# Patient Record
Sex: MALE | Race: WHITE | Employment: OTHER | URBAN - METROPOLITAN AREA
[De-identification: names, ages, dates, MRNs, and addresses within clinical notes are randomized per-mention and may not be internally consistent; named-entity substitution may affect disease eponyms.]

---

## 2017-09-14 ENCOUNTER — OFFICE VISIT (OUTPATIENT)
Dept: ORTHOPEDIC SURGERY | Age: 67
End: 2017-09-14

## 2017-09-14 ENCOUNTER — HOSPITAL ENCOUNTER (OUTPATIENT)
Dept: MRI IMAGING | Age: 67
Discharge: HOME | End: 2017-09-14
Attending: PHYSICIAN ASSISTANT | Admitting: PHYSICIAN ASSISTANT

## 2017-09-14 VITALS
DIASTOLIC BLOOD PRESSURE: 86 MMHG | HEART RATE: 66 BPM | RESPIRATION RATE: 16 BRPM | WEIGHT: 270 LBS | TEMPERATURE: 97.8 F | HEIGHT: 74 IN | SYSTOLIC BLOOD PRESSURE: 138 MMHG | BODY MASS INDEX: 34.65 KG/M2

## 2017-09-14 DIAGNOSIS — M54.17 LUMBOSACRAL RADICULOPATHY AT L5: ICD-10-CM

## 2017-09-14 DIAGNOSIS — M25.552 PAIN OF LEFT HIP JOINT: Primary | ICD-10-CM

## 2017-09-14 DIAGNOSIS — M16.12 ARTHRITIS OF LEFT HIP: ICD-10-CM

## 2017-09-14 DIAGNOSIS — M54.17 RADICULOPATHY OF LUMBOSACRAL REGION: ICD-10-CM

## 2017-09-14 PROCEDURE — 99203 OFFICE O/P NEW LOW 30 MIN: CPT | Performed by: PHYSICIAN ASSISTANT

## 2017-09-14 RX ORDER — DEXAMETHASONE 6 MG/1
6 TABLET ORAL 2 TIMES DAILY WITH MEALS
Qty: 20 TABLET | Refills: 0 | Status: SHIPPED | OUTPATIENT
Start: 2017-09-14 | End: 2017-09-24

## 2017-09-15 ENCOUNTER — OFFICE VISIT (OUTPATIENT)
Dept: ORTHOPEDIC SURGERY | Age: 67
End: 2017-09-15

## 2017-09-15 VITALS
HEIGHT: 74 IN | WEIGHT: 279 LBS | BODY MASS INDEX: 35.81 KG/M2 | HEART RATE: 66 BPM | TEMPERATURE: 97.4 F | DIASTOLIC BLOOD PRESSURE: 83 MMHG | SYSTOLIC BLOOD PRESSURE: 136 MMHG

## 2017-09-15 DIAGNOSIS — M21.372 FOOT DROP, LEFT: ICD-10-CM

## 2017-09-15 DIAGNOSIS — Z98.1 STATUS POST LUMBAR SPINAL FUSION: ICD-10-CM

## 2017-09-15 DIAGNOSIS — M96.1 POSTLAMINECTOMY SYNDROME: ICD-10-CM

## 2017-09-15 DIAGNOSIS — M48.061 SPINAL STENOSIS OF LUMBAR REGION: Primary | ICD-10-CM

## 2017-09-15 PROCEDURE — 99214 OFFICE O/P EST MOD 30 MIN: CPT | Performed by: ORTHOPAEDIC SURGERY

## 2017-09-22 ENCOUNTER — HOSPITAL ENCOUNTER (OUTPATIENT)
Dept: GENERAL RADIOLOGY | Age: 67
Discharge: HOME | End: 2017-09-22
Admitting: ORTHOPAEDIC SURGERY

## 2017-09-22 VITALS
TEMPERATURE: 97.7 F | RESPIRATION RATE: 16 BRPM | HEART RATE: 56 BPM | OXYGEN SATURATION: 94 % | SYSTOLIC BLOOD PRESSURE: 138 MMHG | DIASTOLIC BLOOD PRESSURE: 83 MMHG

## 2017-09-22 DIAGNOSIS — M96.1 POSTLAMINECTOMY SYNDROME: ICD-10-CM

## 2017-09-22 DIAGNOSIS — M48.061 SPINAL STENOSIS OF LUMBAR REGION: ICD-10-CM

## 2017-09-22 DIAGNOSIS — Z98.1 STATUS POST LUMBAR SPINAL FUSION: ICD-10-CM

## 2017-09-22 LAB
INR BLD: 0.91 (ref 0.85–1.15)
PLATELET # BLD: 240 K/UL (ref 135–450)
PROTHROMBIN TIME: 10.3 SEC (ref 9.6–13)

## 2017-09-22 ASSESSMENT — PAIN - FUNCTIONAL ASSESSMENT: PAIN_FUNCTIONAL_ASSESSMENT: 0-10

## 2017-09-25 ENCOUNTER — OFFICE VISIT (OUTPATIENT)
Dept: ORTHOPEDIC SURGERY | Age: 67
End: 2017-09-25

## 2017-09-25 VITALS
SYSTOLIC BLOOD PRESSURE: 144 MMHG | TEMPERATURE: 98 F | HEART RATE: 77 BPM | WEIGHT: 279 LBS | DIASTOLIC BLOOD PRESSURE: 93 MMHG | HEIGHT: 74 IN | BODY MASS INDEX: 35.81 KG/M2

## 2017-09-25 DIAGNOSIS — M21.372 FOOT DROP, LEFT: ICD-10-CM

## 2017-09-25 DIAGNOSIS — M48.061 SPINAL STENOSIS OF LUMBAR REGION: Primary | ICD-10-CM

## 2017-09-25 DIAGNOSIS — M96.1 POSTLAMINECTOMY SYNDROME: ICD-10-CM

## 2017-09-25 PROCEDURE — 99213 OFFICE O/P EST LOW 20 MIN: CPT | Performed by: ORTHOPAEDIC SURGERY

## 2017-09-25 RX ORDER — HYDROCODONE BITARTRATE AND ACETAMINOPHEN 5; 325 MG/1; MG/1
TABLET ORAL
Qty: 40 TABLET | Refills: 0 | Status: ON HOLD | OUTPATIENT
Start: 2017-09-25 | End: 2017-10-14 | Stop reason: HOSPADM

## 2017-09-26 ENCOUNTER — TELEPHONE (OUTPATIENT)
Dept: ORTHOPEDIC SURGERY | Age: 67
End: 2017-09-26

## 2017-10-05 ENCOUNTER — HOSPITAL ENCOUNTER (OUTPATIENT)
Dept: GENERAL RADIOLOGY | Age: 67
Discharge: HOME | End: 2017-10-31

## 2017-10-05 ENCOUNTER — HOSPITAL ENCOUNTER (OUTPATIENT)
Dept: PREADMISSION TESTING | Age: 67
Discharge: HOME | End: 2017-10-05
Attending: ORTHOPAEDIC SURGERY | Admitting: ORTHOPAEDIC SURGERY

## 2017-10-05 DIAGNOSIS — Z01.818 ENCOUNTER FOR PREADMISSION TESTING: ICD-10-CM

## 2017-10-05 LAB
A/G RATIO: 1.3 (ref 1.1–2.2)
ABO/RH: NORMAL
ALBUMIN SERPL-MCNC: 4 G/DL (ref 3.4–5)
ALP BLD-CCNC: 52 U/L (ref 40–129)
ALT SERPL-CCNC: 19 U/L (ref 10–40)
ANION GAP SERPL CALCULATED.3IONS-SCNC: 16 MMOL/L (ref 3–16)
ANTIBODY SCREEN: NORMAL
AST SERPL-CCNC: 13 U/L (ref 15–37)
BASOPHILS ABSOLUTE: 0 K/UL (ref 0–0.2)
BASOPHILS RELATIVE PERCENT: 0.4 %
BILIRUB SERPL-MCNC: 0.6 MG/DL (ref 0–1)
BILIRUBIN URINE: NEGATIVE
BLOOD, URINE: NEGATIVE
BUN BLDV-MCNC: 16 MG/DL (ref 7–20)
CALCIUM SERPL-MCNC: 9.9 MG/DL (ref 8.3–10.6)
CHLORIDE BLD-SCNC: 98 MMOL/L (ref 99–110)
CLARITY: CLEAR
CO2: 23 MMOL/L (ref 21–32)
COLOR: YELLOW
CREAT SERPL-MCNC: 0.8 MG/DL (ref 0.8–1.3)
EKG ATRIAL RATE: 82 BPM
EKG DIAGNOSIS: NORMAL
EKG P AXIS: -26 DEGREES
EKG P-R INTERVAL: 176 MS
EKG Q-T INTERVAL: 354 MS
EKG QRS DURATION: 96 MS
EKG QTC CALCULATION (BAZETT): 413 MS
EKG R AXIS: -47 DEGREES
EKG T AXIS: 11 DEGREES
EKG VENTRICULAR RATE: 82 BPM
EOSINOPHILS ABSOLUTE: 0.6 K/UL (ref 0–0.6)
EOSINOPHILS RELATIVE PERCENT: 6.2 %
GFR AFRICAN AMERICAN: >60
GFR NON-AFRICAN AMERICAN: >60
GLOBULIN: 3.2 G/DL
GLUCOSE BLD-MCNC: 160 MG/DL (ref 70–99)
GLUCOSE URINE: NEGATIVE MG/DL
HCT VFR BLD CALC: 45.8 % (ref 40.5–52.5)
HEMOGLOBIN: 15.8 G/DL (ref 13.5–17.5)
KETONES, URINE: NEGATIVE MG/DL
LEUKOCYTE ESTERASE, URINE: NEGATIVE
LYMPHOCYTES ABSOLUTE: 2.3 K/UL (ref 1–5.1)
LYMPHOCYTES RELATIVE PERCENT: 23.1 %
MCH RBC QN AUTO: 32.3 PG (ref 26–34)
MCHC RBC AUTO-ENTMCNC: 34.5 G/DL (ref 31–36)
MCV RBC AUTO: 93.7 FL (ref 80–100)
MICROSCOPIC EXAMINATION: NORMAL
MONOCYTES ABSOLUTE: 0.8 K/UL (ref 0–1.3)
MONOCYTES RELATIVE PERCENT: 8.4 %
NEUTROPHILS ABSOLUTE: 6.1 K/UL (ref 1.7–7.7)
NEUTROPHILS RELATIVE PERCENT: 61.9 %
NITRITE, URINE: NEGATIVE
PDW BLD-RTO: 13.8 % (ref 12.4–15.4)
PH UA: 5
PLATELET # BLD: 207 K/UL (ref 135–450)
PMV BLD AUTO: 7.9 FL (ref 5–10.5)
POTASSIUM SERPL-SCNC: 4.2 MMOL/L (ref 3.5–5.1)
PROTEIN UA: NEGATIVE MG/DL
RBC # BLD: 4.89 M/UL (ref 4.2–5.9)
SODIUM BLD-SCNC: 137 MMOL/L (ref 136–145)
SPECIFIC GRAVITY UA: 1.02
TOTAL PROTEIN: 7.2 G/DL (ref 6.4–8.2)
URINE REFLEX TO CULTURE: NORMAL
URINE TYPE: NORMAL
UROBILINOGEN, URINE: 0.2 E.U./DL
WBC # BLD: 9.9 K/UL (ref 4–11)

## 2017-10-10 ENCOUNTER — PAT TELEPHONE (OUTPATIENT)
Dept: PREADMISSION TESTING | Age: 67
End: 2017-10-10

## 2017-10-10 VITALS — WEIGHT: 279 LBS | BODY MASS INDEX: 35.81 KG/M2 | HEIGHT: 74 IN

## 2017-10-10 ASSESSMENT — PAIN DESCRIPTION - PAIN TYPE: TYPE: CHRONIC PAIN

## 2017-10-10 ASSESSMENT — PAIN DESCRIPTION - DESCRIPTORS: DESCRIPTORS: ACHING

## 2017-10-10 ASSESSMENT — PAIN - FUNCTIONAL ASSESSMENT: PAIN_FUNCTIONAL_ASSESSMENT: 0-10

## 2017-10-10 ASSESSMENT — PAIN DESCRIPTION - LOCATION: LOCATION: BACK

## 2017-10-10 ASSESSMENT — PAIN DESCRIPTION - FREQUENCY: FREQUENCY: INTERMITTENT

## 2017-10-10 ASSESSMENT — PAIN DESCRIPTION - ORIENTATION: ORIENTATION: LOWER

## 2017-10-10 ASSESSMENT — PAIN SCALES - GENERAL: PAINLEVEL_OUTOF10: 2

## 2017-10-11 DIAGNOSIS — M21.372 FOOT DROP, LEFT: ICD-10-CM

## 2017-10-11 DIAGNOSIS — Z98.1 STATUS POST LUMBAR SPINAL FUSION: Primary | ICD-10-CM

## 2017-10-11 RX ORDER — OXYCODONE AND ACETAMINOPHEN 10; 325 MG/1; MG/1
TABLET ORAL
Qty: 40 TABLET | Refills: 0 | Status: SHIPPED | OUTPATIENT
Start: 2017-10-11 | End: 2017-11-10

## 2017-10-11 RX ORDER — CIPROFLOXACIN 500 MG/1
500 TABLET, FILM COATED ORAL 2 TIMES DAILY
Qty: 20 TABLET | Refills: 0 | Status: SHIPPED | OUTPATIENT
Start: 2017-10-11 | End: 2017-10-21

## 2017-10-11 RX ORDER — CARISOPRODOL 350 MG/1
350 TABLET ORAL EVERY 6 HOURS PRN
Qty: 40 TABLET | Refills: 0 | Status: SHIPPED | OUTPATIENT
Start: 2017-10-11 | End: 2017-10-25

## 2017-10-13 ENCOUNTER — TELEPHONE (OUTPATIENT)
Dept: ORTHOPEDIC SURGERY | Age: 67
End: 2017-10-13

## 2017-10-13 NOTE — TELEPHONE ENCOUNTER
[facility] rx [phone] she is calling to get clarification on medication pls call to advise

## 2017-10-17 ENCOUNTER — TELEPHONE (OUTPATIENT)
Dept: ORTHOPEDIC SURGERY | Age: 67
End: 2017-10-17

## 2017-10-19 ENCOUNTER — TELEPHONE (OUTPATIENT)
Dept: ORTHOPEDIC SURGERY | Age: 67
End: 2017-10-19

## 2017-10-25 ENCOUNTER — OFFICE VISIT (OUTPATIENT)
Dept: ORTHOPEDIC SURGERY | Age: 67
End: 2017-10-25

## 2017-10-25 VITALS
DIASTOLIC BLOOD PRESSURE: 83 MMHG | HEART RATE: 98 BPM | WEIGHT: 279 LBS | HEIGHT: 74 IN | SYSTOLIC BLOOD PRESSURE: 124 MMHG | TEMPERATURE: 96.8 F | BODY MASS INDEX: 35.81 KG/M2

## 2017-10-25 DIAGNOSIS — Z98.1 STATUS POST LUMBAR SPINAL FUSION: Primary | ICD-10-CM

## 2017-10-25 PROCEDURE — 99024 POSTOP FOLLOW-UP VISIT: CPT | Performed by: ORTHOPAEDIC SURGERY

## 2017-10-25 NOTE — PROGRESS NOTES
kg)   BMI 35.82 kg/m²     Neurovascular status is intact to the upper and lower extremities. Incisions (if post-op): Well-healed    IMAGING: AP lateral lumbar spine films taken in the office today demonstrate excellent position of the construct    Radiographs: Results interpreted and reviewed with patient.       Nelda Veras MD FACS  Spinal Surgery  [facility]  10/25/2017

## 2017-12-22 ENCOUNTER — TELEPHONE (OUTPATIENT)
Dept: ORTHOPEDIC SURGERY | Age: 67
End: 2017-12-22
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
## 2018-01-24 ENCOUNTER — OFFICE VISIT (OUTPATIENT)
Dept: ORTHOPEDIC SURGERY | Age: 68
End: 2018-01-24

## 2018-01-24 VITALS
TEMPERATURE: 97.3 F | WEIGHT: 279.8 LBS | HEART RATE: 73 BPM | HEIGHT: 74 IN | DIASTOLIC BLOOD PRESSURE: 86 MMHG | BODY MASS INDEX: 35.91 KG/M2 | SYSTOLIC BLOOD PRESSURE: 126 MMHG

## 2018-01-24 DIAGNOSIS — Z98.1 STATUS POST LUMBAR SPINAL FUSION: Primary | ICD-10-CM

## 2018-01-24 PROCEDURE — 1036F TOBACCO NON-USER: CPT | Performed by: ORTHOPAEDIC SURGERY

## 2018-01-24 PROCEDURE — G8427 DOCREV CUR MEDS BY ELIG CLIN: HCPCS | Performed by: ORTHOPAEDIC SURGERY

## 2018-01-24 PROCEDURE — 4040F PNEUMOC VAC/ADMIN/RCVD: CPT | Performed by: ORTHOPAEDIC SURGERY

## 2018-01-24 PROCEDURE — G8417 CALC BMI ABV UP PARAM F/U: HCPCS | Performed by: ORTHOPAEDIC SURGERY

## 2018-01-24 PROCEDURE — 1123F ACP DISCUSS/DSCN MKR DOCD: CPT | Performed by: ORTHOPAEDIC SURGERY

## 2018-01-24 PROCEDURE — 99213 OFFICE O/P EST LOW 20 MIN: CPT | Performed by: ORTHOPAEDIC SURGERY

## 2018-01-24 PROCEDURE — 3017F COLORECTAL CA SCREEN DOC REV: CPT | Performed by: ORTHOPAEDIC SURGERY

## 2018-01-24 PROCEDURE — G8484 FLU IMMUNIZE NO ADMIN: HCPCS | Performed by: ORTHOPAEDIC SURGERY

## 2018-01-24 RX ORDER — CYCLOBENZAPRINE HCL 10 MG
10 TABLET ORAL 3 TIMES DAILY PRN
Qty: 90 TABLET | Refills: 0 | Status: SHIPPED | OUTPATIENT
Start: 2018-01-24 | End: 2018-02-23

## 2018-01-24 NOTE — PROGRESS NOTES
NAME: Carin Prieto  YOB: 1950  CSN: [id number]  DATE OF SERVICE: 1/24/2018    ASSESSMENT: Status post lumbar fusion. I feel he is making a good recovery. I feel he is suffering from lumbar deconditioning. PLAN: With restrictions and send to therapy for 10 visits for Gina exercise. I will give him Flexeril at nighttime. I will recheck him in 3 months with AP lateral lumbar spine films. DME: No orders of the defined types were placed in this encounter. The patient presents in follow-up from last encounter regarding his recent lumbar fusion 3 months ago. His only complaint is that night time he gets stiff and sore. He denies any radicular complaints. He still feels as if he has some weakness in his left ankle. He is taking no medication. The patient intake form was reviewed first for chief complaint of recent lumbar surgery, past medical history, and review of systems on 1/24/2018. ROS: Pertinent items are noted in HPI  Review of systems reviewed from Patient History Form dated on 9/14/17 and available in the patient's chart under the Media tab. Past Medical History:   Reviewed by Dr. Aramis Patino    Allergies   Allergen Reactions    Sulfa Antibiotics Rash       Past Medical History:   Diagnosis Date    HBP (high blood pressure)     Hyperlipidemia 11/10/2011    Insulin resistance     Lumbosacral radiculopathy at L5 9/14/2017    MDRO (multiple drug resistant organisms) resistance     tx successfully 2 yr ago    QI on CPAP        Current Outpatient Prescriptions   Medication Sig Dispense Refill    metFORMIN (GLUCOPHAGE) 500 MG tablet Take 1 tablet by mouth 2 times daily (with meals) 60 tablet 1    CRESTOR 40 MG tablet TAKE ONE TABLET BY MOUTH EVERY EVENING 90 tablet 2    fosinopril (MONOPRIL) 10 MG tablet TAKE ONE TABLET BY MOUTH DAILY 90 tablet 1    Thiamine HCl (VITAMIN B-1 PO) Take  by mouth daily. No current facility-administered medications for this visit.

## 2018-02-05 ENCOUNTER — HOSPITAL ENCOUNTER (OUTPATIENT)
Dept: OTHER | Age: 68
Discharge: HOME | End: 2018-02-28
Attending: ORTHOPAEDIC SURGERY | Admitting: ORTHOPAEDIC SURGERY

## 2018-02-05 ASSESSMENT — PAIN SCALES - GENERAL: PAINLEVEL_OUTOF10: 6

## 2018-02-05 ASSESSMENT — PAIN DESCRIPTION - LOCATION: LOCATION: BACK

## 2018-02-05 ASSESSMENT — PAIN DESCRIPTION - DESCRIPTORS: DESCRIPTORS: ACHING;DISCOMFORT

## 2018-02-05 ASSESSMENT — PAIN DESCRIPTION - FREQUENCY: FREQUENCY: INTERMITTENT

## 2018-02-05 ASSESSMENT — PAIN DESCRIPTION - ONSET: ONSET: AWAKENED FROM SLEEP

## 2018-02-05 ASSESSMENT — PAIN DESCRIPTION - PROGRESSION: CLINICAL_PROGRESSION: GRADUALLY IMPROVING

## 2018-02-05 ASSESSMENT — PAIN DESCRIPTION - ORIENTATION: ORIENTATION: MID

## 2018-02-05 ASSESSMENT — PAIN DESCRIPTION - PAIN TYPE: TYPE: SURGICAL PAIN

## 2018-02-05 NOTE — PROGRESS NOTES
Dorsiflexion 0-20: 0°  Spine  Lumbar: flexioncoming from thoracic spine, ext only to neutral, B SB dec by 60%, B rot dec  by 25%  Joint Mobility  Spine: fused in lumbar spine    Strength LLE  Strength LLE: Exception  L Ankle Dorsiflexion: 3+/5  Strength Other  Other: core 3/5     Additional Measures  Flexibility: severely tight hip flexors left greater than right  Assessment   Conditions Requiring Skilled Therapeutic Intervention  Body structures, Functions, Activity limitations: Decreased functional mobility ; Decreased ADL status; Decreased ROM; Decreased strength  Assessment: PLOF: was getting L drop foot prior to surgery  Patient has symptoms consistent with decreased flexibility and loss of strength from surgery and possible nerve compression  Treatment Diagnosis: pain, decreased strength, ROM and body mechanics and posture  Prognosis: Good  Decision Making: Low Complexity  History: Patient had Eric rods in his back for 40+ years. Several years ago one of them broke but he had no symptoms. Last year he started to develp pain and Left drop foot. He had rods removed and fusion in lumbar spine on 10/12/17. He has difficulty getting comfortale when he sleeps and he cannot stand up straight  Exam: see objective data  Clinical Presentation: stable  Patient Education: hep and proper posture  REQUIRES PT FOLLOW UP: Yes  Activity Tolerance  Activity Tolerance: Patient Tolerated treatment well         Plan   Plan  Times per week: 2  Plan weeks: 4  Current Treatment Recommendations: Strengthening, ROM, Balance Training, Manual Therapy - Soft Tissue Mobilization, Home Exercise Program, Positioning    G-Code  PT G-Codes  Functional Assessment Tool Used: Oswestry  Score: 22.5%  Functional Limitation: Changing and maintaining body position  Changing and Maintaining Body Position Current Status ():  At least 1 percent but less than 20 percent impaired, limited or restricted    OutComes Score  Oswestry CMS Modifier: PARKER  Oswestry Disability Scores %: 22.5  Goals  Short term goals  Time Frame for Short term goals: 2 weeks  Short term goal 1: Decrease pain to 3/10 at night.    Short term goal 2: Patient independent with home exercises  Short term goal 3: patient does not need cues to stand up straight  Long term goals  Time Frame for Long term goals : 4 weeks  Long term goal 1: Pain decreased to 1-2/10 at night  Long term goal 2: Patient able to walk 100 yards without foot slapping  Long term goal 3: patient has increased core strength to 4/5 for proper body mechanics when lifting 3year old granddaughter  Patient Goals   Patient goals : Dacia Gibson able to stand up straight and not slap my foot when walking\"       Therapy Time   Individual Concurrent Group Co-treatment   Time In  215         Time Out  605 [facility]

## 2018-02-05 NOTE — FLOWSHEET NOTE
Physical Therapy Daily Treatment Note  Date:  2018    Patient Name:  Tiera Lugo    :  1950  MRN: [id number]    Restrictions/Precautions: Restrictions/Precautions  Restrictions/Precautions: Fall Risk (none)    Pertinent Medical History:      Medical/Treatment Diagnosis Information:  · Diagnosis: s/p agustin removal and fusion  · Treatment Diagnosis: pain, decreased strength, ROM and body mechanics and posture    Insurance/Certification information:  PT Insurance Information: [facility] 100%  Physician Information:  Referring Practitioner: Dr. Ricketts Seeds of care signed (Y/N):  Sen for cosign    Visit# / total visits:    Pain level:  6/10     G-Code (if applicable):      Date / Visit # G-Code Applied:  /  PT G-Codes  Functional Assessment Tool Used: Oswestry  Score: 22.5%  Functional Limitation: Changing and maintaining body position  Changing and Maintaining Body Position Current Status (): At least 1 percent but less than 20 percent impaired, limited or restricted    Progress Note: []  Yes  [x]  No  Next due by: Visit #10      History of Injury: Patient had Eric rods in his back for 40+ years. Several years ago one of them broke but he had no symptoms. Last year he started to develp pain and Left drop foot. He had rods removed and fusion in lumbar spine on 10/12/17.   He has difficulty getting comfortale when he sleeps and he cannot stand up straight  Subjective:  Most pain at night  hard to stay asleep    Objective:  Observation: forward flexed posture, slapping of left foot  Test measurements:  See eval      Exercises:  Exercise/Equipment Resistance/Repetitions Other comments        Nustep #10 5 min L3    Slant board stretcch 1 min hep   Posterior pelvic tilts  x 10 hep   Supine hip flexor stretch 1 min R/L hep   Piriformis stretch 20 sec x 3 R/L hep   AROM dorsiflexion  2 x 10 R/L  hep   Transverse abdominus contraction X 10  hep                        STM Dennys  10 minutes  right

## 2018-02-14 ENCOUNTER — HOSPITAL ENCOUNTER (OUTPATIENT)
Dept: PHYSICAL THERAPY | Age: 68
Discharge: HOME | End: 2018-02-15
Admitting: ORTHOPAEDIC SURGERY

## 2018-02-14 NOTE — FLOWSHEET NOTE
hep   Transverse abdominus contraction X 10  hep   LYDIA X 1 min HEP                   STM   10 minutes  left sidelying with 2 pillows between legs   MHP X 15 min      Other Therapeutic Activities:    Patient was educated on diagnosis, plan of care and prognosis of their complaint. Also, frequency and duration of treatments was discussed. Patient was informed of the attendance policy and issued a copy for their records. Home Exercise Program:   Patient given home exercise program and demonstrates correct technique. HEP booklet also issued. Timed Code Treatment Minutes:  30    Total Treatment Minutes:  57    Treatment/Activity Tolerance:  [x] Patient tolerated treatment well [] Patient limited by fatigue  [] Patient limited by pain  [] Patient limited by other medical complications  [] Other:     Prognosis: [x] Good [] Fair  [] Poor    Goals:    Short term goals  Time Frame for Short term goals: 2 weeks  Short term goal 1: Decrease pain to 3/10 at night.    Short term goal 2: Patient independent with home exercises  Short term goal 3: patient does not need cues to stand up straight      Long term goals  Time Frame for Long term goals : 4 weeks  Long term goal 1: Pain decreased to 1-2/10 at night  Long term goal 2: Patient able to walk 100 yards without foot slapping  Long term goal 3: patient has increased core strength to 4/5 for proper body mechanics when lifting 3year old granddaughter       Patient Requires Follow-up: [x] Yes  [] No    Plan:   [] Continue per plan of care [] Alter current plan (see comments)  [x] Plan of care initiated [] Hold pending MD visit [] Discharge    Plan for Next Session:  Add manual hip flexor stretch and body mechanics instruction    Electronically signed by:  Uyen Molina PT, PT

## 2018-02-16 ENCOUNTER — HOSPITAL ENCOUNTER (OUTPATIENT)
Dept: PHYSICAL THERAPY | Age: 68
Discharge: HOME | End: 2018-02-17
Admitting: ORTHOPAEDIC SURGERY

## 2018-03-01 ENCOUNTER — HOSPITAL ENCOUNTER (OUTPATIENT)
Dept: OTHER | Age: 68
Discharge: HOME | End: 2018-03-27
Attending: ORTHOPAEDIC SURGERY | Admitting: ORTHOPAEDIC SURGERY

## 2018-04-25 ENCOUNTER — OFFICE VISIT (OUTPATIENT)
Dept: ORTHOPEDIC SURGERY | Age: 68
End: 2018-04-25

## 2018-04-25 VITALS
BODY MASS INDEX: 35.55 KG/M2 | TEMPERATURE: 98.2 F | HEIGHT: 74 IN | DIASTOLIC BLOOD PRESSURE: 86 MMHG | HEART RATE: 66 BPM | WEIGHT: 277 LBS | SYSTOLIC BLOOD PRESSURE: 139 MMHG

## 2018-04-25 DIAGNOSIS — Z98.1 STATUS POST LUMBAR SPINAL FUSION: Primary | ICD-10-CM

## 2018-04-25 PROCEDURE — 1123F ACP DISCUSS/DSCN MKR DOCD: CPT | Performed by: ORTHOPAEDIC SURGERY

## 2018-04-25 PROCEDURE — 3017F COLORECTAL CA SCREEN DOC REV: CPT | Performed by: ORTHOPAEDIC SURGERY

## 2018-04-25 PROCEDURE — G8427 DOCREV CUR MEDS BY ELIG CLIN: HCPCS | Performed by: ORTHOPAEDIC SURGERY

## 2018-04-25 PROCEDURE — 1036F TOBACCO NON-USER: CPT | Performed by: ORTHOPAEDIC SURGERY

## 2018-04-25 PROCEDURE — 4040F PNEUMOC VAC/ADMIN/RCVD: CPT | Performed by: ORTHOPAEDIC SURGERY

## 2018-04-25 PROCEDURE — G8417 CALC BMI ABV UP PARAM F/U: HCPCS | Performed by: ORTHOPAEDIC SURGERY

## 2018-04-25 PROCEDURE — 99213 OFFICE O/P EST LOW 20 MIN: CPT | Performed by: ORTHOPAEDIC SURGERY

## 2020-01-30 ENCOUNTER — OFFICE VISIT (OUTPATIENT)
Dept: ORTHOPEDIC SURGERY | Age: 70
End: 2020-01-30
Payer: MEDICARE

## 2020-01-30 VITALS
HEIGHT: 72 IN | HEART RATE: 62 BPM | DIASTOLIC BLOOD PRESSURE: 82 MMHG | SYSTOLIC BLOOD PRESSURE: 136 MMHG | BODY MASS INDEX: 36.98 KG/M2 | TEMPERATURE: 97.9 F | WEIGHT: 273 LBS

## 2020-01-30 PROCEDURE — G8484 FLU IMMUNIZE NO ADMIN: HCPCS | Performed by: PHYSICIAN ASSISTANT

## 2020-01-30 PROCEDURE — 20610 DRAIN/INJ JOINT/BURSA W/O US: CPT | Performed by: PHYSICIAN ASSISTANT

## 2020-01-30 PROCEDURE — 3017F COLORECTAL CA SCREEN DOC REV: CPT | Performed by: PHYSICIAN ASSISTANT

## 2020-01-30 PROCEDURE — 99213 OFFICE O/P EST LOW 20 MIN: CPT | Performed by: PHYSICIAN ASSISTANT

## 2020-01-30 PROCEDURE — 4040F PNEUMOC VAC/ADMIN/RCVD: CPT | Performed by: PHYSICIAN ASSISTANT

## 2020-01-30 PROCEDURE — G8427 DOCREV CUR MEDS BY ELIG CLIN: HCPCS | Performed by: PHYSICIAN ASSISTANT

## 2020-01-30 PROCEDURE — 1036F TOBACCO NON-USER: CPT | Performed by: PHYSICIAN ASSISTANT

## 2020-01-30 PROCEDURE — G8419 CALC BMI OUT NRM PARAM NOF/U: HCPCS | Performed by: PHYSICIAN ASSISTANT

## 2020-01-30 PROCEDURE — 1123F ACP DISCUSS/DSCN MKR DOCD: CPT | Performed by: PHYSICIAN ASSISTANT

## 2020-01-30 NOTE — PROGRESS NOTES
This dictation was done with [employer] dictation and may contain mechanical errors related to translation. The review of systems was currently provided by the patient and reviewed with the medical assistant at today's visit. Please see media. Subjective:  Lilian Phipps is a 78 y.o. who is here complaining of pain in his left shoulder and his left hip. Most recent the he was seen at [place] [id number] for his lumbar spinal fusion by Dr. Nabil López.  This was successful. To decrease some of his back pain but now he feels like he walks with a lean on the left side and the inability to move his left leg as well. He is also complaining of left shoulder pain and some restriction of motion. He states it is hard to do overhead activities and has had previous injuries to his shoulder which she feels has contributed to his overall function. He was sent for x-rays of his shoulder and his knee at the office today. Patient Active Problem List   Diagnosis    Elbow pain    Hyperlipidemia    HTN (hypertension)    Vitamin D deficiency    MTHFR mutation (HCC)    Insulin resistance    Lumbosacral radiculopathy at L5    Pain of left hip joint    Arthritis of left hip    Spinal stenosis of lumbar region    Postlaminectomy syndrome    Status post lumbar spinal fusion    10/12/17 Removal fixation T11-L3 ; laminectomy L3-4 L4-L5; posterior spinal fusion L2-L3, L3-L4            Current Outpatient Medications on File Prior to Visit   Medication Sig Dispense Refill    CRESTOR 40 MG tablet TAKE ONE TABLET BY MOUTH EVERY EVENING 90 tablet 2    fosinopril (MONOPRIL) 10 MG tablet TAKE ONE TABLET BY MOUTH DAILY 90 tablet 1    metFORMIN (GLUCOPHAGE) 500 MG tablet Take 1 tablet by mouth 2 times daily (with meals) (Patient not taking: Reported on 1/30/2020) 60 tablet 1    Thiamine HCl (VITAMIN B-1 PO) Take  by mouth daily. No current facility-administered medications on file prior to visit.           Objective:   Blood

## 2020-02-04 ENCOUNTER — OFFICE VISIT (OUTPATIENT)
Dept: ORTHOPEDIC SURGERY | Age: 70
End: 2020-02-04
Payer: MEDICARE

## 2020-02-04 VITALS
DIASTOLIC BLOOD PRESSURE: 72 MMHG | BODY MASS INDEX: 36.57 KG/M2 | HEIGHT: 72 IN | SYSTOLIC BLOOD PRESSURE: 122 MMHG | HEART RATE: 60 BPM | WEIGHT: 270 LBS

## 2020-02-04 PROCEDURE — 20611 DRAIN/INJ JOINT/BURSA W/US: CPT | Performed by: ORTHOPAEDIC SURGERY

## 2020-02-04 RX ORDER — LIDOCAINE HYDROCHLORIDE 10 MG/ML
5 INJECTION, SOLUTION INFILTRATION; PERINEURAL ONCE
Status: COMPLETED | OUTPATIENT
Start: 2020-02-04 | End: 2020-02-04

## 2020-02-04 RX ORDER — METHYLPREDNISOLONE ACETATE 40 MG/ML
80 INJECTION, SUSPENSION INTRA-ARTICULAR; INTRALESIONAL; INTRAMUSCULAR; SOFT TISSUE ONCE
Status: COMPLETED | OUTPATIENT
Start: 2020-02-04 | End: 2020-02-04

## 2020-02-04 RX ADMIN — LIDOCAINE HYDROCHLORIDE 5 ML: 10 INJECTION, SOLUTION INFILTRATION; PERINEURAL at 10:04

## 2020-02-04 RX ADMIN — METHYLPREDNISOLONE ACETATE 80 MG: 40 INJECTION, SUSPENSION INTRA-ARTICULAR; INTRALESIONAL; INTRAMUSCULAR; SOFT TISSUE at 10:04

## 2020-02-04 NOTE — PROGRESS NOTES
and the site was anesthetized with 3 mL of 1% Lidocaine. The site was then prepped with ChloraPrep. A sterile cover was placed over the transducer head and the femoral head neck junction once again visualized. A 20-gauge spinal needle was then introduced under ultrasound control to the head neck junction. Once the needle was intracapsular the hip joint was injected with 2 mL  of 1% Lidocaine mixed with 2 ml of 40 mg Depo Medrol. Stephenie Bernabe tolerated the procedure well and  did report ability to stand straighter because of less restriction of the left hip  within 5 minutes of the injection. 3.  I have recommended that he contact Mr. Guallpa to add physical therapy for stretching of his hips onto his treatment regimen. 4.  He is otherwise return to Dr. Mr. Dotson Pines care.       Ciera Soriano MD  2/4/2020

## 2020-03-02 ENCOUNTER — OFFICE VISIT (OUTPATIENT)
Dept: ORTHOPEDIC SURGERY | Age: 70
End: 2020-03-02
Payer: MEDICARE

## 2020-03-02 VITALS
HEIGHT: 72 IN | SYSTOLIC BLOOD PRESSURE: 130 MMHG | DIASTOLIC BLOOD PRESSURE: 86 MMHG | HEART RATE: 69 BPM | WEIGHT: 270 LBS | BODY MASS INDEX: 36.57 KG/M2 | TEMPERATURE: 97.9 F

## 2020-03-02 PROCEDURE — G8484 FLU IMMUNIZE NO ADMIN: HCPCS | Performed by: ORTHOPAEDIC SURGERY

## 2020-03-02 PROCEDURE — G8417 CALC BMI ABV UP PARAM F/U: HCPCS | Performed by: ORTHOPAEDIC SURGERY

## 2020-03-02 PROCEDURE — 3017F COLORECTAL CA SCREEN DOC REV: CPT | Performed by: ORTHOPAEDIC SURGERY

## 2020-03-02 PROCEDURE — 4040F PNEUMOC VAC/ADMIN/RCVD: CPT | Performed by: ORTHOPAEDIC SURGERY

## 2020-03-02 PROCEDURE — 1123F ACP DISCUSS/DSCN MKR DOCD: CPT | Performed by: ORTHOPAEDIC SURGERY

## 2020-03-02 PROCEDURE — 99214 OFFICE O/P EST MOD 30 MIN: CPT | Performed by: ORTHOPAEDIC SURGERY

## 2020-03-02 PROCEDURE — G8427 DOCREV CUR MEDS BY ELIG CLIN: HCPCS | Performed by: ORTHOPAEDIC SURGERY

## 2020-03-02 PROCEDURE — 1036F TOBACCO NON-USER: CPT | Performed by: ORTHOPAEDIC SURGERY

## 2020-03-02 PROCEDURE — 20610 DRAIN/INJ JOINT/BURSA W/O US: CPT | Performed by: ORTHOPAEDIC SURGERY

## 2020-03-02 NOTE — PROGRESS NOTES
This dictation was done with [employer] dictation and may contain mechanical errors related to translation. Blood pressure 130/86, pulse 69, temperature 97.9 °F (36.6 °C), height 6' (1.829 m), weight 270 lb (122.5 kg).       Kenalog:  NDC: [id number]  Lot Number: [id number]  Expiration Date: 4/21

## 2020-03-08 NOTE — PROGRESS NOTES
class II morbid obesity BMI of 36.62. Examination of his back shows mild decreased range of motion but no specific pain tenderness or instability. Motor exam to both right and left lower extremity shows quadriceps hamstrings hip abductors and abductors foot plantar dorsiflexors are intact 4-5 over 5. Sensation and perfusion are intact in both right and left lower extremity. Deep tendon reflexes are 0 at the knee and 0 at the ankle of the right lower and left lower extremity. There is no numbness or tingling noted in the right lower or left lower extremity. No other obvious cutaneous lesions of edema or cellulitic processes are noted in either right or left lower extremity. Examination of the right knee shows significant tenderness to palpation medially patellofemoral crepitus with range of motion and obvious effusion.  -7 degrees of full extension to 120 degrees of flexion noted with no obvious signs of instability or deep sepsis of the right knee. Examination of the left knee shows -5 degrees of full extension to 120 degrees of flexion. No signs of instability or deep sepsis are noted in the left knee. X-rays obtained AP lateral and patellofemoral view of the right and left knee shows bilateral varus degenerative osteoarthritis. Bone-on-bone deformities are seen in both right and left knee with sclerosis and near complete loss of the medial tibiofemoral joint space. Advanced patellofemoral degenerative disease is seen on the right knee. Overall both knees have a degenerative appearance with loss of lateral tibiofemoral cartilage and osteophyte formation also with tibial subluxation noted. No other obvious fractures tumors or dislocations are noted on these x-rays. X-rays in the past of his hips shows bilateral degenerative osteoarthritis of the hip joint radiographically more severe on the left than on the right.     Impression 53-year-old male with multiple lower extremity degenerative processes noted.  This gentleman will eventually need to get to either hip and/or knee arthroplasty to control his symptoms. He appears to be relatively healthy and I believe that now is the time to consider this significantly. The patient is not interested in any surgical approach at this time and wants to try intra-articular cortisone injections. We also discussed with him at length total knee replacement. Both right and left knee are injected with 3 cc of Marcaine and 40 mg of Kenalog. We had a 35 minute face-to-face discussion of which greater than 50% of the time was spent in counseling with this patient concerning total knee arthroplasty it's preoperative evaluation and its postoperative pain management and follow-up. We went over the surgical procedure risks and complications including bleeding, infection,  neurovascular injury, decreased range of motion, persistent pain, instability, DVT, pulmonary embolism, leg length inequality, change in mental status, and need for further surgical procedures. The patient understands this and we have answered all of their questions and concerns. We will see how he responds to the cortisone injections and follow-up in 4 to 6 weeks or PRN.

## 2020-06-11 ENCOUNTER — PREP FOR PROCEDURE (OUTPATIENT)
Dept: ORTHOPEDIC SURGERY | Age: 70
End: 2020-06-11

## 2020-06-11 ENCOUNTER — OFFICE VISIT (OUTPATIENT)
Dept: ORTHOPEDIC SURGERY | Age: 70
End: 2020-06-11
Payer: MEDICARE

## 2020-06-11 VITALS — TEMPERATURE: 97.3 F | HEIGHT: 72 IN | WEIGHT: 270 LBS | BODY MASS INDEX: 36.57 KG/M2

## 2020-06-11 PROCEDURE — 3017F COLORECTAL CA SCREEN DOC REV: CPT | Performed by: PHYSICIAN ASSISTANT

## 2020-06-11 PROCEDURE — 4040F PNEUMOC VAC/ADMIN/RCVD: CPT | Performed by: PHYSICIAN ASSISTANT

## 2020-06-11 PROCEDURE — G8417 CALC BMI ABV UP PARAM F/U: HCPCS | Performed by: PHYSICIAN ASSISTANT

## 2020-06-11 PROCEDURE — 1123F ACP DISCUSS/DSCN MKR DOCD: CPT | Performed by: PHYSICIAN ASSISTANT

## 2020-06-11 PROCEDURE — 1036F TOBACCO NON-USER: CPT | Performed by: PHYSICIAN ASSISTANT

## 2020-06-11 PROCEDURE — 20610 DRAIN/INJ JOINT/BURSA W/O US: CPT | Performed by: PHYSICIAN ASSISTANT

## 2020-06-11 PROCEDURE — 99213 OFFICE O/P EST LOW 20 MIN: CPT | Performed by: PHYSICIAN ASSISTANT

## 2020-06-11 PROCEDURE — G8427 DOCREV CUR MEDS BY ELIG CLIN: HCPCS | Performed by: PHYSICIAN ASSISTANT

## 2020-06-11 NOTE — PROGRESS NOTES
Patient Instructions by Pedro Luis Morales MD at 05/09/17 11:18 AM     Author:  Pedro Luis Morales MD Service:  (none) Author Type:  Physician     Filed:  05/09/17 11:20 AM Encounter Date:  5/9/2017 Status:  Addendum     :  Pedro Luis Morales MD (Physician)            PLAN:    Discontinue erythromycin ointment and Clindamycin tablet  Use Patanol both eyes 2x/day as needed. Continue convergence eye exercise for 5 minutes daily. Additional Educational Resources: For additional resources regarding your symptoms, diagnosis, or further health information, please visit the Health Resources section on [email or web address] or the Online Health Resources section in [employer].         Revision History        User Key Date/Time User Provider Type Action    > [N/A] 05/09/17 11:20 AM Pedro Luis Morales MD Physician Addend     [N/A] 05/09/17 11:19 AM Pedro Luis Morales MD Physician Sign This dictation was done with [employer] dictation and may contain mechanical errors related to translation. The review of systems was currently provided by the patient and reviewed with the medical assistant at today's visit. Please see media. Subjective:  Humberto Mcdaniels is a 78 y.o. who is here for discussion and treatment of his left shoulder and his right knee and his left hip. He had a cortisone shot for his knee on 3/2/2020 which worked well for several weeks. He also had a left shoulder injection on 1/30/2020 which is worked up until the last few weeks. Now both his knee and his shoulder do have swelling some restriction of motion. Overall he is aggravated most by his left hip and is ready to schedule left hip replacement for this fall. The pain and disability from his left leg includes problems sleeping going up and down steps or prolonged sitting. Patient Active Problem List   Diagnosis    Elbow pain    Hyperlipidemia    HTN (hypertension)    Vitamin D deficiency    MTHFR mutation (HCC)    Insulin resistance    Lumbosacral radiculopathy at L5    Pain of left hip joint    Arthritis of left hip    Spinal stenosis of lumbar region    Postlaminectomy syndrome    Status post lumbar spinal fusion    10/12/17 Removal fixation T11-L3 ; laminectomy L3-4 L4-L5; posterior spinal fusion L2-L3, L3-L4            Current Outpatient Medications on File Prior to Visit   Medication Sig Dispense Refill    metFORMIN (GLUCOPHAGE) 500 MG tablet Take 1 tablet by mouth 2 times daily (with meals) 60 tablet 1    CRESTOR 40 MG tablet TAKE ONE TABLET BY MOUTH EVERY EVENING 90 tablet 2    fosinopril (MONOPRIL) 10 MG tablet TAKE ONE TABLET BY MOUTH DAILY 90 tablet 1    Thiamine HCl (VITAMIN B-1 PO) Take  by mouth daily. No current facility-administered medications on file prior to visit.           Objective:   Temperature 97.3 °F (36.3 °C), temperature source Temporal, height 6' (1.829 m), weight 270 lb

## 2020-06-24 ENCOUNTER — HOSPITAL ENCOUNTER (OUTPATIENT)
Dept: PHYSICAL THERAPY | Age: 70
Setting detail: THERAPIES SERIES
Discharge: HOME | End: 2020-06-24
Payer: MEDICARE

## 2020-06-24 PROCEDURE — 97161 PT EVAL LOW COMPLEX 20 MIN: CPT

## 2020-06-24 PROCEDURE — 97110 THERAPEUTIC EXERCISES: CPT

## 2020-06-24 PROCEDURE — 97530 THERAPEUTIC ACTIVITIES: CPT

## 2020-06-24 ASSESSMENT — PAIN DESCRIPTION - DESCRIPTORS: DESCRIPTORS: ACHING

## 2020-06-24 ASSESSMENT — PAIN DESCRIPTION - FREQUENCY: FREQUENCY: INTERMITTENT

## 2020-06-24 ASSESSMENT — PAIN DESCRIPTION - PROGRESSION: CLINICAL_PROGRESSION: GRADUALLY WORSENING

## 2020-06-24 ASSESSMENT — PAIN DESCRIPTION - PAIN TYPE: TYPE: CHRONIC PAIN

## 2020-06-24 ASSESSMENT — PAIN - FUNCTIONAL ASSESSMENT: PAIN_FUNCTIONAL_ASSESSMENT: PREVENTS OR INTERFERES SOME ACTIVE ACTIVITIES AND ADLS

## 2020-06-24 ASSESSMENT — PAIN DESCRIPTION - ORIENTATION: ORIENTATION: RIGHT;LEFT

## 2020-06-24 ASSESSMENT — PAIN SCALES - GENERAL: PAINLEVEL_OUTOF10: 6

## 2020-06-24 ASSESSMENT — PAIN DESCRIPTION - ONSET: ONSET: GRADUAL

## 2020-06-24 ASSESSMENT — PAIN DESCRIPTION - LOCATION: LOCATION: HIP;KNEE;SHOULDER

## 2020-06-24 NOTE — PROGRESS NOTES
Physical Therapy  Initial and DC notes. Chong Baumgarten TOTAL JOINT - PREHAB ASSESSMENT FORM    Date:  2020    Patient Name:  Angela Aldridge    :  1950  [facility]:[id number]    Restrictions/Precautions: Restrictions/Precautions  Restrictions/Precautions: Fall Risk(Min risk of falls)    Pertinent Medical History: Additional Pertinent Hx: PLOF: Independent    Medical/Treatment Diagnosis Information:  · Diagnosis: Prehab of left THR, left hip arthritis  · Treatment Diagnosis: Pain on left hip, both knees and left shoulder, antalgic gait pattern, abnormal posture    Insurance/Certification information:  PT Insurance Information: [employer]  Physician Information:  Referring Practitioner: Brandie Zamora [facility]:    [x] Total Hip Replacement [] Right  [x] Left  DOS: 2020  [] Not yet scheduled  [] Total Knee Replacement [] Right  [] Left    [x] Prehab Eval  [] Prehab D/C  [] Post - OP Visit             Weeks from sx [] Post-op D/C    SUBJECTIVE:    Chart Reviewed: Yes  Patient assessed for rehabilitation services?: Yes  Referral Date : 20  Onset Date: 18  Subjective  Subjective: Pt has history of lumbar surgery in 2017, bilateral knee pain , and left hip pain, for which he will have surgery in . He had cortisone injections into left hip, left shoulder and both knees. All were helpful except for  injection into left hip. His goal is to learn exercises to get stronger before surgery. He is retired but does boating and cares for granddaughter. .  Pain Screening  Patient Currently in Pain: Yes    DME ASSESSMENT:   Current available equipment:    [] Std. Wilma Conley       [x] Rolling walker      [] 4 wheeled walker     [] Isadore Rede     [x] Straight cane     [] Crutches   [] Reacher            [] Sock Aid              [] Shower chair            [] Leg          [] Long handle shoe horn   [] Other:     Equipment needed at discharge from hospital:   [] Std.  Wilma Conley       [] Rolling walker [] 4 wheeled walker     [] Song Daniels     [] Straight cane     [] Crutches   [x] Reacher            [x] Sock Aid              [] Shower chair            [x] Leg          [x] Long handle shoe horn   [] Other:     SOCIAL ASSESSMENT:  1. Will you live with someone who can care for you after surgery? [x] Yes  [] No  2. Where is the bathroom located in your post-surgery place of residence? [] 1st Floor [x] 2nd Floor  3. Where is the bedroom located in your post-surgery place of residence? [] 1st Floor [x] 2nd Floor  4. Do you use community supports (home help, meals-on-wheels, district nurse)? [x] None or 1 per week  [] 2 or more per week  5. How many stairs will you have to climb to get in to your place of residence? [x] Less than 5  [] More than 5  6. Have you had a fall in the past year? [x] Yes  [] No     Notes:  2 falls                                                                                Available PT Visits:      BMI:    Height    Weight        FUNCTIONAL ASSESSMENT:   1. Do you use ambulatory aids? [] None [x] Single Point Cane  [] Crutches/Walker/Wheelchair  2. How far on average can you walk? [] 2 Blocks or more  [x] 1-2 Blocks  [] House bound most of the time  3. What is your physical activity level? [] Highly Active [x] Active  [] Somewhat active  [] Sedentary     Knee AROM (degrees) R L   Flexion (in supine) 120 115   Extension (use \"-\" to denote deficit) (long sitting, resting) 0 0     MMT (lbs) R L   Knee Extension (peak in seated) 40 40   Hip Abduction (peak in side lying) 45 45     Functional Testing (shoes on) Results   Timed Up and Go (TUG)                  (rounded seconds) 11   30 Second Sit-Stand Test                  (completed repetitions) 12   6 Minute Walk Test  (meters) 358.8     Standing Balance  (shoes on, rounded to the nearest seconds, 10 second max)     1. Stand with your feet side by side:   Time: N/T        (sec)  2.  Place the instep of one foot so it Is touching the big toe of the surgical  Time:      Foot (surgical foot in back)     (sec)     3. Place surgical foot behind so the toes  Time: N/T      Are touching the heal of the other foot.   (sec)    4.  Stand on surgical foot   Time: N/T          (sec)       EXPECTED DISCHARGE DISPOSITION:                      [] Home no PT  [x] Home w/ OP PT                                              If Discharge Disposition is to a facility, please indicate reason  [] Home w/ [facility]                               [] Lack of home support                   [] Medical Comorbidities  [] SNF/Inpatient Rehab                                          [] Transportation                               [] Other          ASSESSMENT:     Conditions Requiring Skilled Therapeutic Intervention  Body structures, Functions, Activity limitations: Decreased functional mobility , Decreased ADL status, Increased pain  Assessment: PLOF: Independent  Treatment Diagnosis: Pain on left hip, both knees and left shoulder, antalgic gait pattern, abnormal posture  Prognosis: Good  Decision Making: Low Complexity  REQUIRES PT FOLLOW UP: No  Decision Making: Decision Making: Low Complexity    Goals:    Short term goals  Time Frame for Short term goals: 1  Short term goal 1: Pt will be independent in exercises for HEP  Short term goal 2: Pt will show safety and independence with use of walker and cane on level surfaces and stairs            Plan:  Plan  Times per week: 1  Plan weeks: 1  Current Treatment Recommendations: Strengthening, ROM, Gait Training, Home Exercise Program    Therapy Time   Individual   Time In 1110    Time Out 1215   Minutes 65   Timed Code Treatment Minutes: 30 Minutes    Signature:  Antonina Walker, PT

## 2020-06-24 NOTE — FLOWSHEET NOTE
Treatment Minutes: 60     [x] EVAL (LOW) 10728   [] EVAL (MOD) 50390   [] EVAL (HIGH) 14108   [] RE-EVAL   [x] TE (93004) x   1    [] NMR (49853)   x    [] Manual (31605) x    [] Ultrasound (46182) x  [x] TA (03243) x1 [] Mech Traction (89147)  [] Ionto (81987)           [] ES (un) (69882):   [] Other:      Treatment/Activity Tolerance:  [x] Patient tolerated treatment well [] Patient limited by fatigue  [] Patient limited by pain  [] Patient limited by other medical complications  [] Other:     Prognosis: [x] Good [] Fair  [] Poor    Goals:    Short term goals  Time Frame for Short term goals: 1  Short term goal 1: Pt will be independent in exercises for HEP  Short term goal 2: Pt will show safety and independence with use of walker and cane on level surfaces and stairs              Patient Requires Follow-up: [x] Yes  [] No    Plan:   [] Continue per plan of care [] Alter current plan (see comments)  [x] Plan of care initiated [] Hold pending MD visit [] Discharge    Plan for Next Session: Initial and DC,, as pt is active and has other commitments and will be committed to exercises independently.   Electronically signed by:  Lc Pizarro, PT,

## 2020-09-03 ENCOUNTER — TELEPHONE (OUTPATIENT)
Dept: ORTHOPEDIC SURGERY | Age: 70
End: 2020-09-03

## 2020-09-03 NOTE — TELEPHONE ENCOUNTER
HE WANTED TO SEE IF HE COULD GET A LILLI INJECTION WITHOUT IT INTERFERING IN HIS HIP SX.  HE CAN BE REACHED [phone]

## 2020-09-08 ENCOUNTER — OFFICE VISIT (OUTPATIENT)
Dept: ORTHOPEDIC SURGERY | Age: 70
End: 2020-09-08
Payer: MEDICARE

## 2020-09-08 VITALS — TEMPERATURE: 97.4 F | WEIGHT: 270 LBS | HEIGHT: 72 IN | BODY MASS INDEX: 36.57 KG/M2

## 2020-09-08 PROCEDURE — 99213 OFFICE O/P EST LOW 20 MIN: CPT | Performed by: PHYSICIAN ASSISTANT

## 2020-09-08 PROCEDURE — G8417 CALC BMI ABV UP PARAM F/U: HCPCS | Performed by: PHYSICIAN ASSISTANT

## 2020-09-08 PROCEDURE — 20610 DRAIN/INJ JOINT/BURSA W/O US: CPT | Performed by: PHYSICIAN ASSISTANT

## 2020-09-08 PROCEDURE — G8427 DOCREV CUR MEDS BY ELIG CLIN: HCPCS | Performed by: PHYSICIAN ASSISTANT

## 2020-09-08 PROCEDURE — 3017F COLORECTAL CA SCREEN DOC REV: CPT | Performed by: PHYSICIAN ASSISTANT

## 2020-09-08 PROCEDURE — 4040F PNEUMOC VAC/ADMIN/RCVD: CPT | Performed by: PHYSICIAN ASSISTANT

## 2020-09-08 PROCEDURE — 1123F ACP DISCUSS/DSCN MKR DOCD: CPT | Performed by: PHYSICIAN ASSISTANT

## 2020-09-08 PROCEDURE — 1036F TOBACCO NON-USER: CPT | Performed by: PHYSICIAN ASSISTANT

## 2020-09-10 PROBLEM — M17.11 PRIMARY OSTEOARTHRITIS OF RIGHT KNEE: Status: ACTIVE | Noted: 2020-09-10

## 2020-09-10 PROBLEM — M19.012 ARTHRITIS OF LEFT SHOULDER REGION: Status: ACTIVE | Noted: 2020-09-10

## 2020-09-10 NOTE — PROGRESS NOTES
Subjective:      Patient ID: Liliana Vera is a 78 y.o. male. Chief Complaint   Patient presents with    Follow-up     R knee and L shoulder/ last cortisone inj. 6.11.20        HPI:   He is here for follow up on left shoulder and right knee pain. He states symptoms had improved with the previous cortisone injections. His pain has returned over the past few weeks. Pain is moderate. Pain is worse with activities such as reaching over head or movements of the shoulder and weightbearing activities with the knee. Pain improves with cortisone injections, home exercise program and activity modification. Review of Systems:   A 14 point review of systems and history form completed by the patient has been reviewed. This form is scanned in the media tab of the patient's chart under 1/30/2020 date. Past Medical History:   Diagnosis Date    HBP (high blood pressure)     Hyperlipidemia 11/10/2011    Insulin resistance     Lumbosacral radiculopathy at L5 9/14/2017    MDRO (multiple drug resistant organisms) resistance     tx successfully 2 yr ago    QI on CPAP     Primary osteoarthritis of right knee 9/10/2020       Family History   Problem Relation Age of Onset    Hypertension Brother        Past Surgical History:   Procedure Laterality Date    ANKLE SURGERY  30 years ago    left    BACK SURGERY  30 years ago    BACK SURGERY  10/12/2017    removal of agustin hardware T11- L3,4, spinal fusion L2-L5    KNEE SURGERY  5 years ago    right       Social History     Occupational History    Occupation: [employer]   Tobacco Use    Smoking status: Never Smoker    Smokeless tobacco: Never Used   Substance and Sexual Activity    Alcohol use: Yes     Comment: occ.     Drug use: No    Sexual activity: Not on file       Current Outpatient Medications   Medication Sig Dispense Refill    metFORMIN (GLUCOPHAGE) 500 MG tablet Take 1 tablet by mouth 2 times daily (with meals) 60 tablet 1    CRESTOR 40 MG tablet TAKE ONE TABLET BY MOUTH EVERY EVENING 90 tablet 2    fosinopril (MONOPRIL) 10 MG tablet TAKE ONE TABLET BY MOUTH DAILY 90 tablet 1    Thiamine HCl (VITAMIN B-1 PO) Take  by mouth daily. No current facility-administered medications for this visit. Objective:   He  is alert, oriented x 3, pleasant, well nourished, developed and in no acute distress. Temp 97.4 °F (36.3 °C) (Temporal)   Ht 6' (1.829 m)   Wt 270 lb (122.5 kg)   BMI 36.62 kg/m²        Examination of the left shoulder shows: There is no deformity or erythema. Deltoid region- there is mild to moderate tenderness to palpation. AC Joint is non tender to palpation. Clavicle is non tender to palpation. There is  weakness with supraspinatus testing. There is  pain with supraspinatus testing. Apprehension Test negative. Supraspinatus Test positive. Shoulder ROM- There is decreased AROM. Examination of the right knee: There is not erythema. There moderate pain associated with ROM testing. Medial joint line is tender to palpation. Lateral joint line is tender to palpation. Patellar Compression testing produces discomfort. Extensor Mechanism is  intact. X Rays: not performed in the office today:     Assessment:       ICD-10-CM    1. Arthritis of left shoulder region  M19.012 [place] ARTHROCENTESIS ASPIR&/INJ MAJOR JT/BURSA W/O US     [place] TRIAMCINOLONE ACETONIDE INJ   2. Primary osteoarthritis of right knee  M17.11 [place] ARTHROCENTESIS ASPIR&/INJ MAJOR JT/BURSA W/O US     [place] TRIAMCINOLONE ACETONIDE INJ        Plan:       The natural history of the patient's diagnosis as well as the surgical and non surgical treatment options were discussed in full and questions were answered. Risks and benefits of the treatment options also reviewed in detail. At this time, not interested in surgical option or is not a surgical candidate. Risk and benefits of repeat corticosteroid intra-articular injection was discussed today.   All questions were answered to his satisfaction. He verbally consented to proceed with intra-articular injection today. Cortisone Injection                                                       PROCEDURE NOTE:     Pre op Diagnosis:  bilateral knee pain     Post op Diagnosis: Same  With the patient's permission, his right knee was prepped  in standard sterile fashion with  Alcohol and 2 cc of 0.25% Marcaine and 1 cc of Kenalog 40 mg was injected into the bilateral lateral compartment  without difficulty. The patient tolerated this well without difficulty. A band-aid was applied. The patient was advised to ice the knee for 15-20 minutes to relieve any injection site related pain. Cortisone  Injection  PROCEDURE NOTE:  Pre op Diagnosis: Shoulder pain  Post op Diagnosis: Same  With the patient's permission, his left shoulder was prepped  in standard sterile fashion with  Alcohol and 2 cc of 0.25% Marcaine and 1 cc of Kenalog 40 mg was injected into the left lateral subacromial space  without difficulty. The patient tolerated this well without difficulty. A band-aid was applied. The patient was advised to ice the shoulder for 15-20 minutes to relieve any injection site related pain. Use ice on the affected joint and decrease activity level for 48 hours post injection. If diabetic, monitor blood sugars as there may be a temporary elevation in blood glucose levels related to steroid injection. A home exercise program was instructed today including ROM exercises and strengthening exercises. The patient verbalized understanding of these exercises as well as the importance of the exercise program to promote return of normal function. If pain intensifies or other problems arise you are to notify the office. Follow Up: as needed. Call or return to clinic prn if these symptoms worsen or fail to improve as anticipated.

## 2020-09-17 ENCOUNTER — TELEPHONE (OUTPATIENT)
Dept: ORTHOPEDICS UNIT | Age: 70
End: 2020-09-17

## 2020-10-13 ENCOUNTER — TELEPHONE (OUTPATIENT)
Dept: ORTHOPEDIC SURGERY | Age: 70
End: 2020-10-13

## 2020-10-13 NOTE — TELEPHONE ENCOUNTER
CPT: 52346  AUTHORIZATION: NPR  BODY PART: left hip  DATE RANGE:   COMMENTS:     Per Availity, NPR for outpatient with observation

## 2020-10-19 ENCOUNTER — HOSPITAL ENCOUNTER (OUTPATIENT)
Dept: PREADMISSION TESTING | Age: 70
Discharge: HOME | End: 2020-10-23
Payer: MEDICARE

## 2020-10-19 NOTE — PROGRESS NOTES
Patient attended [facility] class on 10/19/20. Patient verified surgery for Total hip replacement and received patient information and educational JET folder including education handouts on hand hygiene and preventing constipation. Interviews completed by PT, OT, and PAT. Labs and Tests not completed as ordered/necessary, patient stated he would have his preop lab work done at [facility] which was confirmed for 10/29/20, informed pelon han MA at [facility] office of this, patient refused to have preop lab work completed at [facility]. Patient given handout instructions on Pre-operative Showering techniques and the use of anti-septic 3 days before surgery. Anti-septic bottle given to patient to take home. Patient states no further questions or concerns. DOS: 11/3/20  Dr Mcfarland Prost: home with wife dre and [facility] ;if applicable. Per Patient Will see/Saw PCP on 10/5/20.

## 2020-10-21 ENCOUNTER — PREP FOR PROCEDURE (OUTPATIENT)
Dept: ORTHOPEDICS UNIT | Age: 70
End: 2020-10-21

## 2020-10-22 RX ORDER — OXYCODONE HYDROCHLORIDE 10 MG/1
10 TABLET ORAL ONCE
Status: CANCELLED | OUTPATIENT
Start: 2020-10-22 | End: 2020-10-22

## 2020-10-28 ENCOUNTER — OFFICE VISIT (OUTPATIENT)
Dept: PRIMARY CARE CLINIC | Age: 70
End: 2020-10-28
Payer: MEDICARE

## 2020-10-28 PROCEDURE — 99211 OFF/OP EST MAY X REQ PHY/QHP: CPT | Performed by: NURSE PRACTITIONER

## 2020-10-29 ENCOUNTER — TELEPHONE (OUTPATIENT)
Dept: ORTHOPEDIC SURGERY | Age: 70
End: 2020-10-29

## 2020-10-29 ENCOUNTER — HOSPITAL ENCOUNTER (OUTPATIENT)
Age: 70
Discharge: HOME | End: 2020-10-29
Payer: MEDICARE

## 2020-10-29 ENCOUNTER — OFFICE VISIT (OUTPATIENT)
Dept: ORTHOPEDIC SURGERY | Age: 70
End: 2020-10-29

## 2020-10-29 LAB
ABO/RH: NORMAL
ALBUMIN SERPL-MCNC: 4.5 G/DL (ref 3.4–5)
ANION GAP SERPL CALCULATED.3IONS-SCNC: 12 MMOL/L (ref 3–16)
ANTIBODY SCREEN: NORMAL
APTT: 28.1 SEC (ref 24.2–36.2)
BASOPHILS ABSOLUTE: 0 K/UL (ref 0–0.2)
BASOPHILS RELATIVE PERCENT: 0.6 %
BILIRUBIN URINE: NEGATIVE
BLOOD, URINE: NEGATIVE
BUN BLDV-MCNC: 16 MG/DL (ref 7–20)
CALCIUM SERPL-MCNC: 10.3 MG/DL (ref 8.3–10.6)
CHLORIDE BLD-SCNC: 104 MMOL/L (ref 99–110)
CLARITY: CLEAR
CO2: 25 MMOL/L (ref 21–32)
COLOR: YELLOW
CREAT SERPL-MCNC: 1 MG/DL (ref 0.8–1.3)
EKG ATRIAL RATE: 68 BPM
EKG DIAGNOSIS: NORMAL
EKG P AXIS: 82 DEGREES
EKG P-R INTERVAL: 186 MS
EKG Q-T INTERVAL: 364 MS
EKG QRS DURATION: 106 MS
EKG QTC CALCULATION (BAZETT): 387 MS
EKG R AXIS: 109 DEGREES
EKG T AXIS: 45 DEGREES
EKG VENTRICULAR RATE: 68 BPM
EOSINOPHILS ABSOLUTE: 0.4 K/UL (ref 0–0.6)
EOSINOPHILS RELATIVE PERCENT: 4.4 %
ESTIMATED AVERAGE GLUCOSE: 151.3 MG/DL
GFR AFRICAN AMERICAN: >60
GFR NON-AFRICAN AMERICAN: >60
GLUCOSE BLD-MCNC: 138 MG/DL (ref 70–99)
GLUCOSE URINE: NEGATIVE MG/DL
HBA1C MFR BLD: 6.9 %
HCT VFR BLD CALC: 45.9 % (ref 40.5–52.5)
HEMOGLOBIN: 15.7 G/DL (ref 13.5–17.5)
INR BLD: 0.96 (ref 0.86–1.14)
KETONES, URINE: NEGATIVE MG/DL
LEUKOCYTE ESTERASE, URINE: NEGATIVE
LYMPHOCYTES ABSOLUTE: 1.9 K/UL (ref 1–5.1)
LYMPHOCYTES RELATIVE PERCENT: 21.5 %
MCH RBC QN AUTO: 31.9 PG (ref 26–34)
MCHC RBC AUTO-ENTMCNC: 34.2 G/DL (ref 31–36)
MCV RBC AUTO: 93.2 FL (ref 80–100)
MICROSCOPIC EXAMINATION: NORMAL
MONOCYTES ABSOLUTE: 0.8 K/UL (ref 0–1.3)
MONOCYTES RELATIVE PERCENT: 9.6 %
NEUTROPHILS ABSOLUTE: 5.5 K/UL (ref 1.7–7.7)
NEUTROPHILS RELATIVE PERCENT: 63.9 %
NITRITE, URINE: NEGATIVE
PDW BLD-RTO: 13.5 % (ref 12.4–15.4)
PH UA: 5 (ref 5–8)
PLATELET # BLD: 222 K/UL (ref 135–450)
PMV BLD AUTO: 7.9 FL (ref 5–10.5)
POTASSIUM SERPL-SCNC: 4.5 MMOL/L (ref 3.5–5.1)
PREALBUMIN: 28.8 MG/DL (ref 20–40)
PROTEIN UA: NEGATIVE MG/DL
PROTHROMBIN TIME: 11.1 SEC (ref 10–13.2)
RBC # BLD: 4.93 M/UL (ref 4.2–5.9)
SARS-COV-2, PCR: NOT DETECTED
SEDIMENTATION RATE, ERYTHROCYTE: 7 MM/HR (ref 0–20)
SODIUM BLD-SCNC: 141 MMOL/L (ref 136–145)
SPECIFIC GRAVITY UA: 1.02 (ref 1–1.03)
URINE REFLEX TO CULTURE: NORMAL
URINE TYPE: NORMAL
UROBILINOGEN, URINE: 0.2 E.U./DL
VITAMIN D 25-HYDROXY: 58.2 NG/ML
WBC # BLD: 8.6 K/UL (ref 4–11)

## 2020-10-29 PROCEDURE — 99024 POSTOP FOLLOW-UP VISIT: CPT | Performed by: ORTHOPAEDIC SURGERY

## 2020-10-29 PROCEDURE — 82040 ASSAY OF SERUM ALBUMIN: CPT

## 2020-10-29 PROCEDURE — 85610 PROTHROMBIN TIME: CPT

## 2020-10-29 PROCEDURE — 83036 HEMOGLOBIN GLYCOSYLATED A1C: CPT

## 2020-10-29 PROCEDURE — 85730 THROMBOPLASTIN TIME PARTIAL: CPT

## 2020-10-29 PROCEDURE — 80048 BASIC METABOLIC PNL TOTAL CA: CPT

## 2020-10-29 PROCEDURE — 93010 ELECTROCARDIOGRAM REPORT: CPT | Performed by: INTERNAL MEDICINE

## 2020-10-29 PROCEDURE — 86901 BLOOD TYPING SEROLOGIC RH(D): CPT

## 2020-10-29 PROCEDURE — 36415 COLL VENOUS BLD VENIPUNCTURE: CPT

## 2020-10-29 PROCEDURE — 82306 VITAMIN D 25 HYDROXY: CPT

## 2020-10-29 PROCEDURE — 93005 ELECTROCARDIOGRAM TRACING: CPT

## 2020-10-29 PROCEDURE — 84134 ASSAY OF PREALBUMIN: CPT

## 2020-10-29 PROCEDURE — 85025 COMPLETE CBC W/AUTO DIFF WBC: CPT

## 2020-10-29 PROCEDURE — 86900 BLOOD TYPING SEROLOGIC ABO: CPT

## 2020-10-29 PROCEDURE — 87641 MR-STAPH DNA AMP PROBE: CPT

## 2020-10-29 PROCEDURE — 85652 RBC SED RATE AUTOMATED: CPT

## 2020-10-29 PROCEDURE — 86850 RBC ANTIBODY SCREEN: CPT

## 2020-10-29 PROCEDURE — 81003 URINALYSIS AUTO W/O SCOPE: CPT

## 2020-10-29 NOTE — RESULT ENCOUNTER NOTE

## 2020-10-30 LAB — MRSA SCREEN RT-PCR: NORMAL

## 2020-10-30 NOTE — PROGRESS NOTES
X-rays obtained today AP pelvis AP lateral of the left hip shows advanced degenerative osteoarthritis of the left hip joint. Sclerosis and narrowing of the acetabular femoral space with osteophyte formation noted in the acetabulum. Deformation of the femoral head with degenerative changes are also noted. Contralateral right hip arthritis is seen but not at the same degree of the left hip. No other obvious fractures tumors or dislocations are seen on these x-rays.

## 2020-11-02 ENCOUNTER — ANESTHESIA EVENT (OUTPATIENT)
Dept: OPERATING ROOM | Age: 70
End: 2020-11-02
Payer: MEDICARE

## 2020-11-02 RX ORDER — OXYCODONE HYDROCHLORIDE 10 MG/1
10 TABLET ORAL ONCE
Status: CANCELLED | OUTPATIENT
Start: 2020-11-03 | End: 2020-11-02

## 2020-11-03 ENCOUNTER — APPOINTMENT (OUTPATIENT)
Dept: GENERAL RADIOLOGY | Age: 70
End: 2020-11-03
Attending: ORTHOPAEDIC SURGERY
Payer: MEDICARE

## 2020-11-03 ENCOUNTER — ANESTHESIA (OUTPATIENT)
Dept: OPERATING ROOM | Age: 70
End: 2020-11-03
Payer: MEDICARE

## 2020-11-03 ENCOUNTER — HOSPITAL ENCOUNTER (OUTPATIENT)
Age: 70
Discharge: HOME | End: 2020-11-04
Attending: ORTHOPAEDIC SURGERY | Admitting: ORTHOPAEDIC SURGERY
Payer: MEDICARE

## 2020-11-03 VITALS
RESPIRATION RATE: 22 BRPM | SYSTOLIC BLOOD PRESSURE: 89 MMHG | DIASTOLIC BLOOD PRESSURE: 56 MMHG | OXYGEN SATURATION: 97 % | TEMPERATURE: 95.7 F

## 2020-11-03 LAB
ABO/RH: NORMAL
ANTIBODY SCREEN: NORMAL
GLUCOSE BLD-MCNC: 138 MG/DL (ref 70–99)
GLUCOSE BLD-MCNC: 140 MG/DL (ref 70–99)
GLUCOSE BLD-MCNC: 271 MG/DL (ref 70–99)
PERFORMED ON: ABNORMAL

## 2020-11-03 PROCEDURE — C1776 JOINT DEVICE (IMPLANTABLE): HCPCS | Performed by: ORTHOPAEDIC SURGERY

## 2020-11-03 PROCEDURE — 2580000003 HC RX 258: Performed by: NURSE ANESTHETIST, CERTIFIED REGISTERED

## 2020-11-03 PROCEDURE — 3600000005 HC SURGERY LEVEL 5 BASE: Performed by: ORTHOPAEDIC SURGERY

## 2020-11-03 PROCEDURE — 3600000015 HC SURGERY LEVEL 5 ADDTL 15MIN: Performed by: ORTHOPAEDIC SURGERY

## 2020-11-03 PROCEDURE — 97162 PT EVAL MOD COMPLEX 30 MIN: CPT

## 2020-11-03 PROCEDURE — 2580000003 HC RX 258: Performed by: ANESTHESIOLOGY

## 2020-11-03 PROCEDURE — 86850 RBC ANTIBODY SCREEN: CPT

## 2020-11-03 PROCEDURE — 3700000000 HC ANESTHESIA ATTENDED CARE: Performed by: ORTHOPAEDIC SURGERY

## 2020-11-03 PROCEDURE — 88311 DECALCIFY TISSUE: CPT

## 2020-11-03 PROCEDURE — 2500000003 HC RX 250 WO HCPCS: Performed by: NURSE ANESTHETIST, CERTIFIED REGISTERED

## 2020-11-03 PROCEDURE — 6370000000 HC RX 637 (ALT 250 FOR IP): Performed by: ORTHOPAEDIC SURGERY

## 2020-11-03 PROCEDURE — 88304 TISSUE EXAM BY PATHOLOGIST: CPT

## 2020-11-03 PROCEDURE — 86900 BLOOD TYPING SEROLOGIC ABO: CPT

## 2020-11-03 PROCEDURE — 6360000002 HC RX W HCPCS: Performed by: NURSE ANESTHETIST, CERTIFIED REGISTERED

## 2020-11-03 PROCEDURE — 7100000001 HC PACU RECOVERY - ADDTL 15 MIN: Performed by: ORTHOPAEDIC SURGERY

## 2020-11-03 PROCEDURE — 97116 GAIT TRAINING THERAPY: CPT

## 2020-11-03 PROCEDURE — 3209999900 FLUORO FOR SURGICAL PROCEDURES

## 2020-11-03 PROCEDURE — 6360000002 HC RX W HCPCS: Performed by: ORTHOPAEDIC SURGERY

## 2020-11-03 PROCEDURE — 94150 VITAL CAPACITY TEST: CPT

## 2020-11-03 PROCEDURE — 97166 OT EVAL MOD COMPLEX 45 MIN: CPT

## 2020-11-03 PROCEDURE — 86901 BLOOD TYPING SEROLOGIC RH(D): CPT

## 2020-11-03 PROCEDURE — 73502 X-RAY EXAM HIP UNI 2-3 VIEWS: CPT

## 2020-11-03 PROCEDURE — 6360000002 HC RX W HCPCS: Performed by: ANESTHESIOLOGY

## 2020-11-03 PROCEDURE — 2720000010 HC SURG SUPPLY STERILE: Performed by: ORTHOPAEDIC SURGERY

## 2020-11-03 PROCEDURE — 7100000000 HC PACU RECOVERY - FIRST 15 MIN: Performed by: ORTHOPAEDIC SURGERY

## 2020-11-03 PROCEDURE — 2580000003 HC RX 258: Performed by: ORTHOPAEDIC SURGERY

## 2020-11-03 PROCEDURE — 3700000001 HC ADD 15 MINUTES (ANESTHESIA): Performed by: ORTHOPAEDIC SURGERY

## 2020-11-03 PROCEDURE — 97535 SELF CARE MNGMENT TRAINING: CPT

## 2020-11-03 PROCEDURE — 2500000003 HC RX 250 WO HCPCS: Performed by: ORTHOPAEDIC SURGERY

## 2020-11-03 PROCEDURE — 73501 X-RAY EXAM HIP UNI 1 VIEW: CPT

## 2020-11-03 PROCEDURE — 2709999900 HC NON-CHARGEABLE SUPPLY: Performed by: ORTHOPAEDIC SURGERY

## 2020-11-03 DEVICE — CUP ACET DIA56MM HIP GRIPTION PRI CEMENTLESS FIX SECT SER: Type: IMPLANTABLE DEVICE | Site: HIP | Status: FUNCTIONAL

## 2020-11-03 DEVICE — HEAD FEM DIA40MM +1.5MM OFFSET 12/14 TAPR ARTC EZ HIP MTL: Type: IMPLANTABLE DEVICE | Site: HIP | Status: FUNCTIONAL

## 2020-11-03 DEVICE — STEM FEM SZ 7 HIP HI OFFSET CLLRD CEMENTLESS 12/14 TAPR: Type: IMPLANTABLE DEVICE | Site: HIP | Status: FUNCTIONAL

## 2020-11-03 DEVICE — LINER ACET OD56MM ID40MM +4MM OFFSET HIP POLYETH MTL ON: Type: IMPLANTABLE DEVICE | Site: HIP | Status: FUNCTIONAL

## 2020-11-03 RX ORDER — SODIUM CHLORIDE 450 MG/100ML
INJECTION, SOLUTION INTRAVENOUS CONTINUOUS
Status: DISCONTINUED | OUTPATIENT
Start: 2020-11-03 | End: 2020-11-04

## 2020-11-03 RX ORDER — OXYCODONE HYDROCHLORIDE 5 MG/1
5 TABLET ORAL EVERY 4 HOURS PRN
Status: DISCONTINUED | OUTPATIENT
Start: 2020-11-03 | End: 2020-11-04 | Stop reason: HOSPADM

## 2020-11-03 RX ORDER — FENTANYL CITRATE 50 UG/ML
25 INJECTION, SOLUTION INTRAMUSCULAR; INTRAVENOUS EVERY 5 MIN PRN
Status: DISCONTINUED | OUTPATIENT
Start: 2020-11-03 | End: 2020-11-03 | Stop reason: HOSPADM

## 2020-11-03 RX ORDER — SODIUM CHLORIDE 0.9 % (FLUSH) 0.9 %
10 SYRINGE (ML) INJECTION EVERY 12 HOURS SCHEDULED
Status: DISCONTINUED | OUTPATIENT
Start: 2020-11-03 | End: 2020-11-03 | Stop reason: HOSPADM

## 2020-11-03 RX ORDER — NICOTINE POLACRILEX 4 MG
15 LOZENGE BUCCAL PRN
Status: DISCONTINUED | OUTPATIENT
Start: 2020-11-03 | End: 2020-11-04 | Stop reason: HOSPADM

## 2020-11-03 RX ORDER — ROSUVASTATIN CALCIUM 40 MG/1
40 TABLET, COATED ORAL NIGHTLY
Status: DISCONTINUED | OUTPATIENT
Start: 2020-11-03 | End: 2020-11-04 | Stop reason: HOSPADM

## 2020-11-03 RX ORDER — OXYCODONE HYDROCHLORIDE 5 MG/1
5-10 TABLET ORAL EVERY 6 HOURS PRN
Qty: 40 TABLET | Refills: 0 | Status: SHIPPED | OUTPATIENT
Start: 2020-11-03 | End: 2020-11-08

## 2020-11-03 RX ORDER — SENNA AND DOCUSATE SODIUM 50; 8.6 MG/1; MG/1
1 TABLET, FILM COATED ORAL 2 TIMES DAILY
Status: DISCONTINUED | OUTPATIENT
Start: 2020-11-03 | End: 2020-11-04 | Stop reason: HOSPADM

## 2020-11-03 RX ORDER — OXYCODONE HYDROCHLORIDE AND ACETAMINOPHEN 5; 325 MG/1; MG/1
2 TABLET ORAL PRN
Status: DISCONTINUED | OUTPATIENT
Start: 2020-11-03 | End: 2020-11-03 | Stop reason: HOSPADM

## 2020-11-03 RX ORDER — OXYCODONE HYDROCHLORIDE 10 MG/1
10 TABLET ORAL ONCE
Status: COMPLETED | OUTPATIENT
Start: 2020-11-03 | End: 2020-11-03

## 2020-11-03 RX ORDER — MORPHINE SULFATE 2 MG/ML
2 INJECTION, SOLUTION INTRAMUSCULAR; INTRAVENOUS
Status: DISCONTINUED | OUTPATIENT
Start: 2020-11-03 | End: 2020-11-04 | Stop reason: HOSPADM

## 2020-11-03 RX ORDER — ONDANSETRON 2 MG/ML
4 INJECTION INTRAMUSCULAR; INTRAVENOUS
Status: DISCONTINUED | OUTPATIENT
Start: 2020-11-03 | End: 2020-11-03 | Stop reason: HOSPADM

## 2020-11-03 RX ORDER — FOSINOPRIL SODIUM 10 MG/1
1 TABLET ORAL DAILY
Status: DISCONTINUED | OUTPATIENT
Start: 2020-11-03 | End: 2020-11-03

## 2020-11-03 RX ORDER — ONDANSETRON 2 MG/ML
4 INJECTION INTRAMUSCULAR; INTRAVENOUS EVERY 6 HOURS PRN
Status: DISCONTINUED | OUTPATIENT
Start: 2020-11-03 | End: 2020-11-04 | Stop reason: HOSPADM

## 2020-11-03 RX ORDER — INSULIN GLARGINE 100 [IU]/ML
0.25 INJECTION, SOLUTION SUBCUTANEOUS NIGHTLY
Status: DISCONTINUED | OUTPATIENT
Start: 2020-11-03 | End: 2020-11-03

## 2020-11-03 RX ORDER — LIDOCAINE HYDROCHLORIDE 20 MG/ML
INJECTION, SOLUTION EPIDURAL; INFILTRATION; INTRACAUDAL; PERINEURAL PRN
Status: DISCONTINUED | OUTPATIENT
Start: 2020-11-03 | End: 2020-11-03 | Stop reason: SDUPTHER

## 2020-11-03 RX ORDER — ACETAMINOPHEN 160 MG
TABLET,DISINTEGRATING ORAL DAILY
COMMUNITY

## 2020-11-03 RX ORDER — ROCURONIUM BROMIDE 10 MG/ML
INJECTION, SOLUTION INTRAVENOUS PRN
Status: DISCONTINUED | OUTPATIENT
Start: 2020-11-03 | End: 2020-11-03 | Stop reason: SDUPTHER

## 2020-11-03 RX ORDER — OXYCODONE HYDROCHLORIDE AND ACETAMINOPHEN 5; 325 MG/1; MG/1
1 TABLET ORAL PRN
Status: DISCONTINUED | OUTPATIENT
Start: 2020-11-03 | End: 2020-11-03 | Stop reason: HOSPADM

## 2020-11-03 RX ORDER — SODIUM CHLORIDE 0.9 % (FLUSH) 0.9 %
10 SYRINGE (ML) INJECTION PRN
Status: DISCONTINUED | OUTPATIENT
Start: 2020-11-03 | End: 2020-11-04 | Stop reason: HOSPADM

## 2020-11-03 RX ORDER — MIDAZOLAM HYDROCHLORIDE 1 MG/ML
INJECTION INTRAMUSCULAR; INTRAVENOUS PRN
Status: DISCONTINUED | OUTPATIENT
Start: 2020-11-03 | End: 2020-11-03 | Stop reason: SDUPTHER

## 2020-11-03 RX ORDER — DEXTROSE MONOHYDRATE 25 G/50ML
12.5 INJECTION, SOLUTION INTRAVENOUS PRN
Status: DISCONTINUED | OUTPATIENT
Start: 2020-11-03 | End: 2020-11-04 | Stop reason: HOSPADM

## 2020-11-03 RX ORDER — DEXAMETHASONE SODIUM PHOSPHATE 4 MG/ML
INJECTION, SOLUTION INTRA-ARTICULAR; INTRALESIONAL; INTRAMUSCULAR; INTRAVENOUS; SOFT TISSUE PRN
Status: DISCONTINUED | OUTPATIENT
Start: 2020-11-03 | End: 2020-11-03 | Stop reason: SDUPTHER

## 2020-11-03 RX ORDER — GLYCOPYRROLATE 0.2 MG/ML
INJECTION INTRAMUSCULAR; INTRAVENOUS PRN
Status: DISCONTINUED | OUTPATIENT
Start: 2020-11-03 | End: 2020-11-03 | Stop reason: SDUPTHER

## 2020-11-03 RX ORDER — DEXTROSE MONOHYDRATE 50 MG/ML
100 INJECTION, SOLUTION INTRAVENOUS PRN
Status: DISCONTINUED | OUTPATIENT
Start: 2020-11-03 | End: 2020-11-04 | Stop reason: HOSPADM

## 2020-11-03 RX ORDER — TRANEXAMIC ACID 100 MG/ML
INJECTION, SOLUTION INTRAVENOUS
Status: COMPLETED | OUTPATIENT
Start: 2020-11-03 | End: 2020-11-03

## 2020-11-03 RX ORDER — FENTANYL CITRATE 50 UG/ML
INJECTION, SOLUTION INTRAMUSCULAR; INTRAVENOUS PRN
Status: DISCONTINUED | OUTPATIENT
Start: 2020-11-03 | End: 2020-11-03 | Stop reason: SDUPTHER

## 2020-11-03 RX ORDER — PROPOFOL 10 MG/ML
INJECTION, EMULSION INTRAVENOUS PRN
Status: DISCONTINUED | OUTPATIENT
Start: 2020-11-03 | End: 2020-11-03 | Stop reason: SDUPTHER

## 2020-11-03 RX ORDER — ACETAMINOPHEN 325 MG/1
650 TABLET ORAL EVERY 4 HOURS PRN
Status: DISCONTINUED | OUTPATIENT
Start: 2020-11-03 | End: 2020-11-04 | Stop reason: HOSPADM

## 2020-11-03 RX ORDER — LISINOPRIL 10 MG/1
10 TABLET ORAL EVERY EVENING
Status: DISCONTINUED | OUTPATIENT
Start: 2020-11-03 | End: 2020-11-04 | Stop reason: HOSPADM

## 2020-11-03 RX ORDER — OXYCODONE HYDROCHLORIDE 10 MG/1
10 TABLET ORAL EVERY 4 HOURS PRN
Status: DISCONTINUED | OUTPATIENT
Start: 2020-11-03 | End: 2020-11-04 | Stop reason: HOSPADM

## 2020-11-03 RX ORDER — ONDANSETRON 2 MG/ML
INJECTION INTRAMUSCULAR; INTRAVENOUS PRN
Status: DISCONTINUED | OUTPATIENT
Start: 2020-11-03 | End: 2020-11-03 | Stop reason: SDUPTHER

## 2020-11-03 RX ORDER — SODIUM CHLORIDE 9 MG/ML
INJECTION, SOLUTION INTRAVENOUS CONTINUOUS
Status: DISCONTINUED | OUTPATIENT
Start: 2020-11-03 | End: 2020-11-03

## 2020-11-03 RX ORDER — SODIUM CHLORIDE 0.9 % (FLUSH) 0.9 %
10 SYRINGE (ML) INJECTION PRN
Status: DISCONTINUED | OUTPATIENT
Start: 2020-11-03 | End: 2020-11-03 | Stop reason: HOSPADM

## 2020-11-03 RX ORDER — ASPIRIN 81 MG/1
81 TABLET ORAL 2 TIMES DAILY
Qty: 60 TABLET | Refills: 0 | Status: ON HOLD | OUTPATIENT
Start: 2020-11-03 | End: 2021-11-17 | Stop reason: HOSPADM

## 2020-11-03 RX ORDER — SODIUM CHLORIDE 0.9 % (FLUSH) 0.9 %
10 SYRINGE (ML) INJECTION EVERY 12 HOURS SCHEDULED
Status: DISCONTINUED | OUTPATIENT
Start: 2020-11-03 | End: 2020-11-04 | Stop reason: HOSPADM

## 2020-11-03 RX ORDER — SUCCINYLCHOLINE/SOD CL,ISO/PF 200MG/10ML
SYRINGE (ML) INTRAVENOUS PRN
Status: DISCONTINUED | OUTPATIENT
Start: 2020-11-03 | End: 2020-11-03 | Stop reason: SDUPTHER

## 2020-11-03 RX ADMIN — PHENYLEPHRINE HYDROCHLORIDE 50 MCG: 10 INJECTION INTRAVENOUS at 10:31

## 2020-11-03 RX ADMIN — FENTANYL CITRATE 25 MCG: 50 INJECTION, SOLUTION INTRAMUSCULAR; INTRAVENOUS at 12:45

## 2020-11-03 RX ADMIN — CEFAZOLIN SODIUM 2 G: 10 INJECTION, POWDER, FOR SOLUTION INTRAVENOUS at 17:30

## 2020-11-03 RX ADMIN — FENTANYL CITRATE 50 MCG: 50 INJECTION INTRAMUSCULAR; INTRAVENOUS at 10:22

## 2020-11-03 RX ADMIN — MIDAZOLAM 2 MG: 1 INJECTION INTRAMUSCULAR; INTRAVENOUS at 09:58

## 2020-11-03 RX ADMIN — DOCUSATE SODIUM 50 MG AND SENNOSIDES 8.6 MG 1 TABLET: 8.6; 5 TABLET, FILM COATED ORAL at 21:35

## 2020-11-03 RX ADMIN — CEFAZOLIN 2 G: 10 INJECTION, POWDER, FOR SOLUTION INTRAVENOUS at 09:58

## 2020-11-03 RX ADMIN — ONDANSETRON 4 MG: 2 INJECTION INTRAMUSCULAR; INTRAVENOUS at 10:10

## 2020-11-03 RX ADMIN — PHENYLEPHRINE HYDROCHLORIDE 100 MCG: 10 INJECTION INTRAVENOUS at 11:24

## 2020-11-03 RX ADMIN — LIDOCAINE HYDROCHLORIDE 80 MG: 20 INJECTION, SOLUTION EPIDURAL; INFILTRATION; INTRACAUDAL; PERINEURAL at 10:01

## 2020-11-03 RX ADMIN — INSULIN LISPRO 2 UNITS: 100 INJECTION, SOLUTION INTRAVENOUS; SUBCUTANEOUS at 21:43

## 2020-11-03 RX ADMIN — FENTANYL CITRATE 50 MCG: 50 INJECTION INTRAMUSCULAR; INTRAVENOUS at 10:01

## 2020-11-03 RX ADMIN — PHENYLEPHRINE HYDROCHLORIDE 50 MCG: 10 INJECTION INTRAVENOUS at 10:23

## 2020-11-03 RX ADMIN — ROCURONIUM BROMIDE 5 MG: 10 INJECTION INTRAVENOUS at 10:01

## 2020-11-03 RX ADMIN — ROCURONIUM BROMIDE 45 MG: 10 INJECTION INTRAVENOUS at 10:10

## 2020-11-03 RX ADMIN — FENTANYL CITRATE 25 MCG: 50 INJECTION, SOLUTION INTRAMUSCULAR; INTRAVENOUS at 12:51

## 2020-11-03 RX ADMIN — PROPOFOL 150 MG: 10 INJECTION, EMULSION INTRAVENOUS at 10:01

## 2020-11-03 RX ADMIN — GLYCOPYRROLATE 0.1 MG: 0.2 INJECTION, SOLUTION INTRAMUSCULAR; INTRAVENOUS at 09:58

## 2020-11-03 RX ADMIN — ROSUVASTATIN CALCIUM 40 MG: 40 TABLET, FILM COATED ORAL at 21:36

## 2020-11-03 RX ADMIN — SUGAMMADEX 200 MG: 100 INJECTION, SOLUTION INTRAVENOUS at 11:58

## 2020-11-03 RX ADMIN — OXYCODONE HYDROCHLORIDE 10 MG: 10 TABLET ORAL at 21:35

## 2020-11-03 RX ADMIN — SODIUM CHLORIDE: 4.5 INJECTION, SOLUTION INTRAVENOUS at 15:36

## 2020-11-03 RX ADMIN — OXYCODONE HYDROCHLORIDE 10 MG: 10 TABLET ORAL at 15:36

## 2020-11-03 RX ADMIN — Medication 100 MG: at 10:01

## 2020-11-03 RX ADMIN — HYDROMORPHONE HYDROCHLORIDE 0.5 MG: 1 INJECTION, SOLUTION INTRAMUSCULAR; INTRAVENOUS; SUBCUTANEOUS at 12:31

## 2020-11-03 RX ADMIN — OXYCODONE HYDROCHLORIDE 10 MG: 10 TABLET ORAL at 09:13

## 2020-11-03 RX ADMIN — SODIUM CHLORIDE: 9 INJECTION, SOLUTION INTRAVENOUS at 09:00

## 2020-11-03 RX ADMIN — PHENYLEPHRINE HYDROCHLORIDE 50 MCG: 10 INJECTION INTRAVENOUS at 10:41

## 2020-11-03 RX ADMIN — SODIUM CHLORIDE: 9 INJECTION, SOLUTION INTRAVENOUS at 11:58

## 2020-11-03 RX ADMIN — ROCURONIUM BROMIDE 20 MG: 10 INJECTION INTRAVENOUS at 10:18

## 2020-11-03 RX ADMIN — METFORMIN HYDROCHLORIDE 500 MG: 500 TABLET ORAL at 17:31

## 2020-11-03 RX ADMIN — DEXAMETHASONE SODIUM PHOSPHATE 4 MG: 4 INJECTION, SOLUTION INTRAMUSCULAR; INTRAVENOUS at 10:10

## 2020-11-03 RX ADMIN — LISINOPRIL 10 MG: 10 TABLET ORAL at 17:31

## 2020-11-03 ASSESSMENT — PULMONARY FUNCTION TESTS
PIF_VALUE: 15
PIF_VALUE: 17
PIF_VALUE: 17
PIF_VALUE: 14
PIF_VALUE: 13
PIF_VALUE: 17
PIF_VALUE: 24
PIF_VALUE: 14
PIF_VALUE: 14
PIF_VALUE: 17
PIF_VALUE: 17
PIF_VALUE: 15
PIF_VALUE: 15
PIF_VALUE: 1
PIF_VALUE: 13
PIF_VALUE: 15
PIF_VALUE: 14
PIF_VALUE: 15
PIF_VALUE: 17
PIF_VALUE: 14
PIF_VALUE: 15
PIF_VALUE: 14
PIF_VALUE: 17
PIF_VALUE: 13
PIF_VALUE: 14
PIF_VALUE: 15
PIF_VALUE: 16
PIF_VALUE: 14
PIF_VALUE: 3
PIF_VALUE: 14
PIF_VALUE: 17
PIF_VALUE: 15
PIF_VALUE: 16
PIF_VALUE: 15
PIF_VALUE: 15
PIF_VALUE: 17
PIF_VALUE: 15
PIF_VALUE: 14
PIF_VALUE: 14
PIF_VALUE: 15
PIF_VALUE: 15
PIF_VALUE: 14
PIF_VALUE: 15
PIF_VALUE: 4
PIF_VALUE: 14
PIF_VALUE: 15
PIF_VALUE: 17
PIF_VALUE: 15
PIF_VALUE: 13
PIF_VALUE: 14
PIF_VALUE: 13
PIF_VALUE: 14
PIF_VALUE: 22
PIF_VALUE: 3
PIF_VALUE: 15
PIF_VALUE: 14
PIF_VALUE: 1
PIF_VALUE: 15
PIF_VALUE: 15
PIF_VALUE: 14
PIF_VALUE: 12
PIF_VALUE: 14
PIF_VALUE: 15
PIF_VALUE: 15
PIF_VALUE: 17
PIF_VALUE: 15
PIF_VALUE: 0
PIF_VALUE: 14
PIF_VALUE: 15
PIF_VALUE: 15
PIF_VALUE: 17
PIF_VALUE: 15
PIF_VALUE: 17
PIF_VALUE: 13
PIF_VALUE: 15
PIF_VALUE: 19
PIF_VALUE: 13
PIF_VALUE: 15
PIF_VALUE: 15
PIF_VALUE: 14
PIF_VALUE: 15
PIF_VALUE: 14
PIF_VALUE: 17
PIF_VALUE: 15
PIF_VALUE: 0
PIF_VALUE: 15
PIF_VALUE: 14
PIF_VALUE: 15
PIF_VALUE: 15
PIF_VALUE: 12
PIF_VALUE: 7
PIF_VALUE: 15
PIF_VALUE: 15
PIF_VALUE: 19
PIF_VALUE: 23
PIF_VALUE: 15
PIF_VALUE: 22
PIF_VALUE: 15
PIF_VALUE: 15
PIF_VALUE: 14
PIF_VALUE: 15
PIF_VALUE: 13
PIF_VALUE: 15
PIF_VALUE: 0
PIF_VALUE: 14
PIF_VALUE: 15
PIF_VALUE: 14
PIF_VALUE: 4
PIF_VALUE: 17
PIF_VALUE: 17
PIF_VALUE: 13
PIF_VALUE: 14
PIF_VALUE: 15
PIF_VALUE: 14
PIF_VALUE: 14
PIF_VALUE: 15
PIF_VALUE: 19
PIF_VALUE: 14
PIF_VALUE: 12
PIF_VALUE: 17
PIF_VALUE: 2

## 2020-11-03 ASSESSMENT — PAIN SCALES - GENERAL
PAINLEVEL_OUTOF10: 2
PAINLEVEL_OUTOF10: 6
PAINLEVEL_OUTOF10: 0
PAINLEVEL_OUTOF10: 4
PAINLEVEL_OUTOF10: 2
PAINLEVEL_OUTOF10: 7
PAINLEVEL_OUTOF10: 3
PAINLEVEL_OUTOF10: 0
PAINLEVEL_OUTOF10: 7
PAINLEVEL_OUTOF10: 8

## 2020-11-03 ASSESSMENT — PAIN DESCRIPTION - ONSET
ONSET: ON-GOING

## 2020-11-03 ASSESSMENT — PAIN DESCRIPTION - PAIN TYPE
TYPE: SURGICAL PAIN

## 2020-11-03 ASSESSMENT — PAIN DESCRIPTION - LOCATION
LOCATION: HIP

## 2020-11-03 ASSESSMENT — PAIN DESCRIPTION - FREQUENCY
FREQUENCY: CONTINUOUS

## 2020-11-03 ASSESSMENT — PAIN - FUNCTIONAL ASSESSMENT
PAIN_FUNCTIONAL_ASSESSMENT: PREVENTS OR INTERFERES SOME ACTIVE ACTIVITIES AND ADLS
PAIN_FUNCTIONAL_ASSESSMENT: 0-10
PAIN_FUNCTIONAL_ASSESSMENT: PREVENTS OR INTERFERES SOME ACTIVE ACTIVITIES AND ADLS

## 2020-11-03 ASSESSMENT — PAIN DESCRIPTION - DESCRIPTORS
DESCRIPTORS: SORE
DESCRIPTORS: SORE
DESCRIPTORS: ACHING
DESCRIPTORS: SORE
DESCRIPTORS: ACHING
DESCRIPTORS: SORE
DESCRIPTORS: SORE

## 2020-11-03 ASSESSMENT — PAIN DESCRIPTION - ORIENTATION
ORIENTATION: LEFT

## 2020-11-03 ASSESSMENT — PAIN DESCRIPTION - PROGRESSION
CLINICAL_PROGRESSION: GRADUALLY WORSENING
CLINICAL_PROGRESSION: GRADUALLY IMPROVING
CLINICAL_PROGRESSION: GRADUALLY IMPROVING
CLINICAL_PROGRESSION: GRADUALLY WORSENING
CLINICAL_PROGRESSION: GRADUALLY WORSENING
CLINICAL_PROGRESSION: GRADUALLY IMPROVING
CLINICAL_PROGRESSION: GRADUALLY WORSENING
CLINICAL_PROGRESSION: GRADUALLY WORSENING
CLINICAL_PROGRESSION: NOT CHANGED
CLINICAL_PROGRESSION: GRADUALLY IMPROVING

## 2020-11-03 ASSESSMENT — ENCOUNTER SYMPTOMS: SHORTNESS OF BREATH: 0

## 2020-11-03 NOTE — PLAN OF CARE
Problem: Cardiac:  Goal: Ability to maintain vital signs within normal range will improve  Description: Ability to maintain vital signs within normal range will improve  Outcome: Ongoing  Note: VSS during this shift; will continue to monitor and assess     Problem: Discharge Planning:  Goal: Knowledge of discharge instructions  Description: Knowledge of discharge instructions  Outcome: Ongoing  Note: Patient anticipates d/c home tomorrow w/ wife and home care      Problem: Infection - Surgical Site:  Goal: Signs of wound healing will improve  Description: Signs of wound healing will improve  Outcome: Ongoing     Problem: Mobility - Impaired:  Goal: Achieve maximum mobility level  Description: Achieve maximum mobility level  Outcome: Ongoing  Note: Patient up x1 w/ a walker; will continue to promote ambulation      Problem: Pain - Acute:  Goal: Pain level will decrease  Description: Pain level will decrease  Outcome: Ongoing  Note: Pain managed with pharmacologic and non-pharmacologic interventions during this shift. Will continue to monitor and assess for needs and change in patient condition. Problem: Falls - Risk of:  Goal: Will remain free from falls  Description: Will remain free from falls  Outcome: Ongoing  Note: Patient remains free from falls during this shift. Fall precautions remain in place. Patient educated on the need to implement call light use prior to ambulation. Will continue to monitor and assess. Goal: Absence of physical injury  Description: Absence of physical injury  Outcome: Ongoing  Note: Patient remains free from physical harm during this shift. Will continue to monitor and assess patient. Problem: Pain:  Goal: Pain level will decrease  Description: Pain level will decrease  Outcome: Ongoing  Note: Pain managed with pharmacologic and non-pharmacologic interventions during this shift. Will continue to monitor and assess for needs and change in patient condition.      Goal: Control of acute pain  Description: Control of acute pain  Outcome: Ongoing  Note: Pain managed with pharmacologic and non-pharmacologic interventions during this shift. Will continue to monitor and assess for needs and change in patient condition. Goal: Control of chronic pain  Description: Control of chronic pain  Outcome: Ongoing  Note: Pain managed with pharmacologic and non-pharmacologic interventions during this shift. Will continue to monitor and assess for needs and change in patient condition.

## 2020-11-03 NOTE — BRIEF OP NOTE
Brief Postoperative Note      Patient: Cristóbal Merritt  YOB: 1950  MRN: [id number]    Date of Procedure: 11/3/2020    Pre-Op Diagnosis: LEFT HIP ARTHRITIS    Post-Op Diagnosis: Same       Procedure(s):  LEFT ANTERIOR TOTAL HIP REPLACEMENT WITH C-ARM    Surgeon(s):  MD Rebecca Andrew MD    Assistant:  Physician Assistant: MINOR Segura    Anesthesia: General    Estimated Blood Loss (mL): 409     Complications: Other: Small surgical grade steel c ring 1 cm x .1 cm broke off acetabular trial and was not retrieved. There was a 25 minute attempt to find the ring however unsuccessful. There should be no increased risk of implant or overall outcome due to the retained hardware. The risk of continued blood loss and open wound out weighted the risk of retention of a small surgical steel ring. Patient family aware of this. Specimens:   ID Type Source Tests Collected by Time Destination   A : left hip bone and tissue Bone Bone SURGICAL PATHOLOGY Yakelin Mitchell MD 11/3/2020 1058        Implants:  Implant Name Type Inv.  Item Serial No.  Lot No. LRB No. Used Action   CUP ACET [id number] HIP GRIPTION MELANIE CEMENTLESS FIX SECT SER  CUP ACET [id number] HIP GRIPTION MELANIE CEMENTLESS FIX SECT SER  [facility] [employer] [facility] [id number] Left 1 Implanted   HEAD FEM [id number] +1.5MM OFFSET 12/14 TAPR ARTC EZ HIP MTL  HEAD FEM [id number] +1.5MM OFFSET 12/14 TAPR ARTC EZ HIP MTL  [facility] [employer] [facility] [id number] Left 1 Implanted   STEM FEM SZ 7 HIP HI OFFSET CLLRD CEMENTLESS 12/14 TAPR  STEM FEM SZ 7 HIP HI OFFSET CLLRD CEMENTLESS 12/14 TAPR  [facility] [employer] [facility] [id number] Left 1 Implanted   LINER ACET OD56MM ID40MM +4MM OFFSET HIP POLYETH MTL ON  LINER ACET OD56MM ID40MM +4MM OFFSET HIP POLYETH MTL ON  [facility] [employer] [id number] Left 1 Implanted         Drains: * No LDAs found *    Findings: OA L hip    The patient was counseled at length about the risks of rosaura Covid-19 during their perioperative period and any recovery window from their procedure. The patient was made aware that rosaura Covid-19  may worsen their prognosis for recovering from their procedure  and lend to a higher morbidity and/or mortality risk. All material risks, benefits, and reasonable alternatives including postponing the procedure were discussed. The patient does wish to proceed with the procedure at this time.           Electronically signed by Chirag Dacosta MD on 11/3/2020 at 11:53 AM

## 2020-11-03 NOTE — PROGRESS NOTES
Hand off report from [facility]. Patient awake and alert. Left hip dressing dry and intact with ice pack on and stable neurovascular checks bilat. Patient C/O hip pain at 8 of 10 and medicated per order. See MAR. VSS. IV patent. Repositioned up in bed for comfort. Moving all extremities.

## 2020-11-03 NOTE — H&P
CERTIFICATE OF RETURN TO WORK    December 18, 2019      Re:   Shari Broussard  [address] [facility] [id number]                        This is to certify that Shari Broussard was seen on 12/18/2019 and can return to regular work on 12/19/19. RESTRICTIONS: none.         SIGNATURE:___________________________________________,   12/18/2019      Quin Gonzalez, [address] [place] [address], [address] [place] [address],[address] [facility] [address]  Dept Phone: [phone] Preoperative H&P Update     The patient's History and Physical in the medical record dated 11/3/20 was reviewed by me today. I reviewed the HPI, medications, allergies, reason for surgery, diagnosis and treatment plan and there has been no change. The patient was evaluated by me today. Prior to Visit Medications    Medication Sig Taking? Authorizing Provider   Cholecalciferol (VITAMIN D3) 50 MCG (2000 UT) CAPS Take by mouth daily Yes Historical Provider, MD   metFORMIN (GLUCOPHAGE) 500 MG tablet Take 1 tablet by mouth 2 times daily (with meals) Yes Halian Gongora PA-C   CRESTOR 40 MG tablet TAKE ONE TABLET BY MOUTH EVERY EVENING Yes Chriss James PA-C   fosinopril (MONOPRIL) 10 MG tablet TAKE ONE TABLET BY MOUTH DAILY  Patient taking differently: Take 10 mg by mouth every evening  Yes Chriss James PA-C   Thiamine HCl (VITAMIN B-1 PO) Take  by mouth daily. Yes Historical Provider, MD       Physical exam findings for this update include:  Vitals:    11/03/20 0849   BP: 124/85   Pulse: 69   Resp: 14   Temp: 98 °F (36.7 °C)   SpO2: 96%     Airway is intact Chest: breathing comfortably  Heart: regular rate and rhythm. Examination of the body region where surgery is to be performed shows skin is intact at the operative site. The risk, benefits, and alternatives of the proposed procedure have been explained to the patient (or appropriate guardian) and understanding verbalized. All questions answered. Patient wishes to proceed. The patient was counseled at length about the risks of rosaura Covid-19 during their perioperative period and any recovery window from their procedure. The patient was made aware that rosaura Covid-19  may worsen their prognosis for recovering from their procedure  and lend to a higher morbidity and/or mortality risk. All material risks, benefits, and reasonable alternatives including postponing the procedure were discussed.  The patient does wish to proceed with the procedure at this time.           Nati Contreras MD    Electronically signed by Ramírez Turner MD on 11/3/2020 at 9:41 AM

## 2020-11-03 NOTE — PROGRESS NOTES
Occupational Therapy   Occupational Therapy Initial Assessment  Date: 11/3/2020   Patient Name: Yves Primrose  MRN: [id number]     : 1950    Date of Service: 11/3/2020    Assessment: Pt is 69 y.o. M who presents with arhtritis of L hip. Pt is s/p L KALA and is WBAT with anterior hip precautions. PTA pt lives with wife in two story home with 2 JT. Pt reports independence in self-care tasks, wife completes homemaking responsibilities, and pt completes functional mobility with no device. Currently, pt presents with ROM/strength in [facility] for self-care and transfers. Pt completed bed mobility with SBA and sit <> stand transfers with CGA. Pt completed functional mobility with RW with CGA, no overt LOB. Pt completed toileting with CGA, anticipate pt to require Kim for ADL needs at d/c. Anticipate pt safe to d/c home with wife with  supervision/assist and home health OT. Discharge Recommendations:  24 hour supervision or assist, Home with Home health [facility] [address]: LEVEL 1 STANDARD    - Initial home health evaluation to occur within 24-48 hours, in patient home   - Therapy to evaluate with goal of regaining prior level of functioning   - Therapy to evaluate if patient has [address] needs for personal care    Assessment   Performance deficits / Impairments: Decreased functional mobility ; Decreased balance;Decreased ADL status; Decreased endurance;Decreased strength  Assessment: Pt is 69 y.o. M who presents with arhtritis of L hip. Pt is s/p L KALA and is WBAT with anterior hip precautions. PTA pt lives with wife in two story home with 2 JT. Pt reports independence in self-care tasks, wife completes homemaking responsibilities, and pt completes functional mobility with no device. Currently, pt presents with ROM/strength in [facility] for self-care and transfers. Pt completed bed mobility with SBA and sit <> stand transfers with CGA.  Pt completed functional mobility with RW with CGA, no overt transfers    Functional Mobility  Functional - Mobility Device: Rolling Walker  Activity: To/from bathroom  Assist Level: Contact guard assistance  Functional Mobility Comments: Pt completed functional mobility with RW with CGA steady with no overt LOB    Toilet Transfers  Toilet - Technique: Ambulating(RW)  Equipment Used: Drop-arm commode(elevated from commode)  Toilet Transfer: Contact guard assistance    ADL  Toileting: (Assist to manage clothing, successful in voiding)  Additional Comments: PTA pt reports independence in self-care tasks. Anticipate pt to require min A for LB ADLs, SBA for UB ADLs, CGA for toileting. Tone RUE  RUE Tone: Normotonic  Tone LUE  LUE Tone: Normotonic  Coordination  Movements Are Fluid And Coordinated: Yes     Bed mobility  Supine to Sit: Stand by assistance(HOB elevated)  Sit to Supine: Unable to assess(Up in chair at end of session)     Transfers  Sit to stand: Contact guard assistance  Stand to sit: Contact guard assistance  Transfer Comments: CGA for sit <> stand from EOB to RW and sit to recliner chair, cues for hip precautions and safe hand placement     Cognition  Overall Cognitive Status: WFL        Sensation  Overall Sensation Status: WFL        LUE AROM (degrees)  LUE AROM : WFL  Left Hand AROM (degrees)  Left Hand AROM: WFL  RUE AROM (degrees)  RUE AROM : WFL  Right Hand AROM (degrees)  Right Hand AROM: WFL     LUE Strength  Gross LUE Strength: WFL  RUE Strength  Gross RUE Strength: WFL          Plan   Plan  Times per week: 1 or 2 more sessions  Current Treatment Recommendations: Strengthening, Balance Training, Functional Mobility Training, Endurance Training, Patient/Caregiver Education & Training, Safety Education & Training      AM-PAC Score    [employer] scored a 19/24 on the AM-PAC ADL Inpatient form. Current research shows that an AM-PAC score of 18 or greater is typically associated with a discharge to the patient's home setting.  Based on the patient's AM-PAC

## 2020-11-03 NOTE — ANESTHESIA PRE PROCEDURE
Department of Anesthesiology  Preprocedure Note       Name:  Kiki Patrick   Age:  69 y.o.  :  1950                                          MRN:  [id number]         Date:  11/3/2020      Surgeon: Demetrice Kim):  Jules Griffin MD    Procedure: Procedure(s):  LEFT ANTERIOR TOTAL HIP REPLACEMENT WITH C-ARM    Medications prior to admission:   Prior to Admission medications    Medication Sig Start Date End Date Taking? Authorizing Provider   metFORMIN (GLUCOPHAGE) 500 MG tablet Take 1 tablet by mouth 2 times daily (with meals) 10/14/17   Alyx Garland PA-C   CRESTOR 40 MG tablet TAKE ONE TABLET BY MOUTH EVERY EVENING 16   Kahlil Daniel PA-C   fosinopril (MONOPRIL) 10 MG tablet TAKE ONE TABLET BY MOUTH DAILY  Patient taking differently: Take 10 mg by mouth every evening  16   Kahlil Daniel PA-C   Thiamine HCl (VITAMIN B-1 PO) Take  by mouth daily. 2/9/10   Historical Provider, MD       Current medications:    Current Facility-Administered Medications   Medication Dose Route Frequency Provider Last Rate Last Dose    0.9 % sodium chloride infusion   Intravenous Continuous Abbey Hayes MD        sodium chloride flush 0.9 % injection 10 mL  10 mL Intravenous 2 times per day Abbey Hayes MD        sodium chloride flush 0.9 % injection 10 mL  10 mL Intravenous PRN Abbey Hayes MD        ceFAZolin (ANCEF) 2 g in dextrose 5 % 100 mL IVPB  2 g Intravenous Once Jules Griffin MD        oxyCODONE HCl (OXY-IR) immediate release tablet 10 mg  10 mg Oral Once Jules Griffin MD           Allergies:     Allergies   Allergen Reactions    Sulfa Antibiotics Rash and Hives       Problem List:    Patient Active Problem List   Diagnosis Code    Elbow pain M25.529    Hyperlipidemia E78.5    HTN (hypertension) I10    Vitamin D deficiency E55.9    MTHFR mutation ([facility] [address] E72.12    Insulin resistance E88.81    Lumbosacral radiculopathy at L5 M54.17    Pain of left hip joint M25.552    Arthritis of left hip M16.12    Spinal stenosis of lumbar region M48.061    Postlaminectomy syndrome M96.1    Status post lumbar spinal fusion Z98.1    10/12/17 Removal fixation T11-L3 ; laminectomy L3-4 L4-L5; posterior spinal fusion L2-L3, L3-L4  M21.372    Arthritis of left shoulder region M19.012    Primary osteoarthritis of right knee M17.11       Past Medical History:        Diagnosis Date    HBP (high blood pressure)     Hyperlipidemia 11/10/2011    Insulin resistance     Lumbosacral radiculopathy at L5 9/14/2017    MDRO (multiple drug resistant organisms) resistance     tx successfully 2 yr ago    QI on CPAP     Primary osteoarthritis of right knee 9/10/2020       Past Surgical History:        Procedure Laterality Date    ANKLE SURGERY  30 years ago    left    BACK SURGERY  30 years ago    BACK SURGERY  10/12/2017    removal of agustin hardware T11- L3,4, spinal fusion L2-L5    KNEE SURGERY  5 years ago    right       Social History:    Social History     Tobacco Use    Smoking status: Never Smoker    Smokeless tobacco: Never Used   Substance Use Topics    Alcohol use: Yes     Comment: occ. Counseling given: Not Answered      Vital Signs (Current): There were no vitals filed for this visit.                                            BP Readings from Last 3 Encounters:   03/02/20 130/86   02/04/20 122/72   01/30/20 136/82       NPO Status:   >8hrs                                                                                BMI:   Wt Readings from Last 3 Encounters:   09/08/20 270 lb (122.5 kg)   06/11/20 270 lb (122.5 kg)   03/02/20 270 lb (122.5 kg)     There is no height or weight on file to calculate BMI.    CBC:   Lab Results   Component Value Date    WBC 8.6 10/29/2020    RBC 4.93 10/29/2020    HGB 15.7 10/29/2020    HCT 45.9 10/29/2020    MCV 93.2 10/29/2020    RDW 13.5 10/29/2020     10/29/2020       CMP:   Lab Results   Component Value Date     10/29/2020    K 4.5 10/29/2020     10/29/2020    CO2 25 10/29/2020    BUN 16 10/29/2020    CREATININE 1.0 10/29/2020    GFRAA >60 10/29/2020    GFRAA 105 03/15/2012    AGRATIO 1.3 10/05/2017    LABGLOM >60 10/29/2020    GLUCOSE 138 10/29/2020    GLUCOSE 123 03/15/2012    PROT 7.2 10/05/2017    PROT 7.2 12/19/2012    CALCIUM 10.3 10/29/2020    BILITOT 0.6 10/05/2017    ALKPHOS 52 10/05/2017    AST 13 10/05/2017    ALT 19 10/05/2017       POC Tests: No results for input(s): POCGLU, POCNA, POCK, POCCL, POCBUN, POCHEMO, POCHCT in the last 72 hours.     Coags:   Lab Results   Component Value Date    PROTIME 11.1 10/29/2020    INR 0.96 10/29/2020    APTT 28.1 10/29/2020       HCG (If Applicable): No results found for: PREGTESTUR, PREGSERUM, HCG, HCGQUANT     ABGs: No results found for: PHART, PO2ART, QNG7FVM, MDE4VKG, BEART, Q0BJTCCI     Type & Screen (If Applicable):  No results found for: LABABO, LABRH    Drug/Infectious Status (If Applicable):  No results found for: HIV, HEPCAB    COVID-19 Screening (If Applicable):   Lab Results   Component Value Date    COVID19 Not Detected 10/28/2020         Anesthesia Evaluation  Patient summary reviewed no history of anesthetic complications:   Airway: Mallampati: II  TM distance: >3 FB   Neck ROM: full  Mouth opening: > = 3 FB Dental:      Comment: crowns    Pulmonary: breath sounds clear to auscultation  (+) sleep apnea: on CPAP,      (-) COPD, asthma, shortness of breath and recent URI                           Cardiovascular:    (+) hypertension:, hyperlipidemia    (-) valvular problems/murmurs, past MI, CAD, CABG/stent, dysrhythmias,  angina,  CHF, orthopnea and murmur    ECG reviewed  Rhythm: regular  Rate: normal                    Neuro/Psych:   (+) neuromuscular disease:,    (-) seizures, TIA, CVA, headaches and psychiatric history           GI/Hepatic/Renal:        (-) GERD (takes tums occ), PUD, hepatitis, liver disease, no renal disease and

## 2020-11-03 NOTE — PROGRESS NOTES
Patient awake and alert. Resp easy unlabored on 2L NC with SaO2 93%. Left hip dressing dry and intact. Ice pack on with stable neurovascular checks bilat. VSS. IV patent. Moving all extremities to command. Pain level 2 of 10 and tolerable per patient. Patient stable to transfer to med/surg when bed available.

## 2020-11-03 NOTE — PROGRESS NOTES
Patient up to chair at this time; reports pain is tolerable and denies the need for pharmacologic interventions. Ice packs remain in place to surgical site. Patient denies physical/emotional needs at this time. Will continue to monitor and assess.

## 2020-11-03 NOTE — PROGRESS NOTES
[id number] [facility] Orthopedic Surgery   Progress Note      S/P :  SUBJECTIVE  Seen in PACU Alert and oriented. . Pain is   described in lef thip and with the intensity of mild. Pain is described as aching. OBJECTIVE              Physical                      VITALS:  BP (!) 145/91   Pulse 56   Temp 97 °F (36.1 °C) (Temporal)   Resp 12   Ht 6' (1.829 m)   Wt 257 lb 11.5 oz (116.9 kg)   SpO2 97%   BMI 34.95 kg/m²                     MUSCULOSKELETAL:  left foot NVI. Wiggles toes to command. Pedal pulses are palpable. NEUROLOGIC:                                  Sensory:  Touch:  Left Lower Extremity:  abnormal - tingling  In foot. Pt states this is chronic                                                 Surgical wound appears with light pink shadow on left hip dressing. Ice pack on. SUNI hose on.      Data       CBC:   Lab Results   Component Value Date    WBC 8.6 10/29/2020    RBC 4.93 10/29/2020    HGB 15.7 10/29/2020    HCT 45.9 10/29/2020    MCV 93.2 10/29/2020    MCH 31.9 10/29/2020    MCHC 34.2 10/29/2020    RDW 13.5 10/29/2020     10/29/2020    MPV 7.9 10/29/2020        WBC:    Lab Results   Component Value Date    WBC 8.6 10/29/2020        Hemoglobin/Hematocrit:    Lab Results   Component Value Date    HGB 15.7 10/29/2020    HCT 45.9 10/29/2020        PT/INR:    Lab Results   Component Value Date    PROTIME 11.1 10/29/2020    INR 0.96 10/29/2020              Current Inpatient Medications             Current Facility-Administered Medications: 0.9 % sodium chloride infusion, , Intravenous, Continuous  sodium chloride flush 0.9 % injection 10 mL, 10 mL, Intravenous, 2 times per day  sodium chloride flush 0.9 % injection 10 mL, 10 mL, Intravenous, PRN  fentaNYL (SUBLIMAZE) injection 25 mcg, 25 mcg, Intravenous, Q5 Min PRN  HYDROmorphone (DILAUDID) injection 0.5 mg, 0.5 mg, Intravenous, Q5 Min PRN  fentaNYL (SUBLIMAZE) injection 25 mcg, 25 mcg, Intravenous, Q5 Min PRN  HYDROmorphone (DILAUDID) injection 0.5 mg, 0.5 mg, Intravenous, Q5 Min PRN  oxyCODONE-acetaminophen (PERCOCET) 5-325 MG per tablet 1 tablet, 1 tablet, Oral, PRN **OR** oxyCODONE-acetaminophen (PERCOCET) 5-325 MG per tablet 2 tablet, 2 tablet, Oral, PRN  ondansetron (ZOFRAN) injection 4 mg, 4 mg, Intravenous, Once PRN  povidone-iodine (BETADINE) 10 % 30 mL in sodium chloride 0.9 % 1,000 mL irrigation, , , Continuous PRN    ASSESSMENT AND PLAN      Post left KALA, stable exam  DVT prophylaxis ordered, Lovenox as inpt then switch to ASA 81mg twice at day for 30 days for DVT prophylaxis  PT OT for ADL's and ambulation as tolerated, anterior hip precautions  SS for DC planning, home with home care tomorrow  IV or PO pain med as ordered    Nicola Springer  11/3/2020  1:20 PM

## 2020-11-03 NOTE — PROGRESS NOTES
Physical Therapy    Facility/Department: [facility] 3W ORTHOPEDICS  Initial Assessment    NAME: Tonia James  : 1950  MRN: [id number]    Date of Service: 11/3/2020    Assessment / Discharge Recommendations:  -good start with initial mobility OOB and to ambulate in room  -anticipate home tomorrow with family assist and Home PT  -will see in AM to assess readiness for home  -instructed in Hip precautions, poster placed to room, will continue to review these with patient and his wife    Body structures, Functions, Activity limitations: Decreased functional mobility ; Decreased ADL status; Decreased strength  Prognosis: Good  Decision Making: Medium Complexity  REQUIRES PT FOLLOW UP: Yes  Activity Tolerance  Activity Tolerance: Patient Tolerated treatment well       Patient Diagnosis(es): The encounter diagnosis was Arthritis of left hip.   has a past medical history of HBP (high blood pressure), Hyperlipidemia, Insulin resistance, Lumbosacral radiculopathy at L5, MDRO (multiple drug resistant organisms) resistance, QI on CPAP, and Primary osteoarthritis of right knee. has a past surgical history that includes knee surgery (5 years ago); Ankle surgery (30 years ago); back surgery (30 years ago); and back surgery (10/12/2017).     Restrictions  Restrictions/Precautions  Restrictions/Precautions: Fall Risk  Lower Extremity Weight Bearing Restrictions  Left Lower Extremity Weight Bearing: Weight Bearing As Tolerated  Position Activity Restriction  Hip Precautions: No hip flexion > 90 degrees, No hip external rotation, No ADduction, No hip extension  Vision/Hearing  Vision: Within Functional Limits  Hearing: Within functional limits     Subjective  General  Chart Reviewed: Yes  Patient assessed for rehabilitation services?: Yes  Additional Pertinent Hx: here for elective left THR    (PMH of spinal surgery)  Response To Previous Treatment: Not applicable  Family / Caregiver Present: Yes(wife)  Follows Commands: Within Functional Limits  Subjective  Subjective: arrived to room along with OT - patient resting in bed - alert oriented and agreeable to PTOT assessments and OOB to ambulate in room  Pain Screening  Patient Currently in Pain: (minimal pain at rest)  Orientation  Orientation  Overall Orientation Status: Within Functional Limits  Social/Functional History  Social/Functional History  Lives With: Spouse  Type of Home: House  Home Layout: Two level, Laundry in basement, 1/2 bath on main level, Bed/Bath upstairs  Home Access: Stairs to enter with rails  Entrance Stairs - Number of Steps: 2  Bathroom Shower/Tub: Tub/Shower unit, Shower chair with back  [place] Toilet: Handicap height  Bathroom Equipment: Hand-held shower  Bathroom Accessibility: Walker accessible  Home Equipment: Rolling walker, Cane, Crutches  ADL Assistance: Independent  Homemaking Assistance: (Spouse completes)  Ambulation Assistance: Independent  Transfer Assistance: Independent  Active : Yes  Cognition   Cognition  Overall Cognitive Status: WFL  Objective  ROM and strength non-surgical limbs grossly wfl  Motor Control  Gross Motor?: WFL  Sensation  Overall Sensation Status: WFL  Bed mobility  Supine to Sit: Stand by assistance  Sit to Supine: Unable to assess  Transfers  Sit to Stand: Contact guard assistance  Stand to sit: Contact guard assistance  Ambulation  Ambulation?: Yes  Ambulation 1  Surface: level tile  Device: Rolling Walker  Assistance: Contact guard assistance  Quality of Gait: step to pattern with fair heel contact and fair weight bearing   (states he had to \"toss\" his left leg forward and it was short so tended to not get good heel contact)  Distance: in room for ~30 feet  Comments: steady  Stairs/Curb  Stairs?: No     Balance  Comments: midline in sitting and static stance on the walker  -dynamic is good on rolling walker  Exercises  Ankle Pumps: 30 per hour in easy pace  Comments: encouraged practice with the spirometer as instructed by RT     Plan   Plan  Times per week: 1-4 sessions  Current Treatment Recommendations: Transfer Training, ADL/Self-care Training, Patient/Caregiver Education & Training, Modalities, Gait Training, Stair training, Positioning  Safety Devices  Type of devices:  All fall risk precautions in place, Call light within reach, Chair alarm in place, Left in chair, Nurse notified(jose)    Goals  Short term goals  Time Frame for Short term goals: 1-2 days  Short term goal [facility] 2: transfers at supervision  Short term goal 3: ambulation at supervision wbat rolling walker for household distances    -steps as needed for home entry at [address] [place] [address] term goal 4: exercises at supervision  Patient Goals   Patient goals : good function of his new hip and good walking       Therapy Time   Individual Concurrent Group Co-treatment   Time In       1530   Time Out       1600   Minutes       [facility],

## 2020-11-03 NOTE — PROGRESS NOTES
Educated patient on purpose of 4 eyes skin assessment and asked patient if allowed to perform, patient verbalized understanding and agreed to assessment. 4 Eyes Skin Assessment     The patient is being assess for  Post-Op Surgical    I agree that 2 RN's have performed a thorough Head to Toe Skin Assessment on the patient. ALL assessment sites listed below have been assessed.        Areas assessed by both nurses: Georgina Barr  [x]   Head, Face, and Ears   [x]   Shoulders, Back, and Chest  [x]   Arms, Elbows, and Hands   [x]   Coccyx, Sacrum, and IschIum  [x]   Legs, Feet, and Heels        Does the Patient have Skin Breakdown?  blanchabole redness bilat ears         Richard Prevention initiated:  NA   Wound Care Orders initiated:  NA      WOC nurse consulted for Pressure Injury (Stage 3,4, Unstageable, DTI, NWPT, and Complex wounds), New and Established Ostomies:  NA      Nurse 1 eSignature: Electronically signed by Maxim Posada RN on 11/3/20 at 5:22 PM EST    **SHARE this note so that the co-signing nurse is able to place an eSignature**    Nurse 2 eSignature: Electronically signed by Carlos Albrecht RN on 11/3/20 at 6:31 PM EST

## 2020-11-03 NOTE — OP NOTE
[address] [facility]                                OPERATIVE REPORT    PATIENT NAME: Cheryl Fuller                      :        1950  MED REC NO:   [id number]                          ROOM:  ACCOUNT NO:   [de-identified]                           ADMIT DATE: 2020  PROVIDER:     Lupe Schwartz MD    DATE OF PROCEDURE:  2020    PREOPERATIVE DIAGNOSIS:  Degenerative osteoarthritis of the left hip. POSTOPERATIVE DIAGNOSIS:  Degenerative osteoarthritis of the left hip. OPERATION PERFORMED:  Left anterior total hip arthroplasty. SURGEON:  Lupe Schwartz MD    ASSISTANTS:  MINOR Still; also present during surgical procedure,  Dr. Luella Angelucci. ESTIMATED BLOOD LOSS:  300 mL. INTRAOPERATIVE COMPLICATION:  The small surgical steel C-clamp liner  that was part of the trial liner of the acetabulum was broken off and  lodged somewhere within the hip region. We spent about 25 minutes  looking for this both with x-ray and digital evaluation, however, none  of this was not identified. This risk of continuing the surgical  procedure with continuing the blood loss versus living with a surgical  steel foreign body, which will have no impact on outcome or overall  longevity of the hip joint, we felt that it was safer to proceed with  the surgical procedure. OPERATIVE PROCEDURE:  The patient was brought to the operating room. Once anesthetic was obtained and intravenous antibiotics delivered, he  was placed on the [place] table in supine position. His left leg and foot  were prepped and draped in sterile fashion. An incision was made over the tensor fascia susanna and dissection carried  down onto the muscle belly. The fascia over the tensor was split and  dissection was carried medial to the tensor, lateral to the sartorius,  and reflected ahead of the rectus femoris.   The circumflex artery was  identified and coagulated. Anterior capsulectomy was performed exposing  the femoral neck. Proximal femoral osteotomy was performed and the  remaining head and proximal femoral neck was removed. The acetabulum was exposed and the remaining debris was removed. The  acetabulum was reamed to accept a 56-mm uncemented cup. This was  hammered into place in the appropriate amount of anteversion and  abduction and confirmed with C-arm. The cup had excellent fit, and so,  no screws were used to secure the fixation. The trial liner was placed  within the acetabulum. Our attention then turned to the femoral side and the femur was rasped  up to accept a #7 high-offset Actis stem. A trial reduction with the 7  high-offset Actis stem and a 1.5 x 40 mm head segment was placed. The  hip was reduced and taken through a stable range of motion. The C-arm  was brought in to confirm good placement of the hardware. At this point  in time, the hip was dislocated. All trial implants were removed along  with the trial cup implant. It was identified immediately that a small  C-ring type washer was not removed from the patient. This is a surgical  steel ring that is about 1 cm x may be 0.1 or 0.2 cm or 1 or 2 mm in  thickness. It was identified in the lateral aspect of the femur. We spent 25 minutes or so looking for this both digitally with use of  x-ray, etc., however, it had embedded itself such that we could not  identify it. The risk of proceeding with continued exploration and  finding this small surgical steel foreign body outweighed the risk of  actually spending the time. Therefore, we abandon the search for this,  knowing that it will not affect the overall outcome, prognosis or  longevity of the hip joint. This was discussed with the family in the  postoperative waiting room.     Once we had done this, then the real +4 neutral liner with a 40 mm inner  diameter and a 56 outer diameter was placed into the acetabulum. The 7  high-offset Actis stem was hammered into place and the 1.5, 40-mm metal  head segment was placed over the Dahlia Moses taper. The hip was reduced. The  C-arm was brought in and it showed good position of the hardware and  also leg length equity. The wound was irrigated out. Hemostasis was obtained. The wound was  injected with 100 mL of ropivacaine, morphine, cefuroxime, and  methylprednisolone. It was also bathed for five minutes with 3 gm of  tranexamic acid and then finally aqueous iodine; 1 gm of vancomycin was  placed within the joint for further antibiotic prophylaxis. The  patient's wound was closed in layers. A dressing was applied, and he  was brought to the recovery room in good condition. RADHA Preston MD    D: 11/03/2020 12:09:47       T: 11/03/2020 13:45:03     [email or web address]  Job#: [id number]     Doc#: [id number]    CC:

## 2020-11-03 NOTE — PROGRESS NOTES
Patient arrived to unit from PACU and was placed into room 3122. Patient alert and oriented x4; reporting moderate pain to L hip. Patient aware that medications must be verified prior to administration; see eMAR for documentation. Patient oriented to call light use, meal ordering processes, and bed/chair alarm implementation. Patient reports understanding. Call light, telephone, and bed side table are within reach. Fall precautions in place. Will continue to monitor and assess.

## 2020-11-03 NOTE — PROGRESS NOTES
Met with patient and family (wife) at bedside, patient is alert and orientedx4. discussed role of nurse navigator and gave contact information. Reviewed reasons to call with questions or concerns, importance of TEDS, Incentive spirometer, pain medication, and physical and occupational therapy. 2/4 bed rails up, bed in lowest position, fall precautions in place, call light within reach. Pulses present bilaterally +2 pedal, no odor noted at surgical dressing left hip. dry Dressing intact with scant amount serosanguinous drainage noted. Ice in place. Eric and scds on BLEs. Neurovascular checks performed and WNLs with exception of baseline numbness. DC Plan: home with wife and [facility]. wife to transport patient.   DME needs:jesuies    Matt Tavares  Orthopedic Nurse Navigator  Phone number: [phone]    Future Appointments   Date Time Provider Abelardo Santiago   11/16/2020 10:30 AM MD Jarred Pina     Electronically signed by Flaquito Villalta RN on 11/3/2020 at 4:08 PM

## 2020-11-04 VITALS
HEIGHT: 74 IN | DIASTOLIC BLOOD PRESSURE: 66 MMHG | TEMPERATURE: 98.8 F | OXYGEN SATURATION: 92 % | WEIGHT: 258.82 LBS | SYSTOLIC BLOOD PRESSURE: 115 MMHG | BODY MASS INDEX: 33.22 KG/M2 | RESPIRATION RATE: 15 BRPM | HEART RATE: 88 BPM

## 2020-11-04 LAB
GLUCOSE BLD-MCNC: 223 MG/DL (ref 70–99)
HCT VFR BLD CALC: 40.9 % (ref 40.5–52.5)
HEMOGLOBIN: 13.8 G/DL (ref 13.5–17.5)
PERFORMED ON: ABNORMAL

## 2020-11-04 PROCEDURE — 97530 THERAPEUTIC ACTIVITIES: CPT

## 2020-11-04 PROCEDURE — 97535 SELF CARE MNGMENT TRAINING: CPT

## 2020-11-04 PROCEDURE — 97110 THERAPEUTIC EXERCISES: CPT

## 2020-11-04 PROCEDURE — 97116 GAIT TRAINING THERAPY: CPT

## 2020-11-04 PROCEDURE — 36415 COLL VENOUS BLD VENIPUNCTURE: CPT

## 2020-11-04 PROCEDURE — 85018 HEMOGLOBIN: CPT

## 2020-11-04 PROCEDURE — 6360000002 HC RX W HCPCS: Performed by: ORTHOPAEDIC SURGERY

## 2020-11-04 PROCEDURE — 85014 HEMATOCRIT: CPT

## 2020-11-04 PROCEDURE — 6370000000 HC RX 637 (ALT 250 FOR IP): Performed by: ORTHOPAEDIC SURGERY

## 2020-11-04 RX ORDER — CALCIUM CARBONATE 200(500)MG
500 TABLET,CHEWABLE ORAL 3 TIMES DAILY PRN
Status: DISCONTINUED | OUTPATIENT
Start: 2020-11-04 | End: 2020-11-04 | Stop reason: HOSPADM

## 2020-11-04 RX ORDER — FOSINOPRIL SODIUM 10 MG/1
20 TABLET ORAL NIGHTLY
COMMUNITY

## 2020-11-04 RX ORDER — FOSINOPRIL SODIUM 10 MG/1
20 TABLET ORAL EVERY EVENING
Qty: 1 TABLET | Refills: 0
Start: 2020-11-04 | End: 2020-11-04 | Stop reason: HOSPADM

## 2020-11-04 RX ADMIN — OXYCODONE HYDROCHLORIDE 10 MG: 10 TABLET ORAL at 10:06

## 2020-11-04 RX ADMIN — DOCUSATE SODIUM 50 MG AND SENNOSIDES 8.6 MG 1 TABLET: 8.6; 5 TABLET, FILM COATED ORAL at 09:02

## 2020-11-04 RX ADMIN — OXYCODONE HYDROCHLORIDE 10 MG: 10 TABLET ORAL at 05:46

## 2020-11-04 RX ADMIN — OXYCODONE HYDROCHLORIDE 10 MG: 10 TABLET ORAL at 01:36

## 2020-11-04 RX ADMIN — INSULIN LISPRO 2 UNITS: 100 INJECTION, SOLUTION INTRAVENOUS; SUBCUTANEOUS at 09:01

## 2020-11-04 RX ADMIN — ENOXAPARIN SODIUM 30 MG: 30 INJECTION SUBCUTANEOUS at 09:02

## 2020-11-04 RX ADMIN — CEFAZOLIN SODIUM 2 G: 10 INJECTION, POWDER, FOR SOLUTION INTRAVENOUS at 01:36

## 2020-11-04 ASSESSMENT — PAIN DESCRIPTION - ORIENTATION
ORIENTATION: LEFT

## 2020-11-04 ASSESSMENT — PAIN DESCRIPTION - DESCRIPTORS
DESCRIPTORS: ACHING

## 2020-11-04 ASSESSMENT — PAIN DESCRIPTION - PROGRESSION
CLINICAL_PROGRESSION: NOT CHANGED

## 2020-11-04 ASSESSMENT — PAIN DESCRIPTION - PAIN TYPE
TYPE: SURGICAL PAIN

## 2020-11-04 ASSESSMENT — PAIN DESCRIPTION - LOCATION
LOCATION: HIP

## 2020-11-04 ASSESSMENT — PAIN DESCRIPTION - FREQUENCY
FREQUENCY: CONTINUOUS

## 2020-11-04 ASSESSMENT — PAIN DESCRIPTION - ONSET
ONSET: ON-GOING

## 2020-11-04 ASSESSMENT — PAIN SCALES - GENERAL
PAINLEVEL_OUTOF10: 7

## 2020-11-04 ASSESSMENT — PAIN SCALES - WONG BAKER
WONGBAKER_NUMERICALRESPONSE: 0
WONGBAKER_NUMERICALRESPONSE: 0

## 2020-11-04 NOTE — PROGRESS NOTES
Data- discharge order received, patient verbalized agreement to discharge, disposition to previous residence, needs noted for [facility] and informed Sreedhar Pitts NP. Action- discharge instructions prepared and given to patient and wife, patient and wife verbalized understanding. Medication information packet given r/t NEW and/or CHANGED prescriptions emphasizing name/purpose/side effects, pt verbalized understanding. Discharge instruction summary: Diet- genreal, Activity- wbat, Primary Care Physician as follows: Emilee Castaneda -[phone]. f/u appointment with orthopedic office noted below, immunizations reviewed and discussed with patient, prescription medications to be filled by retail pharmacy and then delivered. Inpatient surgical procedure precautions reviewed: . Neurovascular check performed and patient is WNLs, denies numbness/tingling in extremties. Incision site  prineo glue dressing assessed and is  clean,dry, and intact, no signs of redness, drainage, or odor noted. patient's bedside RN tiki notified of patient completing discharge instructions and iv removal.    Response- IV removed. Medications to be delivered to patient via meds to bed program. Disposition is home with [facility] , to be transported with family, no complications.      Future Appointments   Date Time Provider Abelardo Santiago   11/16/2020 10:30 AM MD Raza Beth           Electronically signed by Maddison Zee RN on 11/4/2020 at 9:33 AM

## 2020-11-04 NOTE — DISCHARGE INSTR - COC
[facility]) Continuity of Care Form    Patient Name:  Anjel Valencia  : 1950    MRN:  [id number]    Admit date:  11/3/2020  Discharge date:  2020    Code Status Order: Full Code  Advance Directives: yes    Admitting Physician: Lesa Bruner MD  PCP: Dalia Allan MD    Discharging Nurse: [facility] [employer] [place] Unit/Room#: C6D-9224/3122-01  Discharging Unit Phone Number: [phone]    Emergency Contact:        Past Surgical History:  Past Surgical History:   Procedure Laterality Date    ANKLE SURGERY  30 years ago    left    BACK SURGERY  30 years ago    BACK SURGERY  10/12/2017    removal of agustin hardware T11- L3,4, spinal fusion L2-L5    KNEE SURGERY  5 years ago    right    TOTAL HIP ARTHROPLASTY Left 11/3/2020    LEFT ANTERIOR TOTAL HIP REPLACEMENT WITH C-ARM performed by Lesa Bruner MD at [place] [address]       Immunization History:   Immunization History   Administered Date(s) Administered    Influenza, MDCK Quadv, IM, PF (Flucelvax 4 yrs and older) 10/15/2017    Pneumococcal Conjugate 13-valent (Lxeizva36) 10/15/2017    Pneumococcal Polysaccharide (Apbuoosmo19) 10/05/2015    Tdap (Boostrix, Adacel) 2008       Active Problems:  Active Problems:    Arthritis of left hip  Resolved Problems:    * No resolved hospital problems. *      Isolation/Infection:       Nurse Assessment:  Last Vital Signs:/66   Pulse 88   Temp 98.8 °F (37.1 °C)   Resp 15   Ht 6' 2\" (1.88 m)   Wt 258 lb 13.1 oz (117.4 kg) Comment: Simultaneous filing. User may not have seen previous data.   SpO2 92%   BMI 33.23 kg/m²   Last documented pain score (0-10 scale): Pain Level: 7  Last Weight:   Wt Readings from Last 1 Encounters:   20 258 lb 13.1 oz (117.4 kg)     Mental Status:  oriented and alert     IV Access:  - None    Nursing Mobility/ADLs:  Walking   Assisted  Transfer  Assisted  Bathing  Assisted  Dressing  Assisted  [address] and transfer of Crystal Pastrana is necessary for the continuing treatment of the diagnosis listed and that he requires {Admit to Appropriate Level of Care:65014} for {GREATER/LESS:097901215} 30 days. Update Admission H&P: No change in H&P    PHYSICIAN SIGNATURE:  {Esignature:[id number]}    Followup Dr Raudel Jason in office 2 weeks after surgery   [facility] and Sports Medicine, [address] [place] [address], [address] [place] [address],   [phone]    CASE MANAGEMENT/SOCIAL WORK SECTION    Inpatient Status Date: ***    [facility]:    Discharging to Facility/ Agency   · Name:   · Address:  · Phone:  · Fax:    Dialysis Facility (if applicable)   · Name:  · Address:  · Dialysis Schedule:  · Phone:  · Fax:    / signature: {Esignature:[id number]}  Activity:   Elevate your leg if swelling occurs in your ankle. Use elastic wraps/hose until swelling decreases.  Continue the exercise program as prescribed by physical therapists.  Take frequent walks.  Use walker, crutches, or cane with weight bearing instructions as indicated by the physical therapists.  Take rest periods often. Elevate leg during rest period.  Avoid sitting in low chairs or toilets without raised seats.  Keep knees apart. Sleep with a pillow between your legs.  Do not cross your legs, especially when putting on shoes and socks. Wound Care:   Cover the wound with a sterile gauze dressing and change daily as long as there is drainage.  Do not scrub wound. Pat it dry with a soft towel.  Dont apply any lotions or creams to your wound.  Check the incision every day for redness, swelling, or increase in drainage. Diet:   You can resume your normal diet. There are no limits on your diet due to your surgery.  Pain pills and activity changes may lead to constipation. To prevent this, use prune juice or bran cereals liberally.   You may need to use a laxative such as Dulcolax, Senokot, or Milk of Magnesia.  Drink plenty of fluids. Medications:   Take pain pills if needed to maintain comfort.  Never drive while taking pain medicine.  Avoid over the counter medications until checking with your doctor.  Resume previous medications as instructed by your doctor. You will be on Aspirin or Eliquis  for 30 days only. Stay off other anti-inflammatory medications (except Celebrex) while on blood thinners  Call Your Doctor If:  Dayanarakeanu Almaguer You have increased pain not controlled by medications.  Excessive swelling in your ankle.  You develop numbness, tingling, or decreased movement.  You have a fever greater than 100 degrees for a day or over 101 degrees at any one time.  Your wound becomes more reddened, starts draining, or opens.  If you fall. You have any questions about your recovery. ? Inform your family doctor/dentist or any other doctor who cares for you in the future that you have a joint replacement. You may need antibiotics for dental or surgical procedures if there is any evidence of infection present. ?  If you have required the use of insulin to control your blood sugar after surgery, follow up with your family doctor. ? Call your surgeons office to schedule your appointment to be seen after surgery. ? Make your appointment to continue physical therapy per doctors orders. ? Smoking cessation assistance can be obtained from your family doctor or by [address] @ [phone]    _______________________________   _____   _______________________  ____                Patient Signature              Date      Witness                               Date          Anterior Approach  The main positions and movements to avoid after an anterior approach include bending the hip back, turning your hip and leg out, or spreading your leg outward.  Don't stretch your hip back. Walk with short steps.  Taking a longer step when leading with your nonoperated hip stretches the surgical hip back.  Don't kneel only on one knee. Kneeling only on the surgical hip stretches the hip back. Use both knees when you must kneel down.  Don't turn your foot out. Place a pillow next to your hip and leg to keep your leg from turning or rolling out while lying on your back in bed.  Don't twist your body away from your operated hip. This means don't stand with your toes pointed out. Keep the toes of your affected leg pointed forward when you stand, sit, or walk. If you turn your body away from your surgical hip without pivoting your foot, your hip will be placed in an unsafe position. Remember to lift and turn your foot as you turn.  Don't swing your leg outward away from your body. This means scooting to the side in bed by supporting your surgical leg.  Don't put your leg in a straddling position, as though you are mounting a horse. This means preventing your leg from bending up and out when getting in or out of the bathtub.  Instead, hold your leg, and lift it straight up and over the edge of the tub

## 2020-11-04 NOTE — PROGRESS NOTES
[facility] Orthopedic Surgery   Progress Note      S/P :  SUBJECTIVE  Up in chair. Alert and oriented. Pain is   described in left hip and with the intensity of moderate. Pain is described as aching. OBJECTIVE              Physical                      VITALS:  /66   Pulse 88   Temp 98.8 °F (37.1 °C)   Resp 15   Ht 6' 2\" (1.88 m)   Wt 258 lb 13.1 oz (117.4 kg) Comment: Simultaneous filing. User may not have seen previous data. SpO2 92%   BMI 33.23 kg/m²                     MUSCULOSKELETAL:  left foot NVI. Wiggles toes to command. Pedal pulses are palpable. NEUROLOGIC:                                  Sensory:  Touch:  Left Lower Extremity:  normal                                                 Surgical wound appears clean and dry left hip with Prineo dressing. Ice packs on. SUNI hose on.      Data       CBC:   Lab Results   Component Value Date    WBC 8.6 10/29/2020    RBC 4.93 10/29/2020    HGB 13.8 11/04/2020    HCT 40.9 11/04/2020    MCV 93.2 10/29/2020    MCH 31.9 10/29/2020    MCHC 34.2 10/29/2020    RDW 13.5 10/29/2020     10/29/2020    MPV 7.9 10/29/2020        WBC:    Lab Results   Component Value Date    WBC 8.6 10/29/2020        Hemoglobin/Hematocrit:    Lab Results   Component Value Date    HGB 13.8 11/04/2020    HCT 40.9 11/04/2020        PT/INR:    Lab Results   Component Value Date    PROTIME 11.1 10/29/2020    INR 0.96 10/29/2020              Current Inpatient Medications             Current Facility-Administered Medications: calcium carbonate (TUMS) chewable tablet 500 mg, 500 mg, Oral, TID PRN  rosuvastatin (CRESTOR) tablet 40 mg, 40 mg, Oral, Nightly  sodium chloride flush 0.9 % injection 10 mL, 10 mL, Intravenous, 2 times per day  sodium chloride flush 0.9 % injection 10 mL, 10 mL, Intravenous, PRN  acetaminophen (TYLENOL) tablet 650 mg, 650 mg, Oral, Q4H PRN  oxyCODONE (ROXICODONE) immediate release tablet 5 mg, 5 mg, Oral, Q4H PRN **OR** oxyCODONE HCl (OXY-IR) immediate release tablet 10 mg, 10 mg, Oral, Q4H PRN  morphine (PF) injection 2 mg, 2 mg, Intravenous, Q3H PRN  sennosides-docusate sodium (SENOKOT-S) 8.6-50 MG tablet 1 tablet, 1 tablet, Oral, BID  magnesium hydroxide (MILK OF MAGNESIA) 400 MG/5ML suspension 30 mL, 30 mL, Oral, Daily PRN  ondansetron (ZOFRAN) injection 4 mg, 4 mg, Intravenous, Q6H PRN  enoxaparin (LOVENOX) injection 30 mg, 30 mg, Subcutaneous, BID  insulin lispro (HUMALOG) injection vial 0-6 Units, 0-6 Units, Subcutaneous, TID WC  insulin lispro (HUMALOG) injection vial 0-3 Units, 0-3 Units, Subcutaneous, Nightly  glucose (GLUTOSE) 40 % oral gel 15 g, 15 g, Oral, PRN  dextrose 50 % IV solution, 12.5 g, Intravenous, PRN  glucagon (rDNA) injection 1 mg, 1 mg, Intramuscular, PRN  dextrose 5 % solution, 100 mL/hr, Intravenous, PRN  lisinopril (PRINIVIL;ZESTRIL) tablet 10 mg, 10 mg, Oral, QPM    ASSESSMENT AND PLAN      Post left KALA, stable exam  DVT prophylaxis ordered, Lovenox as inpt then switch to ASA 81mg twice at day for 30 days for DVT prophylaxis  Reviewed with patient importance of SUNI hose on daily/ off at  Night for 2 weeks as well and continue anticoagulant medication at home as ordered to reduce risk of postoperative DVT or PE clots. Pt verbalized understanding.   PT OT for ADL's and ambulation as tolerated, anterior hip precautions  SS for DC planning, home with home care today  IV or PO pain med as ordered    Cuca Manual  11/4/2020  9:08 AM

## 2020-11-04 NOTE — CARE COORDINATION
Referral per clinical path. Met with patient and confirmed plans to return to home with wife and [facility]. Verified demographics and that he has all necessary DME for home use. Referred to [facility] liaison for follow up. Patient denies any other needs or concerns.    Electronically signed by Emily Cottrell on 11/4/2020 at 12:05 PM

## 2020-11-04 NOTE — PROGRESS NOTES
CLINICAL PHARMACY NOTE: [facility] Select Patient?: No  Total # of Prescriptions Filled: 2   The following medications were delivered to the patient:  Discharge Medication List as of 11/4/2020  8:55 AM      START taking these medications    Details   aspirin EC 81 MG EC tablet Take 1 tablet by mouth 2 times daily Take for DVT blood clot prophylaxis. Please avoid missing doses. , Disp-60 tablet,R-0Print      oxyCODONE (ROXICODONE) 5 MG immediate release tablet Take 1-2 tablets by mouth every 6 hours as needed for Pain for up to 5 days. , Disp-40 tablet,R-0Print         ·   ·   Total # of Interventions Completed: 0  Time Spent (min): 30    Additional Documentation:

## 2020-11-04 NOTE — PROGRESS NOTES
Occupational Therapy  Facility/Department: [address] ORTHOPEDICS  Daily Treatment Note  NAME: Sayra Ayala  : 1950  MRN: [id number]    Date of Service: 2020    Discharge Recommendations:  24 hour supervision or assist, Home with Home health OT       Sayra Ayala scored a 21/24 on the AM-PAC ADL Inpatient form. Current research shows that an AM-PAC score of 18 or greater is typically associated with a discharge to the patient's home setting. Based on the patient's AM-PAC score, and their current ADL deficits, it is recommended that the patient have 2-3 sessions per week of Occupational Therapy at d/c to increase the patient's independence. At this time, this patient demonstrates the endurance and safety to discharge home with home (home vs OP services) and a follow up treatment frequency of 2-3x/wk. Please see assessment section for further patient specific details. If patient discharges prior to next session this note will serve as a discharge summary. Please see below for the latest assessment towards goals. Assessment: Discussed with OTR am pac score is 21. Anticipate that patient will safe to return home with family support and home OT. Patient able to complete functional mobility with RW with SBA, slow steady gait with no overt LOB noted. SBA for sit<>stand from recliner chair to RW to recliner chair. Able to dress lower body with SBA with AE except for shoes. Plan is for discharge to home today. Patient Diagnosis(es): Diagnoses of Arthritis of left hip and Hyperlipemia were pertinent to this visit. has a past medical history of HBP (high blood pressure), Hyperlipidemia, Insulin resistance, Lumbosacral radiculopathy at L5, MDRO (multiple drug resistant organisms) resistance, QI on CPAP, and Primary osteoarthritis of right knee. has a past surgical history that includes knee surgery (5 years ago);  Ankle surgery (30 years ago); back surgery (30 years ago); back surgery (10/12/2017); and Total hip arthroplasty (Left, 11/3/2020). Restrictions  Restrictions/Precautions  Restrictions/Precautions: Fall Risk  Lower Extremity Weight Bearing Restrictions  Left Lower Extremity Weight Bearing: Weight Bearing As Tolerated  Position Activity Restriction  Hip Precautions: No hip flexion > 90 degrees, No hip external rotation, No ADduction, No hip extension  Subjective   General  Chart Reviewed: Yes  Patient assessed for rehabilitation services?: Yes  Additional Pertinent Hx: Pt is 69 y.o. M who presents with arhtritis of L hip. Pt is s/p L KALA and is WBAT with anterior hip precautions. PMH: Lumbosacral radiculopathy at L5, MDRO  Response to previous treatment: Patient with no complaints from previous session  Family / Caregiver Present: Yes  Referring Practitioner: Sumanth Oliveros MD  Diagnosis: Arthritis of L hip  Subjective  Subjective: Patient seated in recliner chair upon arrival to room. Patient reports pain 5/10.  RN in room and aware      Orientation  Orientation  Overall Orientation Status: Within Functional Limits  Objective    ADL  UE Dressing: Independent  LE Dressing: Stand by assistance;Minimal assistance(able to thread clothing over feet and hips with AE with SBA, SBA for balance over hips, Min A for shoes)        Balance  Sitting Balance: Supervision  Standing Balance: Stand by assistance  Standing Balance  Activity: Static standing with RW  Functional Mobility  Functional - Mobility Device: Rolling Walker  Activity: To/from bathroom  Assist Level: Stand by assistance  Functional Mobility Comments: Functional mobility with RW with SBA, slow steady gait with no overt LOB noted  Toilet Transfers  Toilet - Technique: Ambulating  Equipment Used: Grab bars(comfort height commode)  Toilet Transfer: Stand by assistance  Toilet Transfers Comments: cues for hip precautions, suggest toielt safety frames for home, patient and wife verbalize understanding  Bed mobility  Supine to Sit: Unable to assess  Sit to Supine: Unable to assess  Comment: up in chair at start and end of session  Transfers  Sit to stand: Stand by assistance  Stand to sit: Stand by assistance  Transfer Comments: SBA for sit<>stand from recliner chair to RW to recliner chair     Cognition  Overall Cognitive Status: [facility]     Assessment   Performance deficits / Impairments: Decreased functional mobility ; Decreased balance;Decreased ADL status; Decreased endurance;Decreased strength  Assessment: Discussed with OTR am pac score is 21. Anticipate that patient will safe to return home with family support and home OT. Patient able to complete functional mobility with RW with SBA, slow steady gait with no overt LOB noted. SBA for sit<>stand from recliner chair to RW to recliner chair. Able to dress lower body with SBA with AE except for shoes. Plan is for discharge to home today. OT Education: OT Role;Plan of Care;Precautions; ADL Adaptive Strategies;Transfer Training  REQUIRES OT FOLLOW UP: Yes  Activity Tolerance  Activity Tolerance: Patient Tolerated treatment well  Safety Devices  Safety Devices in place: Yes  Type of devices: Left in chair;Call light within reach;Nurse notified        Plan   Plan  Times per week: Plan is for discharge to home today  Current Treatment Recommendations: Strengthening, Balance Training, Functional Mobility Training, Endurance Training, Patient/Caregiver Education & Training, Safety Education & Training    AM-PAC Score        AM-[facility] Inpatient Daily Activity Raw Score: 21 (11/04/20 1102)  AM-PAC Inpatient ADL T-Scale Score : 44.27 (11/04/20 1102)  ADL Inpatient CMS 0-100% Score: 32.79 (11/04/20 1102)  ADL Inpatient CMS G-Code Modifier : Willis Stearns (11/04/20 1102)    Goals  Short term goals  Time Frame for Short term goals: prior to d/c: all goals ongoing except where noted  Short term goal 1: Pt will complete functional mobility and transfers with SBA: goal met 11/4/2020  Short term goal 2: Pt will complete toileting with SBA  Short term goal 3: Pt will complete dressing with SBA  Short term goal 4: Pt will complete bathing with SBA  Patient Goals   Patient goals : \"to go home tomorrow\"       Therapy Time   Individual Concurrent Group Co-treatment   Time In 0930         Time Out 1025         Minutes 55                 Electronically signed by Sonya Tirado [id number] on 11/4/2020 at 11:12 AM

## 2020-11-04 NOTE — PLAN OF CARE
Problem: Cardiac:  Goal: Ability to maintain vital signs within normal range will improve  Description: Ability to maintain vital signs within normal range will improve  11/4/2020 1102 by Gi Forman RN  Outcome: Ongoing  Note: Pt's vital signs within normal limits. Electronically signed by Stella Phan RN on 11/4/2020 at 11:02 AM    11/4/2020 0746 by Thea Hi RN  Outcome: Ongoing  Note:   Vitals:    11/04/20 0501   BP: 119/76   Pulse: 80   Resp: 16   Temp: 98.9 °F (37.2 °C)   SpO2: 94%          Problem: Discharge Planning:  Goal: Knowledge of discharge instructions  Description: Knowledge of discharge instructions  11/4/2020 1102 by Stella Phan RN  Outcome: Ongoing  Note: Pt reviewed discharge instructions with orthopedic navigator, pt working with staff for appropriate level of care at discharge. Electronically signed by Stella Phan RN on 11/4/2020 at 11:03 AM    11/4/2020 0746 by Thea Hi RN  Outcome: Ongoing  Note: Pt planning to d/c home with wife. Problem: Infection - Surgical Site:  Goal: Signs of wound healing will improve  Description: Signs of wound healing will improve  11/4/2020 1102 by Gi Forman RN  Outcome: Ongoing  Note: No s/s of infection at surgical site, will continue to monitor and assess. Electronically signed by Stella Phan RN on 11/4/2020 at 11:03 AM    11/4/2020 0746 by Thea Hi RN  Outcome: Ongoing  Note: Surgical incisions with margin approximated. CDI prineo. Problem: Mobility - Impaired:  Goal: Achieve maximum mobility level  Description: Achieve maximum mobility level  11/4/2020 1102 by Stella Phan RN  Outcome: Ongoing  11/4/2020 0746 by Thea Hi RN  Outcome: Met This Shift  Note: Early and frequent ambulqtion encouraged. Educated patient on importance of early ambulation. Patient assisted with ambulation. Will continue to monitor.        Problem: Pain - Acute:  Goal: Pain level will decrease  Description: Pain level will decrease  11/4/2020 1102 by Fabiola Forman RN  Outcome: Ongoing  Note: Pt assessed for pain. Pt in pain and assessed with 0-10 pain rating scale. Pt given prescribed analgesic for pain. (See eMar) Pt satisfied with pain relief thus far. Will reassess and continue to monitor. Electronically signed by Carl Manzo RN on 11/4/2020 at 11:04 AM    11/4/2020 0746 by Hoda Cramer RN  Outcome: Ongoing  Note: Pain /discomfort being managed with PRN analgesics per MD orders, ice, rest, repositioning. Patient able to express presence and absence of pain and rate pain appropriately using numerical scale. Problem: Falls - Risk of:  Goal: Will remain free from falls  Description: Will remain free from falls  11/4/2020 1102 by Carl Manzo RN  Outcome: Ongoing  Note: Fall risk assessment completed. Fall precautions in place. Call light within reach. Pt educated on calling for assistance before getting up. Walkway free of clutter. Will continue to monitor. Electronically signed by Carl Manzo RN on 11/4/2020 at 11:04 AM    11/4/2020 0746 by Hoda Cramer RN  Outcome: Met This Shift  Note: Patient educated on fall prevention. Call light is within reach, bed locked in lowest position, personal items within reach, and bed alarm is on. Will round on patient per unit guidelines. Goal: Absence of physical injury  Description: Absence of physical injury  11/4/2020 1102 by Carl Manzo RN  Outcome: Ongoing  11/4/2020 0746 by Hoda Cramer RN  Outcome: Met This Shift  Note: Pt is free of injury. No injury noted. Fall precautions in place. Call light within reach. Will monitor. \       Problem: Pain:  Goal: Pain level will decrease  Description: Pain level will decrease  11/4/2020 1102 by Gi Forman RN  Outcome: Ongoing  Note: Pt assessed for pain. Pt in pain and assessed with 0-10 pain rating scale. Pt given prescribed analgesic for pain. (See eMar) Pt satisfied with pain relief thus far.  Will reassess and continue to monitor. Electronically signed by Carl Manzo RN on 11/4/2020 at 11:04 AM    11/4/2020 0746 by Hoda Cramer RN  Outcome: Ongoing  Note: Pain /discomfort being managed with PRN analgesics per MD orders, ice, rest, repositioning. Patient able to express presence and absence of pain and rate pain appropriately using numerical scale. Goal: Control of acute pain  Description: Control of acute pain  11/4/2020 1102 by Carl Manzo RN  Outcome: Ongoing  11/4/2020 0746 by Hoda Cramer RN  Outcome: Ongoing  Note: Pain /discomfort being managed with PRN analgesics per MD orders, ice, rest, repositioning. Patient able to express presence and absence of pain and rate pain appropriately using numerical scale. Goal: Control of chronic pain  Description: Control of chronic pain  11/4/2020 1102 by Carl Manzo RN  Outcome: Ongoing  11/4/2020 0746 by Hoda Cramer RN  Outcome: Met This Shift  Note: No C/O of chronic pain per this shift.

## 2020-11-04 NOTE — CARE COORDINATION
[facility]  Received referral from case management    Faxed orders to [address] care to see patient by   11/6/2020  Terrence Dahl LPN    [facility]N Cell [phone], Fax [phone]

## 2020-11-04 NOTE — PROGRESS NOTES
Physical Therapy    Facility/Department: [address] ORTHOPEDICS  Initial Assessment    NAME: Anjel Valencia  : 1950  MRN: [id number]    Date of Service: 2020    Assessment / Discharge Recommendations:  -progressing well and ready for home today  -he is following the hip precautions properly  -instructed in initial HEP and general activity to do until Home PT takes charge of care  -rolling walker issued to patient by Terri  -instructed in car transfers (his wife her to observe)    Activity Tolerance  Activity Tolerance: Patient Tolerated treatment well       Patient Diagnosis(es): Diagnoses of Arthritis of left hip and Hyperlipemia were pertinent to this visit. has a past medical history of HBP (high blood pressure), Hyperlipidemia, Insulin resistance, Lumbosacral radiculopathy at L5, MDRO (multiple drug resistant organisms) resistance, QI on CPAP, and Primary osteoarthritis of right knee. has a past surgical history that includes knee surgery (5 years ago); Ankle surgery (30 years ago); back surgery (30 years ago); back surgery (10/12/2017); and Total hip arthroplasty (Left, 11/3/2020). Restrictions  Restrictions/Precautions  Restrictions/Precautions: Fall Risk  Lower Extremity Weight Bearing Restrictions  Left Lower Extremity Weight Bearing: Weight Bearing As Tolerated  Position Activity Restriction  Hip Precautions: No hip flexion > 90 degrees, No hip external rotation, No ADduction, No hip extension  Vision/Hearing  Vision: Within Functional Limits     Subjective  General  Chart Reviewed: Yes  Additional Pertinent Hx: here for elective left THR    (PMH of spinal surgery)  Response To Previous Treatment: Patient with no complaints from previous session.   Family / Caregiver Present: Yes  Follows Commands: Within Functional Limits  Subjective  Subjective: to room following OT session - ready for PT session and to assess readiness for home today -reports pain is moderate and pain medications helpful  Social/Functional History  Social/Functional History  Lives With: Spouse  Type of Home: House  Home Layout: Two level, Laundry in basement, 1/2 bath on main level, Bed/Bath upstairs  Home Access: Stairs to enter with rails  Entrance Stairs - Number of Steps: 2  Bathroom Shower/Tub: Tub/Shower unit, Shower chair with back  [place] Toilet: Handicap height  Bathroom Equipment: Hand-held shower  Bathroom Accessibility: Walker accessible  Home Equipment: Rolling walker, Cane, Crutches  ADL Assistance: Independent  Homemaking Assistance: (Spouse completes)  Ambulation Assistance: Independent  Transfer Assistance: Independent  Active : Yes    Objective  Bed mobility  Supine to Sit: (not observed at this session)  Transfers  Sit to Stand: Supervision;Modified independent  Stand to sit: Supervision;Modified independent  Ambulation  Ambulation?: Yes  Ambulation 1  Surface: level tile  Device: Rolling Walker  Assistance: Supervision;Modified Independent  Quality of Gait: step to pattern with fair heel contact and fair to good weight bearing  - states has modest left foot dorsiflexion weakness and has to at times hike the left hip/trunk to advance the left LE  Distance: in room and ortho gym for >50 feet  Comments: stable on the walker  Stairs/Curb  Stairs?: (up-down 4 steps with rail at SBA - platform step with walker at light cga)  Exercises  Quad Sets: 10 per hour  Gluteal Sets: 10 per hour  Ankle Pumps: 30 per hour in easy pace  Comments: encouraged practice with the spirometer at home for a few more days     Plan   Plan  Times per week: 1-4 sessions  Current Treatment Recommendations: Transfer Training, ADL/Self-care Training, Patient/Caregiver Education & Training, Modalities, Gait Training, Stair training, Positioning  Safety Devices  Type of devices:  All fall risk precautions in place, Call light within reach, Chair alarm in place, Left in chair, Nurse notified    Goals  Short term goals  Time Frame for Short term goals: 1-2 days  Short term goal 1[facility] goal 2: transfers at supervision  Short term goal 3: ambulation at supervision wbat rolling walker for household distances    -steps as needed [facility] goal 4: exercises at supervision  Patient Goals   Patient goals : good function of his new hip and good walking       Therapy Time   Individual Concurrent Group Co-treatment   Time In 1030         Time Out [facility], PT

## 2020-11-04 NOTE — PLAN OF CARE
Problem: Cardiac:  Goal: Ability to maintain vital signs within normal range will improve  Description: Ability to maintain vital signs within normal range will improve  11/4/2020 0746 by Edwin Castellon RN  Outcome: Ongoing  Note:   Vitals:    11/04/20 0501   BP: 119/76   Pulse: 80   Resp: 16   Temp: 98.9 °F (37.2 °C)   SpO2: 94%       11/3/2020 1833 by George Jackson RN  Outcome: Ongoing  Note: VSS during this shift; will continue to monitor and assess     Problem: Discharge Planning:  Goal: Knowledge of discharge instructions  Description: Knowledge of discharge instructions  11/4/2020 0746 by Edwin Castellon RN  Outcome: Ongoing  Note: Pt planning to d/c home with wife. 11/3/2020 1833 by George Jackson RN  Outcome: Ongoing  Note: Patient anticipates d/c home tomorrow w/ wife and home care      Problem: Infection - Surgical Site:  Goal: Signs of wound healing will improve  Description: Signs of wound healing will improve  11/4/2020 0746 by Edwin Castellon RN  Outcome: Ongoing  Note: Surgical incisions with margin approximated. CDI prineo. 11/3/2020 1833 by George Jackson RN  Outcome: Ongoing     Problem: Mobility - Impaired:  Goal: Achieve maximum mobility level  Description: Achieve maximum mobility level  11/4/2020 0746 by Edwin Castellon RN  Outcome: Met This Shift  Note: Early and frequent ambulqtion encouraged. Educated patient on importance of early ambulation. Patient assisted with ambulation. Will continue to monitor. 11/3/2020 1833 by George Jackson RN  Outcome: Ongoing  Note: Patient up x1 w/ a walker; will continue to promote ambulation      Problem: Pain - Acute:  Goal: Pain level will decrease  Description: Pain level will decrease  11/4/2020 0746 by Edwin Castellon RN  Outcome: Ongoing  Note: Pain /discomfort being managed with PRN analgesics per MD orders, ice, rest, repositioning. Patient able to express presence and absence of pain and rate pain appropriately using numerical scale.      11/3/2020 9247 by Maryanne Jones RN  Outcome: Ongoing  Note: Pain managed with pharmacologic and non-pharmacologic interventions during this shift. Will continue to monitor and assess for needs and change in patient condition. Problem: Falls - Risk of:  Goal: Will remain free from falls  Description: Will remain free from falls  11/4/2020 0746 by Jose Daniel Gomez RN  Outcome: Met This Shift  Note: Patient educated on fall prevention. Call light is within reach, bed locked in lowest position, personal items within reach, and bed alarm is on. Will round on patient per unit guidelines. 11/3/2020 1833 by Maryanne Jones RN  Outcome: Ongoing  Note: Patient remains free from falls during this shift. Fall precautions remain in place. Patient educated on the need to implement call light use prior to ambulation. Will continue to monitor and assess. Goal: Absence of physical injury  Description: Absence of physical injury  11/4/2020 0746 by Jose Daniel Gomez RN  Outcome: Met This Shift  Note: Pt is free of injury. No injury noted. Fall precautions in place. Call light within reach. Will monitor. \    11/3/2020 1833 by Maryanne Jones RN  Outcome: Ongoing  Note: Patient remains free from physical harm during this shift. Will continue to monitor and assess patient. Problem: Pain:  Goal: Pain level will decrease  Description: Pain level will decrease  11/4/2020 0746 by Jose Daniel Gomez RN  Outcome: Ongoing  Note: Pain /discomfort being managed with PRN analgesics per MD orders, ice, rest, repositioning. Patient able to express presence and absence of pain and rate pain appropriately using numerical scale. 11/3/2020 1833 by Maryanne Jones RN  Outcome: Ongoing  Note: Pain managed with pharmacologic and non-pharmacologic interventions during this shift. Will continue to monitor and assess for needs and change in patient condition.      Goal: Control of acute pain  Description: Control of acute pain  11/4/2020 0746 by Jose Daniel Gomez RN  Outcome: Ongoing  Note: Pain /discomfort being managed with PRN analgesics per MD orders, ice, rest, repositioning. Patient able to express presence and absence of pain and rate pain appropriately using numerical scale. 11/3/2020 1833 by Vinny Gaspar RN  Outcome: Ongoing  Note: Pain managed with pharmacologic and non-pharmacologic interventions during this shift. Will continue to monitor and assess for needs and change in patient condition. Goal: Control of chronic pain  Description: Control of chronic pain  11/4/2020 0746 by Camron Anderson RN  Outcome: Met This Shift  Note: No C/O of chronic pain per this shift. 11/3/2020 1833 by Vinny Gaspar RN  Outcome: Ongoing  Note: Pain managed with pharmacologic and non-pharmacologic interventions during this shift. Will continue to monitor and assess for needs and change in patient condition.

## 2020-11-04 NOTE — PROGRESS NOTES
Pt AAO x4 per this shift. VSS. Pt tolerating Po fluids. Pt c/o of heart burn and requesting Tums. Dr. Robin Segundo notified. See eMAR. Pt c/o of pain to left hip. Managed with Prn medication, ice, rest, elevation. Pt up to ambulating in room per this shift. Tolerating Well. No further needs voiced per this shift. Fall precautions in place. Bed alarm on. Call light within reach. Will continue to round.  Electronically signed by Thea Hi RN on 11/4/2020 at 7:52 AM

## 2020-11-06 NOTE — ANESTHESIA POSTPROCEDURE EVALUATION
Department of Anesthesiology  Postprocedure Note    Patient: Ja Olson  MRN: [id number]  YOB: 1950  Date of evaluation: 11/6/2020  Time:  12:02 PM     Procedure Summary     Date:  11/03/20 Room / Location:  [facility] [address] [place] [address]    Anesthesia Start:  7879 Anesthesia Stop:  3089    Procedure:  LEFT ANTERIOR TOTAL HIP REPLACEMENT WITH C-ARM (Left ) Diagnosis:       Arthritis of left hip      (LEFT HIP ARTHRITIS)    Surgeon:  Destiny Rincon MD Responsible Provider:  Zahra Mcmahan MD    Anesthesia Type:  general ASA Status:  3          Anesthesia Type: general    Bucky Phase I: Bucky Score: 9    Bucky Phase II:      Last vitals: Reviewed and per EMR flowsheets.        Anesthesia Post Evaluation    Patient location during evaluation: PACU  Patient participation: complete - patient participated  Level of consciousness: awake and alert  Pain score: 4  Airway patency: patent  Nausea & Vomiting: no nausea and no vomiting  Complications: no  Cardiovascular status: blood pressure returned to baseline  Respiratory status: acceptable  Hydration status: euvolemic

## 2020-11-16 ENCOUNTER — OFFICE VISIT (OUTPATIENT)
Dept: ORTHOPEDIC SURGERY | Age: 70
End: 2020-11-16

## 2020-11-16 VITALS — WEIGHT: 258 LBS | HEIGHT: 74 IN | BODY MASS INDEX: 33.11 KG/M2 | TEMPERATURE: 97.2 F

## 2020-11-16 PROCEDURE — 99024 POSTOP FOLLOW-UP VISIT: CPT | Performed by: ORTHOPAEDIC SURGERY

## 2020-11-16 NOTE — PROGRESS NOTES
Patient is 67-year-old male status post left total hip arthroplasty performed 11/3/2020. Patient doing relatively well but now he states he cannot straighten out his leg. This is a patient who intraoperatively there was hardware failure of the trial liner and a C-clamp was lost within the wound. There was a significant effort to remove and identify the C-clamp however failed. The C-clamp was made out of surgical stainless steel and should not impact his overall outcome. Physical examination 67-year-old male oriented x3 temperature is 97.2. Examination of the left hip shows healed or healing left anterior hip wound with no signs of deep sepsis. Decreased range of motion of his hip with pain along the lateral aspect of his distal thigh and in the iliotibial band region. Distal neurovascular examination is intact to the foot and ankle. There is no signs of instability of the left hip. X-rays obtained AP pelvis AP lateral of the left hip show status post uncemented left total hip arthroplasty. Stable overall orientation and alignment with no signs of loosening subsidence or failure. The C-clamp she is visualized in the lateral x-ray infected almost directly posterior above the lesser troches. No other signs of instability subsidence fractures or dislocations. Contralateral right hip arthritis is also seen. Impression 67-year-old male stable status post left anterior total hip arthroplasty. Plan continue physical therapy follow-up in 4 to 6 weeks or as needed.

## 2020-11-16 NOTE — PROGRESS NOTES
This dictation was done with [employer] dictation and may contain mechanical errors related to translation. Temperature 97.2 °F (36.2 °C), temperature source Infrared, height 6' 2\" (1.88 m), weight 258 lb (117 kg).

## 2020-11-19 ENCOUNTER — TELEPHONE (OUTPATIENT)
Dept: ORTHOPEDICS UNIT | Age: 70
End: 2020-11-19

## 2020-11-19 NOTE — TELEPHONE ENCOUNTER
Spoke with patient regarding post discharge from hospital.    Incision status: No drainage, odor, or redness noted per patient    Edema/Swelling/Teds: edema noted, improving, wore denise as instructed    Pain level and status: 0/10  , some pain with ambulation    Use of pain medications: yes ; Patient stated they are taking their pain medication tylneol as prescribed. Use of ice therapy:      Blood thinner: aspirin ; Verified with patient that they are taking their anticoagulant as prescribed twice a day. Bowels: no constipaiton    Home Care Agency active: yes ; Claims just completed with therapist .  Outpatient therapy: starts Tuesday per patient    Do you have all of your medications: yes    Changes in medications: no    Ortho Vitals: n/a      No other questions/concerns at this time. Encouraged patient to call Orthopedic Nurse [phone] Kristyn Sims or Orthopedic office if has any questions/concerns.       Follow up appointments:    Future Appointments   Date Time Provider Abelardo Santiago   11/24/2020 11:45 AM PAM Tobias PT   12/3/2020  9:30 AM MD Jennifer Espinosa     Electronically signed by David Guerrero RN on 11/19/2020 at 2:00 PM

## 2020-11-24 ENCOUNTER — HOSPITAL ENCOUNTER (OUTPATIENT)
Dept: PHYSICAL THERAPY | Age: 70
Setting detail: THERAPIES SERIES
Discharge: HOME | End: 2020-11-24
Payer: MEDICARE

## 2020-11-24 ENCOUNTER — TELEPHONE (OUTPATIENT)
Dept: ORTHOPEDIC SURGERY | Age: 70
End: 2020-11-24

## 2020-11-24 PROCEDURE — 97110 THERAPEUTIC EXERCISES: CPT

## 2020-11-24 PROCEDURE — 97161 PT EVAL LOW COMPLEX 20 MIN: CPT

## 2020-11-24 NOTE — TELEPHONE ENCOUNTER
I called the patient and told him he may drive if he is not taking narcotics, has good control of his foot and ankle, and can get in and out of the car comfortably.

## 2020-11-25 NOTE — PROGRESS NOTES
Physical Therapy  Initial Assessment  Date: 2020  Patient Name: Tita Martin  MRN: [id number]  : 1950     Treatment Diagnosis: Gait dysfunction. Pain. Decreased left hip strength and AROM    Restrictions  Fall Risk- low    Subjective   General  Chart Reviewed: Yes  Referring Practitioner: Dr. Angela Wesley  Referral Date : 20  Diagnosis: I05.315 (ICD-10-CM) - History of left hip replacement  PT Visit Information  Onset Date: 20  PT Insurance Information: [employer]  Subjective  Subjective: Pt had left KALA on 11/3/20. States he is doing well, using RW. Pain is 0-4/10. Did home PT. Vision/Hearing  Vision  Vision: Within Functional Limits  Hearing  Hearing: Within functional limits    Orientation  Orientation  Overall Orientation Status: Within Normal Limits      Objective     Observation/Palpation  Observation: Pt ambulating with RW with normal gait (too low- PT adjusted height). Also ambulated 10 feet with no AD and slight limp  Scar: Incision healing well- all closed, no drainage    AROM RLE (degrees)  RLE AROM: WNL  PROM LLE (degrees)  LLE PROM: WNL  LLE General PROM: Except hip limited due to surgery  AROM LLE (degrees)  LLE AROM : WFL    Strength RLE  Strength RLE: WNL  Strength LLE  Strength LLE: WNL  Comment: Except  Hip Flexion 3/5, Abd 3+/5, Knee Ext 4-/5 and flex 4/5     Additional Measures  Flexibility: 90/90 HS: WNL  Sensation  Overall Sensation Status: WNL          Assessment   Conditions Requiring Skilled Therapeutic Intervention  Body structures, Functions, Activity limitations: Decreased functional mobility ; Decreased ADL status; Decreased ROM; Increased pain  Assessment: CLOF- s/p L KALA on 11/3/20- affecting gait and ability to do ADLs  Treatment Diagnosis: Gait dysfunction. Pain.  Decreased left hip strength and AROM  Prognosis: Excellent  Decision Making: Low Complexity  REQUIRES PT FOLLOW UP: Yes         Plan   Plan  Times per week: 2  Times per day: Daily  Plan weeks: 8  Current Treatment Recommendations: Strengthening, Home Exercise Program, ROM, Manual Therapy - Soft Tissue Mobilization, Manual Therapy - Joint Manipulation, Functional Mobility Training, Gait Training, Pain Management, Stair training     OutComes Score  LEFS Total Score: 27 (11/24/20 1946)                                             Goals  Short term goals  Time Frame for Short term goals: 3 Weeks  Short term goal 1: Pt will show independence in HEP  Short term goal 2: Pt will ambulate with normal gait with no AD  Long term goals  Time Frame for Long term goals : 6 Weeks  Long term goal 1: Pt will show 4/5 left hip strength so he can go up and down steps and walk community distances  Long term goal 2: Pt will be able to complete all ADLs  Patient Goals   Patient goals :  \"Get rid of walker and stand upright\"       Therapy Time   Individual Concurrent Group Co-treatment   Time In           Time Out           Minutes                   Eduin knight, PT

## 2020-11-25 NOTE — FLOWSHEET NOTE
Physical Therapy Daily Treatment Note  Date:  2020    Patient Name:  Mala Alvarez    :  1950  MRN: [id number]    Restrictions/Precautions:  Left KALA on 11/3/20  Pertinent Medical History: HTN, Lumbar fusion 2017, left ankle sx  Medical/Treatment Diagnosis Information:  · Diagnosis: Z96.642 (ICD-10-CM) - History of left hip replacement  · Treatment Diagnosis: Gait dysfunction. Pain. Decreased left hip strength and AROM    Insurance/Certification information:  PT Insurance Information: [employer]  Physician Information:  Referring Practitioner: Dr. Beckford Estimable of care signed (Y/N):      Visit# / total visits:    Pain level: /10     Functional Outcomes Measure:  Test: LEFS  Score: 27 (CL)    Progress Note: []  Yes  []  No  Next due by: Visit #10      History of Injury:   Pt had left KALA on 11/3/20. States he is doing well, using RW. Pain is 0-4/10. Did home PT.       Subjective:       Objective:   Observation:    Test measurements:      Exercises:  Exercise/Equipment Resistance/Repetitions Other comments   Nu step     QS      Heel Slide     SLR Assist when ready    LAQ     Hip Abd isometric     Leg Press     HEP     Reviewed current and added LAQ and hip abd isometric      Gastroc stretch                           Other Therapeutic Activities:    Discussed purpose, risks and benefits of PT with pt who is agreeable to POC. Home Exercise Program:    Instructed patient on an HEP. Patient demonstrated exercises correctly. Handout with pictures and # of reps/sets was given. Exercises are listed above. Manual Treatments: Add STM to left hip and PROM    Modalities:     Progression Towards Functional goals:  [] Patient is progressing as expected towards functional goals listed. [] Progression is slowed due to complexities listed. [] Progression has been slowed due to co-morbidities.   [x] Plan just implemented, too soon to assess goals progression  [] Other:    Charges: Therapeutic Exercise:  [x] (53883) Provided verbal/tactile cueing for activities to restore or maintain strength, flexibility, endurance, ROM for improvements with self-care, mobility, lifting and ambulation. Neuromuscular Re-Education  [] (00040) Provided verbal/tactile cueing for activities to restore or maintain balance, coordination, kinesthetic sense, posture, motor skill, proprioception for self-care, mobility, lifting, and ambulation. Therapeutic Activities:    [x] (07726) Provided verbal/tactile cueing to address functional limitations related to loss of mobility, strength, balance, and coordination. Gait Training:  [x] (40187) Provided training and instruction to the patient for proper postural muscle recruitment and positioning with ambulation re-education     Home Exercise Program:    [x] (47033) Reviewed/Progressed HEP activities related to strengthening, flexibility, endurance, ROM for functional self-care, mobility, lifting and ambulation   [] (87249) Reviewed/Progressed HEP activities related to improving balance, coordination, kinesthetic sense, posture, motor skill, proprioception for self-care, mobility, lifting, and ambulation      Manual Treatments:  MFR / STM / Oscillations-Mobs:  G-I, II, III, IV / Manipulation / MLD  [x] (10490) Provided manual therapy to mobilize  soft tissue/joints/fluid for the purpose of modulating pain, promoting relaxation, increasing ROM, reducing/eliminating soft tissue swelling/inflammation/restriction, improving soft tissue extensibility and allowing for proper ROM for normal function with self- care, mobility, lifting and ambulation.         Timed Code Treatment Minutes: 20   Total Treatment Minutes: 36     [x] EVAL (LOW) 39360   [] EVAL (MOD) 53505   [] EVAL (HIGH) 46319   [] RE-EVAL   [x] TE (76892) x     [] Aquatic (61766) x  [] NMR (78489)   x  [] Aquatic Group (18696) x  [] Manual (57465) x    [] Ultrasound (91524) x  [] TA

## 2020-12-01 ENCOUNTER — HOSPITAL ENCOUNTER (OUTPATIENT)
Dept: PHYSICAL THERAPY | Age: 70
Setting detail: THERAPIES SERIES
Discharge: HOME | End: 2020-12-01
Payer: MEDICARE

## 2020-12-01 PROCEDURE — 97140 MANUAL THERAPY 1/> REGIONS: CPT

## 2020-12-01 PROCEDURE — 97110 THERAPEUTIC EXERCISES: CPT

## 2020-12-01 NOTE — FLOWSHEET NOTE
Physical Therapy Daily Treatment Note  Date:  2020    Patient Name:  Crystal Pastrana    :  1950  MRN: [id number]    Restrictions/Precautions:  Left KALA on 11/3/20  Pertinent Medical History: HTN, Lumbar fusion 2017, left ankle sx  Medical/Treatment Diagnosis Information:  · Diagnosis: Z96.642 (ICD-10-CM) - History of left hip replacement  · Treatment Diagnosis: Gait dysfunction. Pain. Decreased left hip strength and AROM    Insurance/Certification information:  PT Insurance Information: [employer]  Physician Information:  Referring Practitioner: Dr. Moreno Gilman of care signed (Y/N):      Visit# / total visits:    Pain level: 4/10     Functional Outcomes Measure:  Test: LEFS  Score: 27 (CL)    Progress Note: []  Yes  []  No  Next due by: Visit #10      History of Injury:   Pt had left K[facility]A on 11/3/20. States he is doing well, using RW. Pain is 0-4/10. Did home PT.  20: Patient reports hip is doing well,just a little achy. States he has been walking with a cane.       Subjective:       Objective:   Observation:    Test measurements:      Exercises:  Exercise/Equipment Resistance/Repetitions Other comments   Nu step L3 x 5 min    QS  5 sec x 15     Heel Slide 20 x     SLR Assist when ready    LAQ 2.5# 2 x 20    Hip Abd isometric 5 sec x 15     Leg Press 60 # 2 x 20    HEP     Reviewed current and added LAQ and hip abd isometric      Gastroc stretch                           Other Therapeutic Activities:    Discussed purpose, risks and benefits of PT with pt who is agreeable to POC. Home Exercise Program:    Instructed patient on an HEP. Patient demonstrated exercises correctly. Handout with pictures and # of reps/sets was given. Exercises are listed above. Manual Treatments:    STM to left hip and PROM x 15 min    Modalities:     Progression Towards Functional goals:  [] Patient is progressing as expected towards functional goals listed.     [] Progression is slowed due to complexities listed. [] Progression has been slowed due to co-morbidities. [x] Plan just implemented, too soon to assess goals progression  [] Other:    Charges: Therapeutic Exercise:  [x] (91981) Provided verbal/tactile cueing for activities to restore or maintain strength, flexibility, endurance, ROM for improvements with self-care, mobility, lifting and ambulation. Neuromuscular Re-Education  [] (47398) Provided verbal/tactile cueing for activities to restore or maintain balance, coordination, kinesthetic sense, posture, motor skill, proprioception for self-care, mobility, lifting, and ambulation. Therapeutic Activities:    [x] (65705) Provided verbal/tactile cueing to address functional limitations related to loss of mobility, strength, balance, and coordination. Gait Training:  [x] (10197) Provided training and instruction to the patient for proper postural muscle recruitment and positioning with ambulation re-education     Home Exercise Program:    [x] (61551) Reviewed/Progressed HEP activities related to strengthening, flexibility, endurance, ROM for functional self-care, mobility, lifting and ambulation   [] (77533) Reviewed/Progressed HEP activities related to improving balance, coordination, kinesthetic sense, posture, motor skill, proprioception for self-care, mobility, lifting, and ambulation      Manual Treatments:  MFR / STM / Oscillations-Mobs:  G-I, II, III, IV / Manipulation / MLD  [x] (10892) Provided manual therapy to mobilize  soft tissue/joints/fluid for the purpose of modulating pain, promoting relaxation, increasing ROM, reducing/eliminating soft tissue swelling/inflammation/restriction, improving soft tissue extensibility and allowing for proper ROM for normal function with self- care, mobility, lifting and ambulation.         Timed Code Treatment Minutes: 40   Total Treatment Minutes: 45     [] EVAL (LOW) 04240   [] EVAL (MOD) 81658   [] EVAL (HIGH) 16164   [] RE-EVAL   [x] TE [phone]) x 2    [] Aquatic (95996) x  [] NMR (58557)   x  [] Aquatic Group (35056) x  [x] Manual (28009) x 1   [] Ultrasound (68459) x  [] TA (14633) x  [] Mech Traction (65447)  [] Ionto (00866)           [] ES (un) (49985):   [] Vasopump (64719) [] Other:      Assessment  [x] Patient tolerated treatment well [] Patient limited by fatigue  [] Patient limited by pain  [] Patient limited by other medical complications  [] Other:     Prognosis: [x] Good [] Fair  [] Poor    Goals:    Short term goals  Time Frame for Short term goals: 3 Weeks  Short term goal 1: Pt will show independence in HEP  Short term goal 2: Pt will ambulate with normal gait with no AD      Long term goals  Time Frame for Long term goals : 6 Weeks  Long term goal 1: Pt will show 4/5 left hip strength so he can go up and down steps and walk community distances  Long term goal 2: Pt will be able to complete all ADLs     Patient Requires Follow-up: [x] Yes  [] No    Plan:   [] Continue per plan of care [] Alter current plan (see comments)  [x] Plan of care initiated [] Hold pending MD visit [] Discharge  Note: If patient does not return for scheduled/ recommended follow up visits, this note will serve as a discharge from care along with most recent update on progress.     Plan for Next Session:   Progress strengthening- most weakness in hip flexion  Progress ROM  Gait training     Electronically signed by:  Jemal Silva PTA

## 2020-12-03 ENCOUNTER — OFFICE VISIT (OUTPATIENT)
Dept: ORTHOPEDIC SURGERY | Age: 70
End: 2020-12-03

## 2020-12-03 VITALS — WEIGHT: 258 LBS | TEMPERATURE: 97.1 F | HEIGHT: 74 IN | BODY MASS INDEX: 33.11 KG/M2

## 2020-12-03 PROCEDURE — 99024 POSTOP FOLLOW-UP VISIT: CPT | Performed by: ORTHOPAEDIC SURGERY

## 2020-12-03 NOTE — PROGRESS NOTES
[facility]  Office Visit    Chief Complaint: Follow-up s/p left total hip arthroplasty    HPI:  Brayan Rodriguez is a 69 y.o. who is here in follow-up of left total hip arthroplasty performed on November 3, 2020. He is doing well. He continues work with physical therapy. He is not taking any medication for pain. He does have some pain around the time of physical therapy. This pain is anterior in his left groin. He uses a cane for ambulation. He denies any numbness or tingling in the leg. Patient Active Problem List   Diagnosis    Elbow pain    Hyperlipidemia    HTN (hypertension)    Vitamin D deficiency    MTHFR mutation (HCC)    Insulin resistance    Lumbosacral radiculopathy at L5    Pain of left hip joint    Arthritis of left hip    Spinal stenosis of lumbar region    Postlaminectomy syndrome    Status post lumbar spinal fusion    10/12/17 Removal fixation T11-L3 ; laminectomy L3-4 L4-L5; posterior spinal fusion L2-L3, L3-L4     Arthritis of left shoulder region    Primary osteoarthritis of right knee       ROS:  Constitutional: denies fever, chills, weight loss  MSK: denies pain in other joints, muscle aches  Neurological: denies numbness, tingling, weakness    Medications:  Current Outpatient Medications on File Prior to Visit   Medication Sig Dispense Refill    fosinopril (MONOPRIL) 10 MG tablet Take 20 mg by mouth nightly      Cholecalciferol (VITAMIN D3) 50 MCG (2000 UT) CAPS Take by mouth daily      aspirin EC 81 MG EC tablet Take 1 tablet by mouth 2 times daily Take for DVT blood clot prophylaxis. Please avoid missing doses. 60 tablet 0    metFORMIN (GLUCOPHAGE) 500 MG tablet Take 1 tablet by mouth 2 times daily (with meals) 60 tablet 1    CRESTOR 40 MG tablet TAKE ONE TABLET BY MOUTH EVERY EVENING 90 tablet 2    Thiamine HCl (VITAMIN B-1 PO) Take  by mouth daily. No current facility-administered medications on file prior to visit. Exam:  Temperature 97.1 °F (36.2 °C), temperature source Infrared, height 6' 2\" (1.88 m), weight 258 lb (117 kg). Appearance: sitting in exam room chair, appears to be in no acute distress, awake and alert  Resp: unlabored breathing on room air  Skin: warm, dry and intact with out erythema or significant increased temperature  Neuro: grossly intact both lower extremities. Intact sensation to light touch. Motor exam 4+ to 5/5 in all major motor groups. MSK: LLE -incision is healing as expected with no erythema, fluctuance, drainage. Examination demonstrates negative logroll, mildly positive Stinchfield. There is brisk capillary refill. There are 2+ dorsalis pedis and posterior tibial pulses. Strength is 5/5 in hamstrings, quads, hip flexors. Imaging:  None    Assessment:  S/p left total hip arthroplasty    Plan:  He is doing well after left total hip arthroplasty. He will continue working with physical therapy. We will see him back in 4 to 6 weeks for repeat exam and x-ray or sooner if needed. This dictation was done with [place] dictation and may contain mechanical errors related to translation.

## 2020-12-04 ENCOUNTER — HOSPITAL ENCOUNTER (OUTPATIENT)
Dept: PHYSICAL THERAPY | Age: 70
Setting detail: THERAPIES SERIES
Discharge: HOME | End: 2020-12-04
Payer: MEDICARE

## 2020-12-04 PROCEDURE — 97140 MANUAL THERAPY 1/> REGIONS: CPT

## 2020-12-04 PROCEDURE — 97110 THERAPEUTIC EXERCISES: CPT

## 2020-12-04 NOTE — FLOWSHEET NOTE
Physical Therapy Daily Treatment Note  Date:  2020    Patient Name:  Tonia James    :  1950  MRN: [id number]    Restrictions/Precautions:  Left KALA on 11/3/20  Pertinent Medical History: HTN, Lumbar fusion 2017, left ankle sx  Medical/Treatment Diagnosis Information:  · Diagnosis: Z96.642 (ICD-10-CM) - History of left hip replacement  · Treatment Diagnosis: Gait dysfunction. Pain. Decreased left hip strength and AROM  Insurance/Certification information:  PT Insurance Information: [employer]  Physician Information:  Referring Practitioner: Dr. Blanca Cortés of care signed (Y/N):    Visit# / total visits:  3/16  Pain level: 4/10     Functional Outcomes Measure:  Test: LEFS  Score: 27 (CL)    Progress Note: []  Yes  []  No  Next due by: Visit #10      History of Injury:   Pt had left KALA on 11/3/20. States he is doing well, using RW. Pain is 0-4/10. Did home PT. Subjective:     20: Patient reports hip is doing well,just a little achy. States he has been walking with a cane. 20: States he is doing better day by day. Goal is still to stand up straighter. States muscle on front of left hip is a little sore after MD assessment    Objective:   Observation:    Test measurements:     20: Assessed leg length difference: Right is 2 cm longer    Exercises:  Exercise/Equipment Resistance/Repetitions Other comments   Nu step L3 x 5 min    QS  5 sec x 15     Heel Slide 20 x     SLR Assist when ready    LAQ 3# 2 x 20    Hip Abd isometric 5 sec x 15     Leg Press 75 # 2 x 20    Step 6 inch  Fwd 2 x 10 Left         HEP     Reviewed current and added LAQ and hip abd isometric      Gastroc stretch                           Other Therapeutic Activities:   20: Adjusted height of cane. Assessed gait with shoe off- pt to add insert to the R and take on out of L. May bring in different shoes as well.     Home Exercise Program:      Manual Treatments:    STM to left hip and PROM   Attempted left RF stretch in supine but knee pain limited    Modalities:     Progression Towards Functional goals:  [x] Patient is progressing as expected towards functional goals listed. [] Progression is slowed due to complexities listed. [] Progression has been slowed due to co-morbidities. [] Plan just implemented, too soon to assess goals progression  [] Other:    Charges: Therapeutic Exercise:  [x] (22213) Provided verbal/tactile cueing for activities to restore or maintain strength, flexibility, endurance, ROM for improvements with self-care, mobility, lifting and ambulation. Neuromuscular Re-Education  [] (59901) Provided verbal/tactile cueing for activities to restore or maintain balance, coordination, kinesthetic sense, posture, motor skill, proprioception for self-care, mobility, lifting, and ambulation. Therapeutic Activities:    [x] (22774) Provided verbal/tactile cueing to address functional limitations related to loss of mobility, strength, balance, and coordination.      Gait Training:  [x] (49321) Provided training and instruction to the patient for proper postural muscle recruitment and positioning with ambulation re-education     Home Exercise Program:    [x] (70002) Reviewed/Progressed HEP activities related to strengthening, flexibility, endurance, ROM for functional self-care, mobility, lifting and ambulation   [] (84008) Reviewed/Progressed HEP activities related to improving balance, coordination, kinesthetic sense, posture, motor skill, proprioception for self-care, mobility, lifting, and ambulation      Manual Treatments:  MFR / STM / Oscillations-Mobs:  G-I, II, III, IV / Manipulation / MLD  [x] (73717) Provided manual therapy to mobilize  soft tissue/joints/fluid for the purpose of modulating pain, promoting relaxation, increasing ROM, reducing/eliminating soft tissue swelling/inflammation/restriction, improving soft tissue extensibility and allowing for proper ROM for normal function with self- care, mobility, lifting and ambulation. Timed Code Treatment Minutes: 46   Total Treatment Minutes: 55     [] EVAL (LOW) 42872   [] EVAL (MOD) 40358   [] EVAL (HIGH) 75306   [] RE-EVAL   [x] TE (39536) x 2    [] Aquatic (19502) x  [] NMR (90265)   x  [] Aquatic Group (20717) x  [x] Manual (21782) x 1   [] Ultrasound (47660) x  [] TA (45575) x  [] Mech Traction (47218)  [] Ionto (99593)           [] ES (un) (38719):   [] Vasopump (19544) [] Other:      Assessment  [x] Patient tolerated treatment well [] Patient limited by fatigue  [] Patient limited by pain  [] Patient limited by other medical complications  [] Other:     Prognosis: [x] Good [] Fair  [] Poor    Goals:    Short term goals  Time Frame for Short term goals: 3 Weeks  Short term goal 1: Pt will show independence in HEP  Short term goal 2: Pt will ambulate with normal gait with no AD      Long term goals  Time Frame for Long term goals : 6 Weeks  Long term goal 1: Pt will show 4/5 left hip strength so he can go up and down steps and walk community distances  Long term goal 2: Pt will be able to complete all ADLs     Patient Requires Follow-up: [x] Yes  [] No    Plan:   [] Continue per plan of care [] Alter current plan (see comments)  [x] Plan of care initiated [] Hold pending MD visit [] Discharge  Note: If patient does not return for scheduled/ recommended follow up visits, this note will serve as a discharge from care along with most recent update on progress.     Plan for Next Session:   Progress strengthening- most weakness in hip flexion  Progress ROM  Gait training     Electronically signed by:  Светлана Mcgowan PT

## 2020-12-07 ENCOUNTER — HOSPITAL ENCOUNTER (OUTPATIENT)
Dept: PHYSICAL THERAPY | Age: 70
Setting detail: THERAPIES SERIES
Discharge: HOME | End: 2020-12-07
Payer: MEDICARE

## 2020-12-07 PROCEDURE — 97140 MANUAL THERAPY 1/> REGIONS: CPT

## 2020-12-07 PROCEDURE — 97110 THERAPEUTIC EXERCISES: CPT

## 2020-12-07 NOTE — FLOWSHEET NOTE
Physical Therapy Daily Treatment Note  Date:  2020    Patient Name:  Tho Yang    :  1950  MRN: [id number]    Restrictions/Precautions:  Left KALA on 11/3/20  Pertinent Medical History: HTN, Lumbar fusion 2017, left ankle sx  Medical/Treatment Diagnosis Information:  · Diagnosis: Z96.642 (ICD-10-CM) - History of left hip replacement  · Treatment Diagnosis: Gait dysfunction. Pain. Decreased left hip strength and AROM  Insurance/Certification information:  PT Insurance Information: [employer]  Physician Information:  Referring Practitioner: Dr. Caretha Goldmann of care signed (Y/N):    Visit# / total visits:    Pain level: 3/10     Functional Outcomes Measure:  Test: LEFS  Score: 27 (CL)    Progress Note: []  Yes  []  No  Next due by: Visit #10      History of Injury:   Pt had left KALA on 11/3/20. States he is doing well, using RW. Pain is 0-4/10. Did home PT. Subjective:     20: Patient reports hip is doing well,just a little achy. States he has been walking with a cane. 20: States he is doing better day by day. Goal is still to stand up straighter. States muscle on front of left hip is a little sore after MD assessment  20: Doing better everyday. Objective:   Observation:    Test measurements:     20: Assessed leg length difference: Right is 2 cm longer    Exercises:  Exercise/Equipment Resistance/Repetitions Other comments   Nu step L3 x 5 min         Heel Slide 20 x 2    SLR Assist when ready    LAQ 4# 2 x 20 left    Clamshell 2 x 20    Leg Press 75 # 2 x 30    Step 6 inch  Fwd 2 x 10 Left  Lat 2 x 10 Left    Airex Mini Squat x 20     SLS 3 x 30\" left    Wobble X 2 min    HEP     Reviewed current and added LAQ and hip abd isometric      Gastroc stretch                           Other Therapeutic Activities:   20: Adjusted height of cane. Assessed gait with shoe off- pt to add insert to the R and take on out of L.  May bring in different shoes as well.    Home Exercise Program:      Manual Treatments:    STM to left hip and PROM   Attempted left RF stretch in supine but knee pain limited    Modalities:     Progression Towards Functional goals:  [x] Patient is progressing as expected towards functional goals listed. [] Progression is slowed due to complexities listed. [] Progression has been slowed due to co-morbidities. [] Plan just implemented, too soon to assess goals progression  [] Other:    Charges: Therapeutic Exercise:  [x] (19421) Provided verbal/tactile cueing for activities to restore or maintain strength, flexibility, endurance, ROM for improvements with self-care, mobility, lifting and ambulation. Neuromuscular Re-Education  [] (56409) Provided verbal/tactile cueing for activities to restore or maintain balance, coordination, kinesthetic sense, posture, motor skill, proprioception for self-care, mobility, lifting, and ambulation. Therapeutic Activities:    [x] (86956) Provided verbal/tactile cueing to address functional limitations related to loss of mobility, strength, balance, and coordination.      Gait Training:  [x] (46492) Provided training and instruction to the patient for proper postural muscle recruitment and positioning with ambulation re-education     Home Exercise Program:    [x] (99213) Reviewed/Progressed HEP activities related to strengthening, flexibility, endurance, ROM for functional self-care, mobility, lifting and ambulation   [] (24013) Reviewed/Progressed HEP activities related to improving balance, coordination, kinesthetic sense, posture, motor skill, proprioception for self-care, mobility, lifting, and ambulation      Manual Treatments:  MFR / STM / Oscillations-Mobs:  G-I, II, III, IV / Manipulation / MLD  [x] (99467) Provided manual therapy to mobilize  soft tissue/joints/fluid for the purpose of modulating pain, promoting relaxation, increasing ROM, reducing/eliminating soft tissue swelling/inflammation/restriction, improving soft tissue extensibility and allowing for proper ROM for normal function with self- care, mobility, lifting and ambulation. Timed Code Treatment Minutes: 44   Total Treatment Minutes: 44     [] EVAL (LOW) 04049   [] EVAL (MOD) 42922   [] EVAL (HIGH) 06317   [] RE-EVAL   [x] TE (69154) x 2    [] Aquatic (53838) x  [] NMR (92628)   x  [] Aquatic Group (45089) x  [x] Manual (88426) x 1   [] Ultrasound (14448) x  [] TA (34432) x  [] Mech Traction (82394)  [] Ionto (03113)           [] ES (un) (11779):   [] Vasopump (64279) [] Other:      Assessment  [x] Patient tolerated treatment well [] Patient limited by fatigue  [] Patient limited by pain  [] Patient limited by other medical complications  [] Other:     Prognosis: [x] Good [] Fair  [] Poor    Goals:    Short term goals  Time Frame for Short term goals: 3 Weeks  Short term goal 1: Pt will show independence in HEP  Short term goal 2: Pt will ambulate with normal gait with no AD      Long term goals  Time Frame for Long term goals : 6 Weeks  Long term goal 1: Pt will show 4/5 left hip strength so he can go up and down steps and walk community distances  Long term goal 2: Pt will be able to complete all ADLs     Patient Requires Follow-up: [x] Yes  [] No    Plan:   [] Continue per plan of care [] Alter current plan (see comments)  [x] Plan of care initiated [] Hold pending MD visit [] Discharge  Note: If patient does not return for scheduled/ recommended follow up visits, this note will serve as a discharge from care along with most recent update on progress.     Plan for Next Session:   Progress strengthening- most weakness in hip flexion  Progress ROM  Gait training     Electronically signed by:  Phil Pennington PT

## 2020-12-09 ENCOUNTER — HOSPITAL ENCOUNTER (OUTPATIENT)
Dept: PHYSICAL THERAPY | Age: 70
Setting detail: THERAPIES SERIES
Discharge: HOME | End: 2020-12-09
Payer: MEDICARE

## 2020-12-09 PROCEDURE — 97110 THERAPEUTIC EXERCISES: CPT

## 2020-12-09 NOTE — FLOWSHEET NOTE
Physical Therapy Daily Treatment Note  Date:  2020    Patient Name:  Sotero Pérez    :  1950  MRN: [id number]    Restrictions/Precautions:  Left KALA on 11/3/20  Pertinent Medical History: HTN, Lumbar fusion 2017, left ankle sx  Medical/Treatment Diagnosis Information:  · Diagnosis: Z96.642 (ICD-10-CM) - History of left hip replacement  · Treatment Diagnosis: Gait dysfunction. Pain. Decreased left hip strength and AROM  Insurance/Certification information:  PT Insurance Information: [employer]  Physician Information:  Referring Practitioner: Dr. Bridget Razo of care signed (Y/N):    Visit# / total visits:    Pain level: 3/10     Functional Outcomes Measure:  Test: LEFS  Score: 27 (CL)    Progress Note: []  Yes  []  No  Next due by: Visit #10      History of Injury:   Pt had left KALA on 11/3/20. States he is doing well, using RW. Pain is 0-4/10. Did home PT. Subjective:     20: Patient reports hip is doing well,just a little achy. States he has been walking with a cane. 20: States he is doing better day by day. Goal is still to stand up straighter. States muscle on front of left hip is a little sore after MD assessment  20: Doing better everyday. 20: Pt states he is doing pretty good, little sore from last visit.  Balance exercises were tough    Objective:   Observation:    20: Pt ambulating with mostly normal gait   Test measurements:     20: Assessed leg length difference: Right is 2 cm longer    Exercises:  Exercise/Equipment Resistance/Repetitions Other comments   Nu step L3 x 5 min         Heel Slide 20 x 2    SLR 2 x 5 with Assist     LAQ 4# 2 x 20 left    Clamshell 2 x 20    Leg Press 85 # 2 x 30    Step 6 inch  Fwd 2 x 10 Left  Lat 2 x 10 Left    Airex Mini Squat x 20     SLS 3 x 30\" left    Wobble     Mini Squat  x 10 for 10\"    HEP     Reviewed current and added LAQ and hip abd isometric      Gastroc stretch                           Other Therapeutic Activities:   12/4/20: Adjusted height of cane. Assessed gait with shoe off- pt to add insert to the R and take on out of L. May bring in different shoes as well. 12/9/20: Gait training around clinic without AD    Home Exercise Program:      Manual Treatments:        Modalities:     Progression Towards Functional goals:  [x] Patient is progressing as expected towards functional goals listed. [] Progression is slowed due to complexities listed. [] Progression has been slowed due to co-morbidities. [] Plan just implemented, too soon to assess goals progression  [] Other:    Charges: Therapeutic Exercise:  [x] (49326) Provided verbal/tactile cueing for activities to restore or maintain strength, flexibility, endurance, ROM for improvements with self-care, mobility, lifting and ambulation. Neuromuscular Re-Education  [] (39692) Provided verbal/tactile cueing for activities to restore or maintain balance, coordination, kinesthetic sense, posture, motor skill, proprioception for self-care, mobility, lifting, and ambulation. Therapeutic Activities:    [x] (55665) Provided verbal/tactile cueing to address functional limitations related to loss of mobility, strength, balance, and coordination.      Gait Training:  [x] (85573) Provided training and instruction to the patient for proper postural muscle recruitment and positioning with ambulation re-education     Home Exercise Program:    [x] (78766) Reviewed/Progressed HEP activities related to strengthening, flexibility, endurance, ROM for functional self-care, mobility, lifting and ambulation   [] (15248) Reviewed/Progressed HEP activities related to improving balance, coordination, kinesthetic sense, posture, motor skill, proprioception for self-care, mobility, lifting, and ambulation      Manual Treatments:  MFR / STM / Oscillations-Mobs:  G-I, II, III, IV / Manipulation / MLD  [x] (57259) Provided manual therapy to mobilize  soft tissue/joints/fluid for the purpose of modulating pain, promoting relaxation, increasing ROM, reducing/eliminating soft tissue swelling/inflammation/restriction, improving soft tissue extensibility and allowing for proper ROM for normal function with self- care, mobility, lifting and ambulation. Timed Code Treatment Minutes: 45   Total Treatment Minutes: 45     [] EVAL (LOW) 60863   [] EVAL (MOD) 23823   [] EVAL (HIGH) 29644   [] RE-EVAL   [x] TE (02188) x 3    [] Aquatic (04749) x  [] NMR (61242)   x  [] Aquatic Group (92439) x  [] Manual (39959) x    [] Ultrasound (45279) x  [] TA (20197) x  [] Mech Traction (88968)  [] Ionto (99932)           [] ES (un) (15414):   [] Vasopump (47889) [] Other:      Assessment  [x] Patient tolerated treatment well [] Patient limited by fatigue  [] Patient limited by pain  [] Patient limited by other medical complications  [] Other:     Prognosis: [x] Good [] Fair  [] Poor    Goals:    Short term goals  Time Frame for Short term goals: 3 Weeks  Short term goal 1: Pt will show independence in HEP  Short term goal 2: Pt will ambulate with normal gait with no AD      Long term goals  Time Frame for Long term goals : 6 Weeks  Long term goal 1: Pt will show 4/5 left hip strength so he can go up and down steps and walk community distances  Long term goal 2: Pt will be able to complete all ADLs     Patient Requires Follow-up: [x] Yes  [] No    Plan:   [x] Continue per plan of care [] Alter current plan (see comments)  [] Plan of care initiated [] Hold pending MD visit [] Discharge  Note: If patient does not return for scheduled/ recommended follow up visits, this note will serve as a discharge from care along with most recent update on progress.     Plan for Next Session:   Progress strengthening- most weakness in hip flexion  Progress ROM  Gait training     Electronically signed by:  Ruddy Holm, PT

## 2020-12-14 ENCOUNTER — HOSPITAL ENCOUNTER (OUTPATIENT)
Dept: PHYSICAL THERAPY | Age: 70
Setting detail: THERAPIES SERIES
Discharge: HOME | End: 2020-12-14
Payer: MEDICARE

## 2020-12-14 PROCEDURE — 97110 THERAPEUTIC EXERCISES: CPT

## 2020-12-14 PROCEDURE — 97112 NEUROMUSCULAR REEDUCATION: CPT

## 2020-12-14 NOTE — FLOWSHEET NOTE
HEP     Reviewed current and added LAQ and hip abd isometric      Gastroc stretch                           Other Therapeutic Activities:   12/4/20: Adjusted height of cane. Assessed gait with shoe off- pt to add insert to the R and take on out of L. May bring in different shoes as well. 12/9/20: Gait training around clinic without AD    Home Exercise Program:      Manual Treatments:        Modalities:     Progression Towards Functional goals:  [x] Patient is progressing as expected towards functional goals listed. [] Progression is slowed due to complexities listed. [] Progression has been slowed due to co-morbidities. [] Plan just implemented, too soon to assess goals progression  [] Other:    Charges: Therapeutic Exercise:  [x] (12871) Provided verbal/tactile cueing for activities to restore or maintain strength, flexibility, endurance, ROM for improvements with self-care, mobility, lifting and ambulation. Neuromuscular Re-Education  [] (20481) Provided verbal/tactile cueing for activities to restore or maintain balance, coordination, kinesthetic sense, posture, motor skill, proprioception for self-care, mobility, lifting, and ambulation. Therapeutic Activities:    [x] (27420) Provided verbal/tactile cueing to address functional limitations related to loss of mobility, strength, balance, and coordination.      Gait Training:  [x] (36130) Provided training and instruction to the patient for proper postural muscle recruitment and positioning with ambulation re-education     Home Exercise Program:    [x] (48712) Reviewed/Progressed HEP activities related to strengthening, flexibility, endurance, ROM for functional self-care, mobility, lifting and ambulation   [] (01082) Reviewed/Progressed HEP activities related to improving balance, coordination, kinesthetic sense, posture, motor skill, proprioception for self-care, mobility, lifting, and ambulation      Manual Treatments:  MFR / STM / Oscillations-Mobs:  G-I, II, III, IV / Manipulation / MLD  [x] (03259) Provided manual therapy to mobilize  soft tissue/joints/fluid for the purpose of modulating pain, promoting relaxation, increasing ROM, reducing/eliminating soft tissue swelling/inflammation/restriction, improving soft tissue extensibility and allowing for proper ROM for normal function with self- care, mobility, lifting and ambulation. Timed Code Treatment Minutes: 45   Total Treatment Minutes: 45     [] EVAL (LOW) 01221   [] EVAL (MOD) 74261   [] EVAL (HIGH) 03892   [] RE-EVAL   [x] TE (03430) x 2    [] Aquatic (45200) x  [x] NMR (77341)   X 1  [] Aquatic Group (94211) x  [] Manual (25968) x    [] Ultrasound (76878) x  [] TA (05650) x  [] Mech Traction (39289)  [] Ionto (96429)           [] ES (un) (98076):   [] Vasopump (79132) [] Other:      Assessment  [x] Patient tolerated treatment well [] Patient limited by fatigue  [] Patient limited by pain  [] Patient limited by other medical complications  [] Other:     Prognosis: [x] Good [] Fair  [] Poor    Goals:    Short term goals  Time Frame for Short term goals: 3 Weeks  Short term goal 1: Pt will show independence in HEP  Short term goal 2: Pt will ambulate with normal gait with no AD      Long term goals  Time Frame for Long term goals : 6 Weeks  Long term goal 1: Pt will show 4/5 left hip strength so he can go up and down steps and walk community distances  Long term goal 2: Pt will be able to complete all ADLs     Patient Requires Follow-up: [x] Yes  [] No    Plan:   [x] Continue per plan of care [] Alter current plan (see comments)  [] Plan of care initiated [] Hold pending MD visit [] Discharge  Note: If patient does not return for scheduled/ recommended follow up visits, this note will serve as a discharge from care along with most recent update on progress.     Plan for Next Session:   Progress strengthening- most weakness in hip flexion  Progress ROM  Gait training Electronically signed by:  Nicola Loza PTA

## 2020-12-16 ENCOUNTER — HOSPITAL ENCOUNTER (OUTPATIENT)
Dept: PHYSICAL THERAPY | Age: 70
Setting detail: THERAPIES SERIES
Discharge: HOME | End: 2020-12-16
Payer: MEDICARE

## 2020-12-16 PROCEDURE — 97140 MANUAL THERAPY 1/> REGIONS: CPT

## 2020-12-16 PROCEDURE — 97110 THERAPEUTIC EXERCISES: CPT

## 2020-12-16 NOTE — FLOWSHEET NOTE
Physical Therapy Daily Treatment Note  Date:  2020    Patient Name:  Jarrell Rodriguez    :  1950  MRN: [id number]    Restrictions/Precautions:  Left KALA on 11/3/20  Pertinent Medical History: HTN, Lumbar fusion 2017, left ankle sx  Medical/Treatment Diagnosis Information:  · Diagnosis: Z96.642 (ICD-10-CM) - History of left hip replacement  · Treatment Diagnosis: Gait dysfunction. Pain. Decreased left hip strength and AROM  Insurance/Certification information:  PT Insurance Information: [employer]  Physician Information:  Referring Practitioner: Dr. Nogueira Comment of care signed (Y/N):    Visit# / total visits:    Pain level: 3/10     Functional Outcomes Measure:  Test: LEFS  Score: 27 (CL)    Progress Note: []  Yes  []  No  Next due by: Visit #10      History of Injury:   Pt had left KALA on 11/3/20. States he is doing well, using RW. Pain is 0-4/10. Did home PT. Subjective:     20: Patient reports hip is doing well,just a little achy. States he has been walking with a cane. 20: States he is doing better day by day. Goal is still to stand up straighter. States muscle on front of left hip is a little sore after MD assessment  20: Doing better everyday. 20: Pt states he is doing pretty good, little sore from last visit. Balance exercises were tough  20: Patient reports he had some pain in the back of the buttock when he was getting up from the chair. 20: Patient reports he still getting some pain posterior buttock area when getting up from a chairs or commode.     Objective:   Observation:    20: Pt ambulating with mostly normal gait   Test measurements:     20: Assessed leg length difference: Right is 2 cm longer    Exercises:  Exercise/Equipment Resistance/Repetitions Other comments   Nu step L5 x 5 min         Heel Slide 20 x 2    SLR 2 x 5 with Assist     LAQ 4# 2 x 20 left    Clamshell 2 x 20    Leg Press  C/o pain in posterior left buttock   Step 6 inch  Fwd 2 x 10 Left  Lat 2 x 10 Left    Airex Mini Squat x 20     SLS     Wobble X 2 min    Mini Squat  x 10 for 10\"    HEP     Reviewed current and added LAQ and hip abd isometric      Gastroc stretch                           Other Therapeutic Activities:   12/4/20: Adjusted height of cane. Assessed gait with shoe off- pt to add insert to the R and take on out of L. May bring in different shoes as well. 12/9/20: Gait training around clinic without AD    Home Exercise Program:      Manual Treatments:    STM to left hip and PROM     Modalities:     Progression Towards Functional goals:  [x] Patient is progressing as expected towards functional goals listed. [] Progression is slowed due to complexities listed. [] Progression has been slowed due to co-morbidities. [] Plan just implemented, too soon to assess goals progression  [] Other:    Charges: Therapeutic Exercise:  [x] (55160) Provided verbal/tactile cueing for activities to restore or maintain strength, flexibility, endurance, ROM for improvements with self-care, mobility, lifting and ambulation. Neuromuscular Re-Education  [] (42292) Provided verbal/tactile cueing for activities to restore or maintain balance, coordination, kinesthetic sense, posture, motor skill, proprioception for self-care, mobility, lifting, and ambulation. Therapeutic Activities:    [x] (69484) Provided verbal/tactile cueing to address functional limitations related to loss of mobility, strength, balance, and coordination.      Gait Training:  [x] (39505) Provided training and instruction to the patient for proper postural muscle recruitment and positioning with ambulation re-education     Home Exercise Program:    [x] (82159) Reviewed/Progressed HEP activities related to strengthening, flexibility, endurance, ROM for functional self-care, mobility, lifting and ambulation   [] (80688) Reviewed/Progressed HEP activities related to improving balance, most recent update on progress.     Plan for Next Session:   Progress strengthening- most weakness in hip flexion  Progress ROM  Gait training     Electronically signed by:  Sandy Castaneda PTA

## 2020-12-21 ENCOUNTER — HOSPITAL ENCOUNTER (OUTPATIENT)
Dept: PHYSICAL THERAPY | Age: 70
Setting detail: THERAPIES SERIES
Discharge: HOME | End: 2020-12-21
Payer: MEDICARE

## 2020-12-21 PROCEDURE — 97110 THERAPEUTIC EXERCISES: CPT

## 2020-12-21 PROCEDURE — 97140 MANUAL THERAPY 1/> REGIONS: CPT

## 2020-12-21 NOTE — FLOWSHEET NOTE
Physical Therapy Daily Treatment Note  Date:  2020    Patient Name:  Tonia James    :  1950  MRN: [id number]    Restrictions/Precautions:  Left KALA on 11/3/20  Pertinent Medical History: HTN, Lumbar fusion 2017, left ankle sx  Medical/Treatment Diagnosis Information:  · Diagnosis: Z96.642 (ICD-10-CM) - History of left hip replacement  · Treatment Diagnosis: Gait dysfunction. Pain. Decreased left hip strength and AROM  Insurance/Certification information:  PT Insurance Information: [employer]  Physician Information:  Referring Practitioner: Dr. Blanca Cortés of care signed (Y/N):    Visit# / total visits:    Pain level: 3/10     Functional Outcomes Measure:  Test: LEFS  Score: 27 (CL)    Progress Note: []  Yes  []  No  Next due by: Visit #10      History of Injury:   Pt had left KALA on 11/3/20. States he is doing well, using RW. Pain is 0-4/10. Did home PT. Subjective:     20: Patient reports hip is doing well,just a little achy. States he has been walking with a cane. 20: States he is doing better day by day. Goal is still to stand up straighter. States muscle on front of left hip is a little sore after MD assessment  20: Doing better everyday. 20: Pt states he is doing pretty good, little sore from last visit. Balance exercises were tough  20: Patient reports he had some pain in the back of the buttock when he was getting up from the chair. 20: Patient reports he still getting some pain posterior buttock area when getting up from a chairs or commode. 20: States he is having less of the pain in his back of his hip.       Objective:   Observation:    20: Pt ambulating with mostly normal gait   Test measurements:     20: Assessed leg length difference: Right is 2 cm longer    Exercises:  Exercise/Equipment Resistance/Repetitions Other comments   Nu step L5 x 6 min         Heel Slide 20 x 2    SLR 4 x 5     LAQ 5# 2 x 20 left Clamshell 2 x 20    Leg Press 85 # 2 x 30 C/o pain in posterior left  buttock   Step 6 inch  Fwd 2 x 10 Left  Lat 2 x 10 Left    Airex Mini Squat x 20     SLS 3 x 15\" left    Wobble X 2 min    Mini Squat  x 10 for 10\"    Standing Hip Abduction X 10 B    HEP     Reviewed current and added LAQ and hip abd isometric      Gastroc stretch                           Other Therapeutic Activities:   12/4/20: Adjusted height of cane. Assessed gait with shoe off- pt to add insert to the R and take on out of L. May bring in different shoes as well. 12/9/20: Gait training around clinic without AD    Home Exercise Program:      Manual Treatments:     PROM to left hip     Modalities:     Progression Towards Functional goals:  [x] Patient is progressing as expected towards functional goals listed. [] Progression is slowed due to complexities listed. [] Progression has been slowed due to co-morbidities. [] Plan just implemented, too soon to assess goals progression  [] Other:    Charges: Therapeutic Exercise:  [x] (10644) Provided verbal/tactile cueing for activities to restore or maintain strength, flexibility, endurance, ROM for improvements with self-care, mobility, lifting and ambulation. Neuromuscular Re-Education  [] (23333) Provided verbal/tactile cueing for activities to restore or maintain balance, coordination, kinesthetic sense, posture, motor skill, proprioception for self-care, mobility, lifting, and ambulation. Therapeutic Activities:    [x] (03781) Provided verbal/tactile cueing to address functional limitations related to loss of mobility, strength, balance, and coordination.      Gait Training:  [x] (98136) Provided training and instruction to the patient for proper postural muscle recruitment and positioning with ambulation re-education     Home Exercise Program:    [x] (66919) Reviewed/Progressed HEP activities related to strengthening, flexibility, endurance, ROM for functional self-care, mobility, lifting and ambulation   [] (29969) Reviewed/Progressed HEP activities related to improving balance, coordination, kinesthetic sense, posture, motor skill, proprioception for self-care, mobility, lifting, and ambulation      Manual Treatments:  MFR / STM / Oscillations-Mobs:  G-I, II, III, IV / Manipulation / MLD  [x] (99110) Provided manual therapy to mobilize  soft tissue/joints/fluid for the purpose of modulating pain, promoting relaxation, increasing ROM, reducing/eliminating soft tissue swelling/inflammation/restriction, improving soft tissue extensibility and allowing for proper ROM for normal function with self- care, mobility, lifting and ambulation.         Timed Code Treatment Minutes: 44   Total Treatment Minutes: 44     [] EVAL (LOW) 40679   [] EVAL (MOD) 92303   [] EVAL (HIGH) 86746   [] RE-EVAL   [x] TE (68256) x 2    [] Aquatic (20189) x  [] NMR (86494)   X  [] Aquatic Group (06656) x  [x] Manual (78027) x1    [] Ultrasound (00309) x  [] TA (45171) x  [] Mech Traction (84786)  [] Ionto (92319)           [] ES (un) (43141):   [] Vasopump (21149) [] Other:      Assessment  [x] Patient tolerated treatment well [] Patient limited by fatigue  [] Patient limited by pain  [] Patient limited by other medical complications  [] Other:     Prognosis: [x] Good [] Fair  [] Poor    Goals:    Short term goals  Time Frame for Short term goals: 3 Weeks  Short term goal 1: Pt will show independence in HEP  Short term goal 2: Pt will ambulate with normal gait with no AD      Long term goals  Time Frame for Long term goals : 6 Weeks  Long term goal 1: Pt will show 4/5 left hip strength so he can go up and down steps and walk community distances  Long term goal 2: Pt will be able to complete all ADLs     Patient Requires Follow-up: [x] Yes  [] No    Plan:   [x] Continue per plan of care [] Alter current plan (see comments)  [] Plan of care initiated [] Hold pending MD visit [] Discharge  Note: If patient does not return for scheduled/ recommended follow up visits, this note will serve as a discharge from care along with most recent update on progress.     Plan for Next Session:   Progress strengthening- most weakness in hip flexion  Progress ROM  Gait training     Electronically signed by:  Yordan Garber PT

## 2020-12-23 ENCOUNTER — HOSPITAL ENCOUNTER (OUTPATIENT)
Dept: PHYSICAL THERAPY | Age: 70
Setting detail: THERAPIES SERIES
Discharge: HOME | End: 2020-12-23
Payer: MEDICARE

## 2020-12-23 PROCEDURE — 97110 THERAPEUTIC EXERCISES: CPT

## 2020-12-23 PROCEDURE — 97112 NEUROMUSCULAR REEDUCATION: CPT

## 2020-12-23 NOTE — FLOWSHEET NOTE
Physical Therapy Daily Treatment Note  Date:  2020    Patient Name:  Anjel Valencia    :  1950  MRN: [id number]    Restrictions/Precautions:  Left KALA on 11/3/20  Pertinent Medical History: HTN, Lumbar fusion 2017, left ankle sx  Medical/Treatment Diagnosis Information:  · Diagnosis: Z96.642 (ICD-10-CM) - History of left hip replacement  · Treatment Diagnosis: Gait dysfunction. Pain. Decreased left hip strength and AROM  Insurance/Certification information:  PT Insurance Information: [employer]  Physician Information:  Referring Practitioner: Dr. Jayjay Justice of care signed (Y/N):    Visit# / total visits:    Pain level: 3/10     Functional Outcomes Measure:  Test: LEFS  Score: 27 (CL)    Progress Note: []  Yes  []  No  Next due by: Visit #10      History of Injury:   Pt had left KALA on 11/3/20. States he is doing well, using RW. Pain is 0-4/10. Did home PT. Subjective:     20: Patient reports hip is doing well,just a little achy. States he has been walking with a cane. 20: States he is doing better day by day. Goal is still to stand up straighter. States muscle on front of left hip is a little sore after MD assessment  20: Doing better everyday. 20: Pt states he is doing pretty good, little sore from last visit. Balance exercises were tough  20: Patient reports he had some pain in the back of the buttock when he was getting up from the chair. 20: Patient reports he still getting some pain posterior buttock area when getting up from a chairs or commode. 20: States he is having less of the pain in his back of his hip.   20: Patient reports  hip is doing well,posterior hip pain is much less.     Objective:   Observation:    20: Pt ambulating with mostly normal gait   Test measurements:     20: Assessed leg length difference: Right is 2 cm longer    Exercises:  Exercise/Equipment Resistance/Repetitions Other comments   Nu step L5 x 6 min         Heel Slide 20 x 2    SLR 4 x 5     LAQ 5# 2 x 20 left    Clamshell 2 x 20    Leg Press 85 # 2 x 30    Step 6 inch  Fwd 2 x 10 Left  Lat 2 x 10 Left    Airex Mini Squat x 20     SLS 3 x 15\" left    Wobble X 2 min    Mini Squat  x 10 for 10\"    Standing Hip Abduction X 10 B    HEP     Reviewed current and added LAQ and hip abd isometric      Gastroc stretch                           Other Therapeutic Activities:   12/4/20: Adjusted height of cane. Assessed gait with shoe off- pt to add insert to the R and take on out of L. May bring in different shoes as well. 12/9/20: Gait training around clinic without AD    Home Exercise Program:      Manual Treatments:     PROM to left hip     Modalities:     Progression Towards Functional goals:  [x] Patient is progressing as expected towards functional goals listed. [] Progression is slowed due to complexities listed. [] Progression has been slowed due to co-morbidities. [] Plan just implemented, too soon to assess goals progression  [] Other:    Charges: Therapeutic Exercise:  [x] (58187) Provided verbal/tactile cueing for activities to restore or maintain strength, flexibility, endurance, ROM for improvements with self-care, mobility, lifting and ambulation. Neuromuscular Re-Education  [] (11544) Provided verbal/tactile cueing for activities to restore or maintain balance, coordination, kinesthetic sense, posture, motor skill, proprioception for self-care, mobility, lifting, and ambulation. Therapeutic Activities:    [x] (80712) Provided verbal/tactile cueing to address functional limitations related to loss of mobility, strength, balance, and coordination.      Gait Training:  [x] (19459) Provided training and instruction to the patient for proper postural muscle recruitment and positioning with ambulation re-education     Home Exercise Program:    [x] (16090) Reviewed/Progressed HEP activities related to strengthening, flexibility, endurance, ROM for functional self-care, mobility, lifting and ambulation   [] (86791) Reviewed/Progressed HEP activities related to improving balance, coordination, kinesthetic sense, posture, motor skill, proprioception for self-care, mobility, lifting, and ambulation      Manual Treatments:  MFR / STM / Oscillations-Mobs:  G-I, II, III, IV / Manipulation / MLD  [x] (70418) Provided manual therapy to mobilize  soft tissue/joints/fluid for the purpose of modulating pain, promoting relaxation, increasing ROM, reducing/eliminating soft tissue swelling/inflammation/restriction, improving soft tissue extensibility and allowing for proper ROM for normal function with self- care, mobility, lifting and ambulation.         Timed Code Treatment Minutes: 44   Total Treatment Minutes: 44     [] EVAL (LOW) 86145   [] EVAL (MOD) 41376   [] EVAL (HIGH) 58461   [] RE-EVAL   [x] TE (05793) x 2    [] Aquatic (41327) x  [x] NMR (19794)   X 1 [] Aquatic Group (56873) x  [] Manual (55355) x    [] Ultrasound (64497) x  [] TA (99955) x  [] Mech Traction (53090)  [] Ionto (78792)           [] ES (un) (36497):   [] Vasopump (32226) [] Other:      Assessment  [x] Patient tolerated treatment well [] Patient limited by fatigue  [] Patient limited by pain  [] Patient limited by other medical complications  [] Other:     Prognosis: [x] Good [] Fair  [] Poor    Goals:    Short term goals  Time Frame for Short term goals: 3 Weeks  Short term goal 1: Pt will show independence in HEP  Short term goal 2: Pt will ambulate with normal gait with no AD      Long term goals  Time Frame for Long term goals : 6 Weeks  Long term goal 1: Pt will show 4/5 left hip strength so he can go up and down steps and walk community distances  Long term goal 2: Pt will be able to complete all ADLs     Patient Requires Follow-up: [x] Yes  [] No    Plan:   [x] Continue per plan of care [] Alter current plan (see comments)  [] Plan of care initiated [] Hold pending MD visit [] Discharge  Note: If patient does not return for scheduled/ recommended follow up visits, this note will serve as a discharge from care along with most recent update on progress.     Plan for Next Session:   Progress strengthening- most weakness in hip flexion  Progress ROM  Gait training     Electronically signed by:  Slim Arellano PTA

## 2020-12-28 ENCOUNTER — HOSPITAL ENCOUNTER (OUTPATIENT)
Dept: PHYSICAL THERAPY | Age: 70
Setting detail: THERAPIES SERIES
Discharge: HOME | End: 2020-12-28
Payer: MEDICARE

## 2020-12-28 PROCEDURE — 97112 NEUROMUSCULAR REEDUCATION: CPT

## 2020-12-28 PROCEDURE — 97110 THERAPEUTIC EXERCISES: CPT

## 2020-12-28 NOTE — FLOWSHEET NOTE
Physical Therapy Daily Treatment Note  Date:  2020    Patient Name:  Renae Mathews    :  1950  MRN: [id number]    Restrictions/Precautions:  Left KALA on 11/3/20  Pertinent Medical History: HTN, Lumbar fusion 2017, left ankle sx  Medical/Treatment Diagnosis Information:  · Diagnosis: Z96.642 (ICD-10-CM) - History of left hip replacement  · Treatment Diagnosis: Gait dysfunction. Pain. Decreased left hip strength and AROM  Insurance/Certification information:  PT Insurance Information: [employer]  Physician Information:  Referring Practitioner: Dr. Sweta Mckeon of care signed (Y/N):    Visit# / total visits:  10/16  Pain level: 3/10     Functional Outcomes Measure:  Test: LEFS  Score: 27 (CL)    Progress Note: []  Yes  []  No  Next due by: Visit #10      History of Injury:   Pt had left KALA on 11/3/20. States he is doing well, using RW. Pain is 0-4/10. Did home PT. Subjective:     20: Patient reports hip is doing well,just a little achy. States he has been walking with a cane. 20: States he is doing better day by day. Goal is still to stand up straighter. States muscle on front of left hip is a little sore after MD assessment  20: Doing better everyday. 20: Pt states he is doing pretty good, little sore from last visit. Balance exercises were tough  20: Patient reports he had some pain in the back of the buttock when he was getting up from the chair. 20: Patient reports he still getting some pain posterior buttock area when getting up from a chairs or commode. 20: States he is having less of the pain in his back of his hip.   20: Patient reports  hip is doing well,posterior hip pain is much less. 20: Patient reports hip is doing well.     Objective:   Observation:    20: Pt ambulating with mostly normal gait   Test measurements:     20: Assessed leg length difference: Right is 2 cm longer    Exercises:  Exercise/Equipment Resistance/Repetitions Other comments   Nu step L5 x 6 min         Heel Slide 20 x 2    SLR 4 x 5     LAQ 5# 2 x 20 left    Clamshell 2 x 20    Leg Press 90 # 2 x 30    Step 6 inch  Fwd 2 x 10 Left  Lat 2 x 10 Left    Airex Mini Squat x 20     SLS 3 x 15\" left    Wobble X 2 min    Mini Squat  x 10 for 10\"    Standing Hip Abduction X 10 B    HEP     Reviewed current and added LAQ and hip abd isometric      Gastroc stretch                           Other Therapeutic Activities:   12/4/20: Adjusted height of cane. Assessed gait with shoe off- pt to add insert to the R and take on out of L. May bring in different shoes as well. 12/9/20: Gait training around clinic without AD    Home Exercise Program:      Manual Treatments:     PROM to left hip     Modalities:     Progression Towards Functional goals:  [x] Patient is progressing as expected towards functional goals listed. [] Progression is slowed due to complexities listed. [] Progression has been slowed due to co-morbidities. [] Plan just implemented, too soon to assess goals progression  [] Other:    Charges: Therapeutic Exercise:  [x] (01529) Provided verbal/tactile cueing for activities to restore or maintain strength, flexibility, endurance, ROM for improvements with self-care, mobility, lifting and ambulation. Neuromuscular Re-Education  [] (72581) Provided verbal/tactile cueing for activities to restore or maintain balance, coordination, kinesthetic sense, posture, motor skill, proprioception for self-care, mobility, lifting, and ambulation. Therapeutic Activities:    [x] (63395) Provided verbal/tactile cueing to address functional limitations related to loss of mobility, strength, balance, and coordination.      Gait Training:  [x] (39569) Provided training and instruction to the patient for proper postural muscle recruitment and positioning with ambulation re-education     Home Exercise Program:    [x] (63017) Reviewed/Progressed HEP activities related to strengthening, flexibility, endurance, ROM for functional self-care, mobility, lifting and ambulation   [] (13114) Reviewed/Progressed HEP activities related to improving balance, coordination, kinesthetic sense, posture, motor skill, proprioception for self-care, mobility, lifting, and ambulation      Manual Treatments:  MFR / STM / Oscillations-Mobs:  G-I, II, III, IV / Manipulation / MLD  [x] (00155) Provided manual therapy to mobilize  soft tissue/joints/fluid for the purpose of modulating pain, promoting relaxation, increasing ROM, reducing/eliminating soft tissue swelling/inflammation/restriction, improving soft tissue extensibility and allowing for proper ROM for normal function with self- care, mobility, lifting and ambulation.         Timed Code Treatment Minutes: 44   Total Treatment Minutes: 44     [] EVAL (LOW) 64450   [] EVAL (MOD) 47455   [] EVAL (HIGH) 40400   [] RE-EVAL   [x] TE (03362) x 2    [] Aquatic (05600) x  [x] NMR (58023)   X 1 [] Aquatic Group (80076) x  [] Manual (97259) x    [] Ultrasound (52943) x  [] TA (80745) x  [] Mech Traction (23327)  [] Ionto (13855)           [] ES (un) (08486):   [] Vasopump (33037) [] Other:      Assessment  [x] Patient tolerated treatment well [] Patient limited by fatigue  [] Patient limited by pain  [] Patient limited by other medical complications  [] Other:     Prognosis: [x] Good [] Fair  [] Poor    Goals:    Short term goals  Time Frame for Short term goals: 3 Weeks  Short term goal 1: Pt will show independence in HEP  Short term goal 2: Pt will ambulate with normal gait with no AD      Long term goals  Time Frame for Long term goals : 6 Weeks  Long term goal 1: Pt will show 4/5 left hip strength so he can go up and down steps and walk community distances  Long term goal 2: Pt will be able to complete all ADLs     Patient Requires Follow-up: [x] Yes  [] No    Plan:   [x] Continue per plan of care [] Alter current plan (see comments)  [] Plan of care initiated [] Hold pending MD visit [] Discharge  Note: If patient does not return for scheduled/ recommended follow up visits, this note will serve as a discharge from care along with most recent update on progress.     Plan for Next Session:   Progress strengthening- most weakness in hip flexion  Progress ROM  Gait training     Electronically signed by:  Sd Lucio PTA

## 2020-12-30 ENCOUNTER — HOSPITAL ENCOUNTER (OUTPATIENT)
Dept: PHYSICAL THERAPY | Age: 70
Setting detail: THERAPIES SERIES
Discharge: HOME | End: 2020-12-30
Payer: MEDICARE

## 2020-12-30 PROCEDURE — 97110 THERAPEUTIC EXERCISES: CPT

## 2020-12-30 PROCEDURE — 97530 THERAPEUTIC ACTIVITIES: CPT

## 2020-12-30 NOTE — FLOWSHEET NOTE
Physical Therapy Daily Treatment Note  Date:  2020    Patient Name:  Wes Hobson    :  1950  MRN: [id number]    Restrictions/Precautions:  Left KALA on 11/3/20  Pertinent Medical History: HTN, Lumbar fusion 2017, left ankle sx  Medical/Treatment Diagnosis Information:  · Diagnosis: Z96.642 (ICD-10-CM) - History of left hip replacement  · Treatment Diagnosis: Gait dysfunction. Pain. Decreased left hip strength and AROM  Insurance/Certification information:  PT Insurance Information: [employer]  Physician Information:  Referring Practitioner: Dr. Sammi Gonzales of care signed (Y/N):    Visit# / total visits:    Pain level: 3/10     Functional Outcomes Measure:  Test: LEFS  Score: 27 (CL)    Progress Note: []  Yes  []  No  Next due by: Visit #10      History of Injury:   Pt had left KALA on 11/3/20. States he is doing well, using RW. Pain is 0-4/10. Did home PT. Subjective:     20: Patient reports hip is doing well,just a little achy. States he has been walking with a cane. 20: States he is doing better day by day. Goal is still to stand up straighter. States muscle on front of left hip is a little sore after MD assessment  20: Doing better everyday. 20: Pt states he is doing pretty good, little sore from last visit. Balance exercises were tough  20: Patient reports he had some pain in the back of the buttock when he was getting up from the chair. 20: Patient reports he still getting some pain posterior buttock area when getting up from a chairs or commode. 20: States he is having less of the pain in his back of his hip.   20: Patient reports  hip is doing well,posterior hip pain is much less. 20: Patient reports hip is doing well.   20: Doing good, stopped the partial plank    Objective:   Observation:    20: Pt ambulating with mostly normal gait   Test measurements:     20: Assessed leg length difference: Right is 2 cm longer   12/30/20: Left LE Strength: Hip Abduction 4-/5 and Flexion 3+/5    Exercises:  Exercise/Equipment Resistance/Repetitions Other comments   Nu step L5 x 6 min         Heel Slide 20 x 2    SLR 8 x 5 reps  Sidelying  4 x 5 reps  Supine    LAQ 7.5# 2 x 20 left    Clamshell  x 20    Leg Press 75 # 2 x 30 Had to stop due to some pain in R hip   Step 6 inch  Fwd 2 x 20 Left  Lat 2 x 20 Left    Airex Mini Squat x 20     SLS 3 x 15\" left    Wobble X 2 min       Standing Hip Abduction X 10 B    HEP     Reviewed current and added LAQ and hip abd isometric      Gastroc stretch                           Other Therapeutic Activities:   12/4/20: Adjusted height of cane. Assessed gait with shoe off- pt to add insert to the R and take on out of L. May bring in different shoes as well. 12/9/20: Gait training around clinic without AD    Home Exercise Program:      Manual Treatments:     PROM to left hip     Modalities:     Progression Towards Functional goals:  [x] Patient is progressing as expected towards functional goals listed. [] Progression is slowed due to complexities listed. [] Progression has been slowed due to co-morbidities. [] Plan just implemented, too soon to assess goals progression  [] Other:    Charges: Therapeutic Exercise:  [x] (61052) Provided verbal/tactile cueing for activities to restore or maintain strength, flexibility, endurance, ROM for improvements with self-care, mobility, lifting and ambulation. Neuromuscular Re-Education  [] (79904) Provided verbal/tactile cueing for activities to restore or maintain balance, coordination, kinesthetic sense, posture, motor skill, proprioception for self-care, mobility, lifting, and ambulation. Therapeutic Activities:    [x] (20886) Provided verbal/tactile cueing to address functional limitations related to loss of mobility, strength, balance, and coordination.      Gait Training:  [x] (37706) Provided training and instruction to the patient for proper postural muscle recruitment and positioning with ambulation re-education     Home Exercise Program:    [x] (64324) Reviewed/Progressed HEP activities related to strengthening, flexibility, endurance, ROM for functional self-care, mobility, lifting and ambulation   [] (70381) Reviewed/Progressed HEP activities related to improving balance, coordination, kinesthetic sense, posture, motor skill, proprioception for self-care, mobility, lifting, and ambulation      Manual Treatments:  MFR / STM / Oscillations-Mobs:  G-I, II, III, IV / Manipulation / MLD  [x] (01652) Provided manual therapy to mobilize  soft tissue/joints/fluid for the purpose of modulating pain, promoting relaxation, increasing ROM, reducing/eliminating soft tissue swelling/inflammation/restriction, improving soft tissue extensibility and allowing for proper ROM for normal function with self- care, mobility, lifting and ambulation.         Timed Code Treatment Minutes: 45   Total Treatment Minutes: 45     [] EVAL (LOW) 06133   [] EVAL (MOD) 89227   [] EVAL (HIGH) 20156   [] RE-EVAL   [x] TE (76501) x 2    [] Aquatic (72790) x  [x] NMR (84552)   X 1 [] Aquatic Group (56327) x  [] Manual (07873) x    [] Ultrasound (47172) x  [] TA (46590) x  [] Mech Traction (71579)  [] Ionto (00844)           [] ES (un) (78278):   [] Vasopump (47483) [] Other:      Assessment  [x] Patient tolerated treatment well [] Patient limited by fatigue  [] Patient limited by pain  [] Patient limited by other medical complications  [] Other:     Prognosis: [x] Good [] Fair  [] Poor    Goals:    Short term goals  Time Frame for Short term goals: 3 Weeks  Short term goal 1: Pt will show independence in HEP  Short term goal 2: Pt will ambulate with normal gait with no AD      Long term goals  Time Frame for Long term goals : 6 Weeks  Long term goal 1: Pt will show 4/5 left hip strength so he can go up and down steps and walk community distances  Long term goal 2: Pt will be able to complete all ADLs     Patient Requires Follow-up: [x] Yes  [] No    Plan:   [] Continue per plan of care [] Alter current plan (see comments)  [] Plan of care initiated [] Hold pending MD visit [x] Discharge  Note: If patient does not return for scheduled/ recommended follow up visits, this note will serve as a discharge from care along with most recent update on progress.     Plan for Next Session:       Electronically signed by:  Iva Severance, PT

## 2021-01-11 ENCOUNTER — OFFICE VISIT (OUTPATIENT)
Dept: ORTHOPEDIC SURGERY | Age: 71
End: 2021-01-11
Payer: MEDICARE

## 2021-01-11 VITALS — WEIGHT: 258 LBS | TEMPERATURE: 96.4 F | HEIGHT: 74 IN | BODY MASS INDEX: 33.11 KG/M2

## 2021-01-11 DIAGNOSIS — M19.012 ARTHRITIS OF LEFT SHOULDER REGION: Primary | ICD-10-CM

## 2021-01-11 DIAGNOSIS — M16.12 PRIMARY OSTEOARTHRITIS OF LEFT HIP: ICD-10-CM

## 2021-01-11 DIAGNOSIS — M17.11 PRIMARY OSTEOARTHRITIS OF RIGHT KNEE: ICD-10-CM

## 2021-01-11 PROCEDURE — G8417 CALC BMI ABV UP PARAM F/U: HCPCS | Performed by: PHYSICIAN ASSISTANT

## 2021-01-11 PROCEDURE — 99214 OFFICE O/P EST MOD 30 MIN: CPT | Performed by: PHYSICIAN ASSISTANT

## 2021-01-11 PROCEDURE — 20610 DRAIN/INJ JOINT/BURSA W/O US: CPT | Performed by: PHYSICIAN ASSISTANT

## 2021-01-11 PROCEDURE — 1036F TOBACCO NON-USER: CPT | Performed by: PHYSICIAN ASSISTANT

## 2021-01-11 PROCEDURE — 1123F ACP DISCUSS/DSCN MKR DOCD: CPT | Performed by: PHYSICIAN ASSISTANT

## 2021-01-11 PROCEDURE — 3017F COLORECTAL CA SCREEN DOC REV: CPT | Performed by: PHYSICIAN ASSISTANT

## 2021-01-11 PROCEDURE — 4040F PNEUMOC VAC/ADMIN/RCVD: CPT | Performed by: PHYSICIAN ASSISTANT

## 2021-01-11 PROCEDURE — G8427 DOCREV CUR MEDS BY ELIG CLIN: HCPCS | Performed by: PHYSICIAN ASSISTANT

## 2021-01-11 PROCEDURE — G8484 FLU IMMUNIZE NO ADMIN: HCPCS | Performed by: PHYSICIAN ASSISTANT

## 2021-01-11 NOTE — PROGRESS NOTES
This dictation was done with college[employer] dictation and may contain mechanical errors related to translation. I have today reviewed with Ravindra Eisenberg the clinically relevant, past medical history, medications, allergies, family history, social history, and Review Of Systems form the patients most recent history form & I have documented any details relevant to today's presenting complaints in my history below. Mr. Herbert Hansen self-reported past medical history, medications, allergies, family history, social history, and Review Of Systems form has been scanned into the chart under the \"Media\" tab. Subjective:  Ravindra Eisenberg is a 69 y.o. who is here in follow-up for his left total hip replacement from 11/3/2020. He states his pain scores a 5 out of 10. He says with doing physical therapy and getting more aggressive with the squats and standing he is getting some popping and he is concerned about apprehension in the posterior aspect of his left hip. I had Dr. Daniel Malik look at him and we ordered x-rays. At this visit the X-rays, presenting symptoms, and chief complaint were presented to Rochelle Garcia MD.  He then evaluated the patient. He spent several minutes discussing face to face with the patient the clinical diagnosis and medical course options,from conservative to aggressive including risks and benefits.   He is also complaining of return of pain over the last few weeks into his left shoulder and his right knee he has had very good success in the past with cortisone injections in this area      Patient Active Problem List   Diagnosis    Elbow pain    Hyperlipidemia    HTN (hypertension)    Vitamin D deficiency    MTHFR mutation ([facility] [address]    Insulin resistance    Lumbosacral radiculopathy at L5    Pain of left hip joint    Arthritis of left hip    Spinal stenosis of lumbar region    Postlaminectomy syndrome    Status post lumbar spinal fusion    10/12/17 Removal fixation T11-L3 ; laminectomy L3-4 L4-L5; posterior spinal fusion L2-L3, L3-L4     Arthritis of left shoulder region    Primary osteoarthritis of right knee           Current Outpatient Medications on File Prior to Visit   Medication Sig Dispense Refill    fosinopril (MONOPRIL) 10 MG tablet Take 20 mg by mouth nightly      Cholecalciferol (VITAMIN D3) 50 MCG (2000 UT) CAPS Take by mouth daily      metFORMIN (GLUCOPHAGE) 500 MG tablet Take 1 tablet by mouth 2 times daily (with meals) 60 tablet 1    CRESTOR 40 MG tablet TAKE ONE TABLET BY MOUTH EVERY EVENING 90 tablet 2    Thiamine HCl (VITAMIN B-1 PO) Take  by mouth daily.  aspirin EC 81 MG EC tablet Take 1 tablet by mouth 2 times daily Take for DVT blood clot prophylaxis. Please avoid missing doses. 60 tablet 0     No current facility-administered medications on file prior to visit. Objective:   Temperature 96.4 °F (35.8 °C), temperature source Infrared, height 6' 2\" (1.88 m), weight 258 lb (117 kg). On examination today is a pleasant 70-year-old gentleman in no acute distress he is alert and oriented x3 actually has good hip flexion and abduction strength already he has some tightness in the posterior aspect almost around the sciatic area or posterior hip when he goes into full extension. He is neurologically intact to the left foot with good dorsiflexion plantarflexion strength. He has 0 to 140 degrees of motion in his right knee with 1+ edema and medial joint line tenderness consistent with the osteoarthritis. His left shoulder has some rotator cuff tendinitis and weakness on examination of the supraspinatus. Is a positive impingement test.  He is negative for relocation or Spurling's test.  Neuro exam grossly intact both lower extremities. Intact sensation to light touch. Motor exam 4+ to 5/5 in all major motor groups. Negative Roberts's sign. Skin is warm, dry and intact with out erythema or significant increased temperature around the knee joint(s).      There are no cutaneous lesions or lymphadenopathy present. X-RAYS:  X-rays taken the office today AP pelvis and AP and lateral of the left hip, show excellent alignment sizing and fit of the hip prosthesis. The small metal partial ring that was seen previously postoperatively has not shifted and appears to be settled in a certain position. This was discussed with the patient by Dr. Ainsley Wheatley. Assessment:  Stable healing left total hip replacement, left shoulder rotator cuff tendinitis, right knee DJD    Plan:  During today's visit, there was approximately 30 minutes of face-to-face discussion in regards to the patient's current condition and treatment options. More than 50 % of the time was counseling and coordination of care as indicated above. We will continue with the physical therapy per the protocol for his left hip and see him in follow-up in 4 weeks. After a discussion of the multiple options, they consented to a cortisone shot. 1 ml of 40mg/ml Kenalog and 2 ml's of 0.25%Marcaine were injected into the right knee joint space. The leg was slightly flexed and injected just lateral to the patella tendon to under the patella. This was done with sterile technique and they tolerated it well. After a discussion of the multiple options, they consented to a cortisone shot. 1 ml of 40mg/ml Kenalog and 2 ml's of 0.25%Marcaine were injected into the left shoulder sub acromial space. This was done with sterile technique and they tolerated it well.         PROCEDURE NOTE:   Cortisone shot for the left shoulder and right knee and repeat x-rays on examination of the left hip      They will schedule a follow up in 4 weeks as needed

## 2021-01-20 NOTE — PROGRESS NOTES
[address] [facility] [address] [place] [address]  Phone: [phone]   Fax:     [phone]    Physical Therapy Discharge Summary    Dear  Lydia Sadler,    We had the pleasure of treating the following patient for physical therapy services at [address] [place] [address]. A summary of our findings can be found in the discharge summary below. This includes our final assessment. If you have any questions or concerns regarding these findings, please do not hesitate to contact me at the office phone number checked above. Thank you for the referral.       Physician Signature:________________________________Date:__________________  By signing above, therapists plan is approved by physician        Patient: Mala Alvarez   : 1950   MRN: [id number]  Referring Physician:        Evaluation Date: 2021        Medical/Treatment Diagnosis Information:  · Diagnosis: T79.123 (ICD-10-CM) - History of left hip replacement  · Treatment Diagnosis: Gait dysfunction. Pain. Decreased left hip strength and AROM  Insurance/Certification information:  PT Insurance Information: [employer]  Date Range of Treatment:  to 20   Total visits:11        SUBJECTIVE:  20: Doing good, stopped the partial plank      Pain Scale: 3/10    Type: []Constant   []Intermitment  []Radiating []Localized  []other:     Functional Limitations: []Sitting []Standing []Walking    []Squatting []Stairs            []ADL's  []Driving []Sports/Recreation  []Other:      · OBJECTIVE:   20: Left LE Strength:  Hip Abduction 4-/5 and Flexion 3+/5        ASSESSMENT: Pt was progressing with ROM and strength then saw MD and they recommended he hold on therapy due to some new pains he was getting in his hip      Response to Treatment:   []Patient met all goals and has responded well to treatment and will continue HEP   []Patient should continue to improve in reasonable time if

## 2021-02-25 ENCOUNTER — IMMUNIZATION (OUTPATIENT)
Dept: PRIMARY CARE CLINIC | Age: 71
End: 2021-02-25
Payer: MEDICARE

## 2021-02-25 PROCEDURE — 91300 COVID-19, PFIZER VACCINE 30MCG/0.3ML DOSE: CPT | Performed by: NURSE PRACTITIONER

## 2021-02-25 PROCEDURE — 0001A COVID-19, PFIZER VACCINE 30MCG/0.3ML DOSE: CPT | Performed by: NURSE PRACTITIONER

## 2021-03-18 ENCOUNTER — IMMUNIZATION (OUTPATIENT)
Dept: PRIMARY CARE CLINIC | Age: 71
End: 2021-03-18
Payer: MEDICARE

## 2021-03-18 PROCEDURE — 91300 COVID-19, PFIZER VACCINE 30MCG/0.3ML DOSE: CPT | Performed by: NURSE PRACTITIONER

## 2021-03-18 PROCEDURE — 0002A PR IMM ADMN SARSCOV2 30MCG/0.3ML DIL RECON 2ND DOSE: CPT | Performed by: NURSE PRACTITIONER

## 2021-04-12 ENCOUNTER — OFFICE VISIT (OUTPATIENT)
Dept: ORTHOPEDIC SURGERY | Age: 71
End: 2021-04-12
Payer: MEDICARE

## 2021-04-12 VITALS
DIASTOLIC BLOOD PRESSURE: 79 MMHG | WEIGHT: 260 LBS | HEIGHT: 74 IN | BODY MASS INDEX: 33.37 KG/M2 | HEART RATE: 68 BPM | SYSTOLIC BLOOD PRESSURE: 133 MMHG

## 2021-04-12 DIAGNOSIS — Z96.642 STATUS POST TOTAL REPLACEMENT OF LEFT HIP: Primary | ICD-10-CM

## 2021-04-12 DIAGNOSIS — M19.012 ARTHRITIS OF LEFT SHOULDER REGION: ICD-10-CM

## 2021-04-12 DIAGNOSIS — M17.11 PRIMARY OSTEOARTHRITIS OF RIGHT KNEE: ICD-10-CM

## 2021-04-12 PROCEDURE — G8417 CALC BMI ABV UP PARAM F/U: HCPCS | Performed by: ORTHOPAEDIC SURGERY

## 2021-04-12 PROCEDURE — 3017F COLORECTAL CA SCREEN DOC REV: CPT | Performed by: ORTHOPAEDIC SURGERY

## 2021-04-12 PROCEDURE — 20610 DRAIN/INJ JOINT/BURSA W/O US: CPT | Performed by: ORTHOPAEDIC SURGERY

## 2021-04-12 PROCEDURE — 1123F ACP DISCUSS/DSCN MKR DOCD: CPT | Performed by: ORTHOPAEDIC SURGERY

## 2021-04-12 PROCEDURE — 99213 OFFICE O/P EST LOW 20 MIN: CPT | Performed by: ORTHOPAEDIC SURGERY

## 2021-04-12 PROCEDURE — 1036F TOBACCO NON-USER: CPT | Performed by: ORTHOPAEDIC SURGERY

## 2021-04-12 PROCEDURE — 4040F PNEUMOC VAC/ADMIN/RCVD: CPT | Performed by: ORTHOPAEDIC SURGERY

## 2021-04-12 PROCEDURE — G8427 DOCREV CUR MEDS BY ELIG CLIN: HCPCS | Performed by: ORTHOPAEDIC SURGERY

## 2021-04-12 NOTE — PROGRESS NOTES
[facility]  Office Visit    Chief Complaint: Follow-up s/p left total hip arthroplasty, right knee pain, left shoulder pain    HPI:  Nile Ramos is a 69 y.o. who is here in follow-up of left total hip arthroplasty performed on November 3, 2020. He is doing well. He walks without assistive device. He reports doing well overall. His motion continues to improve. The popping he bent he has described in the past is occurring less frequently. He does report right knee pain left shoulder pain on a daily basis due to a history of osteoarthritis. He has had injections in these areas before and this is helped with the pain. He is requesting repeat injections in his left shoulder and right knee joints today. Patient Active Problem List   Diagnosis    Elbow pain    Hyperlipidemia    HTN (hypertension)    Vitamin D deficiency    MTHFR mutation (HCC)    Insulin resistance    Lumbosacral radiculopathy at L5    Pain of left hip joint    Arthritis of left hip    Spinal stenosis of lumbar region    Postlaminectomy syndrome    Status post lumbar spinal fusion    10/12/17 Removal fixation T11-L3 ; laminectomy L3-4 L4-L5; posterior spinal fusion L2-L3, L3-L4     Arthritis of left shoulder region    Primary osteoarthritis of right knee       ROS:  Constitutional: denies fever, chills, weight loss  MSK: denies pain in other joints, muscle aches  Neurological: denies numbness, tingling, weakness    Exam:  Blood pressure 133/79, pulse 68, height 6' 2\" (1.88 m), weight 260 lb (117.9 kg). Appearance: sitting in exam room chair, appears to be in no acute distress, awake and alert  Resp: unlabored breathing on room air  Skin: warm, dry and intact with out erythema or significant increased temperature  Neuro: grossly intact both lower extremities. Intact sensation to light touch. Motor exam 4+ to 5/5 in all major motor groups.   LUE: Examination of the shoulder shows no gross defects. Forward flexion is 150 degrees, external rotation 35 degrees, internal rotation to lumbar spine. Crepitus with active shoulder range of motion sensation is intact light touch. Brisk capillary refill. 2+ radial pulses. Strength is 5/5 in all muscle groups. Left hip: Examination demonstrates negative logroll and negative Stinchfield. There is brisk capillary refill. There are 2+ dorsalis pedis and posterior tibial pulses. Strength is 5/5 in hamstrings, quads, hip flexors. Right knee: Examination reveals that knee range of motion is 0 to 130 degrees. There is varus deformity, positive crepitus, positive joint line tenderness, positive antalgic gait. Neurologically, plantar flexion and dorsiflexion is intact. Pulses palpable distally. 5/5 strength. Imaging:  AP pelvis, AP and frog-leg lateral views of the left hip were performed and interpreted today. There is a total hip arthroplasty prosthesis in place with no signs of osteolysis, loosening, fracture, dislocation. The C-ring that is visualized is unchanged in appearance compared to prior radiographs. Assessment:  S/p left total hip arthroplasty  Right knee osteoarthritis  Left glenohumeral osteoarthritis    Plan:  He is doing well after left total hip arthroplasty. He will continue activity as tolerated. We discussed with the patient today the use of antibiotic prophylaxis prior to any dental procedures. We discussed that the antibiotic prophylaxis would be used to help prevent periprosthetic joint infections. If they were not offered antibiotics by the physician performing the procedure, they should contact our office that we may prescribe them antibiotics. Follow-up on an annual basis for the left hip. We discussed his left shoulder and right knee osteoarthritis. He wishes to continue managing these with injections as needed. Steroid injections were performed in each of these as described below today.   He will follow-up in 3 to 4 months for possible repeat injections. Procedure:  After verbal consent was obtained, the patient's left shoulder was prepped with Betadine and anesthetized with ethyl chloride. The glenohumeral joint was then injected under sterile technique with 2mL of 0.25% marcaine and 1 mL of 40 mg/mL Kenalog. A bandage was applied. The patient tolerated procedure well and there were no complications. After verbal consent was obtained, the patient's right knee was prepped with betadine. Skin was anesthetized with ethyl chloride. The knee was then injected under sterile technique with 2mL of 0.25% marcaine and 1 mL of 40 mg/mL Kenalog. A bandage was applied. The patient tolerated the procedure well and there were no complications. Total time spent on today's encounter was at least 23 minutes. This time included reviewing prior notes, radiographs, and lab results when available, reviewing history obtained by medical assistant, performing history and physical exam, reviewing tests/radiographs with the patient, counseling the patient, ordering medications or tests, documentation in the electronic health record, and coordination of care. This dictation was done with Dragon dictation and may contain mechanical errors related to translation.

## 2021-07-15 ENCOUNTER — OFFICE VISIT (OUTPATIENT)
Dept: ORTHOPEDIC SURGERY | Age: 71
End: 2021-07-15
Payer: MEDICARE

## 2021-07-15 DIAGNOSIS — M19.012 ARTHRITIS OF LEFT SHOULDER REGION: Primary | ICD-10-CM

## 2021-07-15 DIAGNOSIS — M17.11 PRIMARY OSTEOARTHRITIS OF RIGHT KNEE: ICD-10-CM

## 2021-07-15 DIAGNOSIS — M17.12 PRIMARY OSTEOARTHRITIS OF LEFT KNEE: ICD-10-CM

## 2021-07-15 PROCEDURE — 3017F COLORECTAL CA SCREEN DOC REV: CPT | Performed by: ORTHOPAEDIC SURGERY

## 2021-07-15 PROCEDURE — 99214 OFFICE O/P EST MOD 30 MIN: CPT | Performed by: ORTHOPAEDIC SURGERY

## 2021-07-15 PROCEDURE — G8427 DOCREV CUR MEDS BY ELIG CLIN: HCPCS | Performed by: ORTHOPAEDIC SURGERY

## 2021-07-15 PROCEDURE — 20610 DRAIN/INJ JOINT/BURSA W/O US: CPT | Performed by: ORTHOPAEDIC SURGERY

## 2021-07-15 PROCEDURE — 1123F ACP DISCUSS/DSCN MKR DOCD: CPT | Performed by: ORTHOPAEDIC SURGERY

## 2021-07-15 PROCEDURE — 4040F PNEUMOC VAC/ADMIN/RCVD: CPT | Performed by: ORTHOPAEDIC SURGERY

## 2021-07-15 PROCEDURE — 1036F TOBACCO NON-USER: CPT | Performed by: ORTHOPAEDIC SURGERY

## 2021-07-15 PROCEDURE — G8417 CALC BMI ABV UP PARAM F/U: HCPCS | Performed by: ORTHOPAEDIC SURGERY

## 2021-07-15 NOTE — PROGRESS NOTES
[facility]  Office Visit    Chief Complaint: Follow-up bilateral knee pain, left shoulder pain    HPI:  Brisa Rose is a 69 y.o. who is here in follow-up of bilateral knee pain and left shoulder pain. He has a known history of osteoarthritis of all of these joints. He also has a recent anterior left total hip arthroplasty with Dr. Meryle Ellison in November 2020. He does report bilateral knee pain, worse on the right, and left shoulder pain on a daily basis due to a history of osteoarthritis. He has had injections in these areas before and this is helped with the pain. He is requesting repeat injections in his left shoulder and both knee joints today. Patient Active Problem List   Diagnosis    Elbow pain    Hyperlipidemia    HTN (hypertension)    Vitamin D deficiency    MTHFR mutation    Insulin resistance    Lumbosacral radiculopathy at L5    Pain of left hip joint    Arthritis of left hip    Spinal stenosis of lumbar region    Postlaminectomy syndrome    Status post lumbar spinal fusion    10/12/17 Removal fixation T11-L3 ; laminectomy L3-4 L4-L5; posterior spinal fusion L2-L3, L3-L4     Arthritis of left shoulder region    Primary osteoarthritis of right knee       ROS:  Constitutional: denies fever, chills, weight loss  MSK: denies pain in other joints, muscle aches  Neurological: denies numbness, tingling, weakness    Exam:  Appearance: sitting in exam room chair, appears to be in no acute distress, awake and alert  Resp: unlabored breathing on room air  Skin: warm, dry and intact with out erythema or significant increased temperature  Neuro: grossly intact both lower extremities. Intact sensation to light touch. Motor exam 4+ to 5/5 in all major motor groups. LUE: Examination of the shoulder shows no gross defects. Forward flexion is 150 degrees, external rotation 35 degrees, internal rotation to lumbar spine.   Crepitus with active shoulder range of motion sensation is intact light touch. Brisk capillary refill. 2+ radial pulses. Strength is 5/5 in all muscle groups. Bilateral knees:  Examination reveals that knee range of motion is 0 to 130 degrees. There is varus deformity, positive crepitus, positive joint line tenderness, positive antalgic gait. Neurologically, plantar flexion and dorsiflexion is intact. Pulses palpable distally. 5/5 strength. Imaging:  3 views of both knees were performed and interpreted today. Significant for tricompartmental degenerative changes bilaterally. There are no fractures or dislocations. 3 views the left shoulder were performed and interpreted today. He has bone-on-bone osteoarthritis with a large inferior humeral head osteophyte. Assessment:  Bilateral knee osteoarthritis  Left glenohumeral osteoarthritis    Plan:  We discussed his left shoulder and bilateral knee osteoarthritis. He wishes to continue managing these with injections as needed. Steroid injections were performed in each of these as described below today. He will follow-up in 3 to 4 months for possible repeat injections. Procedure:  After verbal consent was obtained, the patient's left shoulder was prepped with alcohol and anesthetized with ethyl chloride. The glenohumeral joint was then injected under sterile technique with 2mL of 0.25% marcaine and 1 mL of 40 mg/mL Kenalog. A bandage was applied. The patient tolerated procedure well and there were no complications. After verbal consent was obtained, bilateral knees were prepped with Betadine and anesthetized with ethyl chloride. Each knee joint was then injected under sterile technique with 2 mL of 0.25% marcaine and 1 mL of 40 mg/mL Kenalog. Bandages were applied. The patient tolerated the procedure well and there were no complications. Total time spent on today's encounter was at least 32 minutes.  This time included reviewing prior notes, radiographs, and lab results when available, reviewing history obtained by medical assistant, performing history and physical exam, reviewing tests/radiographs with the patient, counseling the patient, ordering medications or tests, documentation in the electronic health record, and coordination of care. This dictation was done with Dragon dictation and may contain mechanical errors related to translation.

## 2021-10-18 ENCOUNTER — PREP FOR PROCEDURE (OUTPATIENT)
Dept: ORTHOPEDIC SURGERY | Age: 71
End: 2021-10-18

## 2021-10-18 ENCOUNTER — OFFICE VISIT (OUTPATIENT)
Dept: ORTHOPEDIC SURGERY | Age: 71
End: 2021-10-18
Payer: MEDICARE

## 2021-10-18 VITALS — HEIGHT: 74 IN | BODY MASS INDEX: 33.37 KG/M2 | RESPIRATION RATE: 16 BRPM | WEIGHT: 260 LBS

## 2021-10-18 DIAGNOSIS — M17.12 PRIMARY OSTEOARTHRITIS OF LEFT KNEE: ICD-10-CM

## 2021-10-18 DIAGNOSIS — M17.11 PRIMARY OSTEOARTHRITIS OF RIGHT KNEE: Primary | ICD-10-CM

## 2021-10-18 DIAGNOSIS — M19.012 ARTHRITIS OF LEFT SHOULDER REGION: ICD-10-CM

## 2021-10-18 DIAGNOSIS — M19.012 ARTHRITIS OF LEFT SHOULDER REGION: Primary | ICD-10-CM

## 2021-10-18 PROCEDURE — 1123F ACP DISCUSS/DSCN MKR DOCD: CPT | Performed by: PHYSICIAN ASSISTANT

## 2021-10-18 PROCEDURE — G8484 FLU IMMUNIZE NO ADMIN: HCPCS | Performed by: PHYSICIAN ASSISTANT

## 2021-10-18 PROCEDURE — 4040F PNEUMOC VAC/ADMIN/RCVD: CPT | Performed by: PHYSICIAN ASSISTANT

## 2021-10-18 PROCEDURE — 99214 OFFICE O/P EST MOD 30 MIN: CPT | Performed by: PHYSICIAN ASSISTANT

## 2021-10-18 PROCEDURE — G8417 CALC BMI ABV UP PARAM F/U: HCPCS | Performed by: PHYSICIAN ASSISTANT

## 2021-10-18 PROCEDURE — 20610 DRAIN/INJ JOINT/BURSA W/O US: CPT | Performed by: PHYSICIAN ASSISTANT

## 2021-10-18 PROCEDURE — 3017F COLORECTAL CA SCREEN DOC REV: CPT | Performed by: PHYSICIAN ASSISTANT

## 2021-10-18 PROCEDURE — G8427 DOCREV CUR MEDS BY ELIG CLIN: HCPCS | Performed by: PHYSICIAN ASSISTANT

## 2021-10-18 PROCEDURE — 1036F TOBACCO NON-USER: CPT | Performed by: PHYSICIAN ASSISTANT

## 2021-10-18 NOTE — LETTER
[facility]  Surgery Scheduling Form:  [facility]      Patient Name:  Otilia Bowser  Patient :  1950   Patient PN#:    Patient Phone:  [phone] (home)                               Patient Address:  [address] [address] [facility] [address]    PCP:  Leslie Greenberg MD  Insurance:   Payor/Plan Subscr  Sex Relation Sub. Ins. ID Effective Group Num   1.  [address] 1950 Male Self [id number]                                   [address] [facility]     DIAGNOSIS & PROCEDURE:                                                                                              Diagnosis:    Arthritis Left shoulder    M19.012                                                                  Operation:  left reverse total shoulder replacement   [id number]  Location:  [place]  Surgeon:  Tiera Roach MD    SCHEDULING INFORMATION:                                                                                         .  Surgeon's Scheduling Instruction:  ASAP    Requested Date:   TIME:  2:50  OR Time Required:   90 minutes  Anesthesia:  General  Equipment:  Tornier  Mini C-Arm:  No   Standard C-Arm:  No  Status:  Same day admit                                 COVID:      PAT Required:  Yes  Comments:  Chaitanya Pruitt antibiotics                          Zaid Bowser MD      10/18/21 8:34 AM  BILLING INFORMATION:                                                                                                    Procedure:       CPT Code Modifier  With Kami Padilla, [address] [place] [address]                            H&P AT:    PCP  ___XX___               URGENT CARE_________       Otilia Bowser          LEFT  TOTAL SHOULDER REPLACEMENT                           1950                         PHYSICIANS ORDERS    HEIGHT:   6'2             WEIGHT:   260    ALLERGIES:Sulfa antibiotics          SUR-17                   JET  ____________________________________________________________________  PRE-OP ORDERS:  CBC WITH DIFFERENTIAL                                                TYPE AND SCREEN                                                            HgB A1C                                                                               EKG                                                                                        NASAL CULUTRE MRSA  UAR/if positive repeat UAR on admission  BMP  Albumin and Prealbumin  VITAMIN D LEVELS  COAG PROFILE  PT/OT EVAL AND TEACHING  INSTRUCT PT TO STOP ALL NSAIDS, ASPIRIN, BLOOD THINNERS 7 DAYS PRIOR SURGERY  DAY OF SURGERY  CEFAZOLIN 2 GM IVPB; IF PATIENT WEIGHS > 80 KG AND SERUM CREATININE <2.5 mg/dl, GIVE 2 GM DOSE WITHIN 1 HOUR OF INCISION.   IF THE PRE-OP NASAL CULTURE FOR MRSA WAS POSITIVE:   REPEAT NASAL SWAB ON ADMISSION AND ADMINISTER VANCOMYCIN 15 mg x kg, REDUCE THE DOSE OF VANCOMYCIN  MG IVPB IF PT < 55 KG OR SERUM CREATININE > 2mg/dl;also to get Cefazolin 2 GM or wt based  All patients will receive preop Cefazolin 2 GM or wt based  MOBIC 7.5 MG ORALLY  DAY OF SURGERY  Roxicodone 10MG  ORALLY DAY OF SURGERY  Tranexamic acid 1951 mg 2 hours prior to surgery  Type and screen          OTHER ORDERS:     PHYSICIAN SIGNATURE:     10/18/21 8:34 AM   __________________________DATE:

## 2021-10-19 NOTE — PROGRESS NOTES
This patient is here in follow-up for ongoing pain and dysfunction in their knees. There x-rays done previously showed joint space narrowing subchondral sclerosis with osteophyte formation. They currently have had relief with injections of cortisone, anti-inflammatories and a home exercise program. There are here with increased pain over the last few days with swelling and dysfunction. On examination this is a well-developed patient in no acute distress. They are alert and oriented ×3. They walk without antalgia or any significant varus or valgus deformity. They have crepitus during flexion and extension in the patellofemoral joint. They have a negative Lachman and a negative Harriett. There are good distal pulses and good dorsiflexion and plantarflexion strength present    My impression is bilateral knee osteoarthritis    After a discussion of the multiple options, they consented to a cortisone shot. 1 ml of 40mg/ml Kenalog and 2 ml's of 0.25%Marcaine were injected into both the right and the left knee joint spaces. The leg was slightly flexed and injected just lateral to the patella tendon to under the patella. This was done with sterile technique and they tolerated it well. During today's visit, there was approximately 20 minutes of face-to-face discussion in regards to the patient's current condition and treatment options. More than 50 % of the time was counseling and coordination of care. I went through a good stretch and strengthening exercise program. They understand that at some point, they will need a knee replacement. And they will follow up with us on a when necessary basis over the next 3-6 months        In regards to his left shoulder. He has a established osteoarthritis through the glenohumeral joint with osteophyte formation like a good severe deformity.   We have tried injections in the past which helped for short period of time he is done exercises anti-inflammatories behavior modification and still has significant soreness and pain. Dr. Dr. Pooja Gonzalez talk to the patient looked at the x-rays and together they consented and agreed to go up towards a left reverse total shoulder. There is complete disclosure the risk and benefits and rationale of treatment.   He will be scheduled sometime in the near future

## 2021-10-28 ENCOUNTER — HOSPITAL ENCOUNTER (OUTPATIENT)
Dept: CT IMAGING | Age: 71
Discharge: HOME | End: 2021-10-28
Payer: MEDICARE

## 2021-10-28 DIAGNOSIS — M19.012 ARTHRITIS OF LEFT SHOULDER REGION: ICD-10-CM

## 2021-10-28 PROCEDURE — 73200 CT UPPER EXTREMITY W/O DYE: CPT

## 2021-10-29 ENCOUNTER — TELEPHONE (OUTPATIENT)
Dept: ORTHOPEDIC SURGERY | Age: 71
End: 2021-10-29

## 2021-10-29 NOTE — TELEPHONE ENCOUNTER
INPATIENT Doesn't meet criteria.   AUTHOIRZATION IS FOR OBSERVATION/OUTPATIENT ONLY!!    Auth: # [id number]    Date: 11/17/2021 thru 12/17/2021  Type of SX:  Observation  Location: [facility]  CPT: 00121   DX Code: F41.353  SX area: Lt shoulder  Insurance: [facility]

## 2021-11-01 ENCOUNTER — HOSPITAL ENCOUNTER (OUTPATIENT)
Dept: PREADMISSION TESTING | Age: 71
Discharge: HOME | End: 2021-11-05
Payer: MEDICARE

## 2021-11-01 DIAGNOSIS — M19.012 ARTHRITIS OF LEFT SHOULDER REGION: ICD-10-CM

## 2021-11-01 LAB
ABO/RH: NORMAL
ALBUMIN SERPL-MCNC: 4.6 G/DL (ref 3.4–5)
ANION GAP SERPL CALCULATED.3IONS-SCNC: 14 MMOL/L (ref 3–16)
ANTIBODY SCREEN: NORMAL
APTT: 29.5 SEC (ref 26.2–38.6)
BASOPHILS ABSOLUTE: 0 K/UL (ref 0–0.2)
BASOPHILS RELATIVE PERCENT: 0.3 %
BILIRUBIN URINE: ABNORMAL
BLOOD, URINE: NEGATIVE
BUN BLDV-MCNC: 22 MG/DL (ref 7–20)
CALCIUM SERPL-MCNC: 9.8 MG/DL (ref 8.3–10.6)
CHLORIDE BLD-SCNC: 104 MMOL/L (ref 99–110)
CLARITY: CLEAR
CO2: 22 MMOL/L (ref 21–32)
COLOR: YELLOW
CREAT SERPL-MCNC: 1 MG/DL (ref 0.8–1.3)
EKG ATRIAL RATE: 88 BPM
EKG DIAGNOSIS: NORMAL
EKG P AXIS: -15 DEGREES
EKG P-R INTERVAL: 176 MS
EKG Q-T INTERVAL: 358 MS
EKG QRS DURATION: 102 MS
EKG QTC CALCULATION (BAZETT): 433 MS
EKG R AXIS: -45 DEGREES
EKG T AXIS: 26 DEGREES
EKG VENTRICULAR RATE: 88 BPM
EOSINOPHILS ABSOLUTE: 0.3 K/UL (ref 0–0.6)
EOSINOPHILS RELATIVE PERCENT: 2.9 %
EPITHELIAL CELLS, UA: 1 /HPF (ref 0–5)
ESTIMATED AVERAGE GLUCOSE: 151.3 MG/DL
GFR AFRICAN AMERICAN: >60
GFR NON-AFRICAN AMERICAN: >60
GLUCOSE BLD-MCNC: 176 MG/DL (ref 70–99)
GLUCOSE URINE: NEGATIVE MG/DL
HBA1C MFR BLD: 6.9 %
HCT VFR BLD CALC: 44.7 % (ref 40.5–52.5)
HEMOGLOBIN: 15 G/DL (ref 13.5–17.5)
HYALINE CASTS: 4 /LPF (ref 0–8)
INR BLD: 0.88 (ref 0.88–1.12)
KETONES, URINE: ABNORMAL MG/DL
LEUKOCYTE ESTERASE, URINE: NEGATIVE
LYMPHOCYTES ABSOLUTE: 2.2 K/UL (ref 1–5.1)
LYMPHOCYTES RELATIVE PERCENT: 19.2 %
MCH RBC QN AUTO: 31.4 PG (ref 26–34)
MCHC RBC AUTO-ENTMCNC: 33.6 G/DL (ref 31–36)
MCV RBC AUTO: 93.4 FL (ref 80–100)
MICROSCOPIC EXAMINATION: YES
MONOCYTES ABSOLUTE: 0.8 K/UL (ref 0–1.3)
MONOCYTES RELATIVE PERCENT: 7.3 %
MRSA SCREEN RT-PCR: NORMAL
NEUTROPHILS ABSOLUTE: 8.2 K/UL (ref 1.7–7.7)
NEUTROPHILS RELATIVE PERCENT: 70.3 %
NITRITE, URINE: NEGATIVE
PDW BLD-RTO: 14 % (ref 12.4–15.4)
PH UA: 5.5 (ref 5–8)
PLATELET # BLD: 246 K/UL (ref 135–450)
PMV BLD AUTO: 8.1 FL (ref 5–10.5)
POTASSIUM SERPL-SCNC: 4.2 MMOL/L (ref 3.5–5.1)
PREALBUMIN: 34.3 MG/DL (ref 20–40)
PROTEIN UA: 100 MG/DL
PROTHROMBIN TIME: 9.9 SEC (ref 9.9–12.7)
RBC # BLD: 4.78 M/UL (ref 4.2–5.9)
RBC UA: 2 /HPF (ref 0–4)
SODIUM BLD-SCNC: 140 MMOL/L (ref 136–145)
SPECIFIC GRAVITY UA: 1.03 (ref 1–1.03)
URINE REFLEX TO CULTURE: ABNORMAL
URINE TYPE: ABNORMAL
UROBILINOGEN, URINE: 1 E.U./DL
VITAMIN D 25-HYDROXY: 71.9 NG/ML
WBC # BLD: 11.7 K/UL (ref 4–11)
WBC UA: 2 /HPF (ref 0–5)

## 2021-11-01 PROCEDURE — 82040 ASSAY OF SERUM ALBUMIN: CPT

## 2021-11-01 PROCEDURE — 86900 BLOOD TYPING SEROLOGIC ABO: CPT

## 2021-11-01 PROCEDURE — 82306 VITAMIN D 25 HYDROXY: CPT

## 2021-11-01 PROCEDURE — 84134 ASSAY OF PREALBUMIN: CPT

## 2021-11-01 PROCEDURE — 85610 PROTHROMBIN TIME: CPT

## 2021-11-01 PROCEDURE — 87641 MR-STAPH DNA AMP PROBE: CPT

## 2021-11-01 PROCEDURE — 85025 COMPLETE CBC W/AUTO DIFF WBC: CPT

## 2021-11-01 PROCEDURE — 93005 ELECTROCARDIOGRAM TRACING: CPT

## 2021-11-01 PROCEDURE — 85730 THROMBOPLASTIN TIME PARTIAL: CPT

## 2021-11-01 PROCEDURE — 86901 BLOOD TYPING SEROLOGIC RH(D): CPT

## 2021-11-01 PROCEDURE — 83036 HEMOGLOBIN GLYCOSYLATED A1C: CPT

## 2021-11-01 PROCEDURE — 80048 BASIC METABOLIC PNL TOTAL CA: CPT

## 2021-11-01 PROCEDURE — 81001 URINALYSIS AUTO W/SCOPE: CPT

## 2021-11-01 PROCEDURE — 86850 RBC ANTIBODY SCREEN: CPT

## 2021-11-01 NOTE — PROGRESS NOTES
Patient attended JET class on 11/1/21. Patient verified surgery for Total shoulder replacement and received patient information and educational JET folder including education handouts on covid-19 restrictions, ERAS, hand hygiene and preventing constipation. Interviews completed by OT and PAT. Labs and Tests completed as ordered/necessary. Patient given handout instructions on Pre-operative Showering techniques and the use of anti-septic 3 days before surgery. Anti-septic bottle given to patient to take home. Patient states no further questions or concerns. Patient provided orthopedic office and nurse navigator contact information. Patient provided with codman and dressing exercises, home health care agency list and skilled nursing facility list.    DOS: 11/17/21  Dr Ainsley Matthews: home with wife Severino Olvera ;if applicable. Per Patient Will see/Saw PCP on 10/29/21.          Electronically signed by Ashlee Ayala RN on 11/1/2021 at 2:51 PM

## 2021-11-08 ENCOUNTER — OFFICE VISIT (OUTPATIENT)
Dept: ORTHOPEDIC SURGERY | Age: 71
End: 2021-11-08
Payer: MEDICARE

## 2021-11-08 VITALS — RESPIRATION RATE: 18 BRPM | HEIGHT: 74 IN | BODY MASS INDEX: 33.37 KG/M2 | WEIGHT: 260 LBS

## 2021-11-08 DIAGNOSIS — M19.012 ARTHRITIS OF LEFT SHOULDER REGION: Primary | ICD-10-CM

## 2021-11-08 PROCEDURE — G8417 CALC BMI ABV UP PARAM F/U: HCPCS | Performed by: ORTHOPAEDIC SURGERY

## 2021-11-08 PROCEDURE — 99214 OFFICE O/P EST MOD 30 MIN: CPT | Performed by: ORTHOPAEDIC SURGERY

## 2021-11-08 PROCEDURE — 1036F TOBACCO NON-USER: CPT | Performed by: ORTHOPAEDIC SURGERY

## 2021-11-08 PROCEDURE — 1123F ACP DISCUSS/DSCN MKR DOCD: CPT | Performed by: ORTHOPAEDIC SURGERY

## 2021-11-08 PROCEDURE — 4040F PNEUMOC VAC/ADMIN/RCVD: CPT | Performed by: ORTHOPAEDIC SURGERY

## 2021-11-08 PROCEDURE — G8484 FLU IMMUNIZE NO ADMIN: HCPCS | Performed by: ORTHOPAEDIC SURGERY

## 2021-11-08 PROCEDURE — 3017F COLORECTAL CA SCREEN DOC REV: CPT | Performed by: ORTHOPAEDIC SURGERY

## 2021-11-08 PROCEDURE — G8427 DOCREV CUR MEDS BY ELIG CLIN: HCPCS | Performed by: ORTHOPAEDIC SURGERY

## 2021-11-08 NOTE — PROGRESS NOTES
[facility]  Office Visit    Chief Complaint: Left shoulder pain    HPI:  Chikis Ordoñez is a 66 y.o. who is here in follow-up of left shoulder pain, had a planned left reverse total shoulder arthroplasty next week. He has a known history of osteoarthritis of the left shoulder. He has a recent anterior left total hip arthroplasty with Dr. Lydia Navarrete in November 2020. He reports left shoulder pain on a daily basis due to a history of osteoarthritis. He has had injections in these areas before and this is helped with the pain. Patient Active Problem List   Diagnosis    Elbow pain    Hyperlipidemia    HTN (hypertension)    Vitamin D deficiency    MTHFR mutation    Insulin resistance    Lumbosacral radiculopathy at L5    Pain of left hip joint    Arthritis of left hip    Spinal stenosis of lumbar region    Postlaminectomy syndrome    Status post lumbar spinal fusion    10/12/17 Removal fixation T11-L3 ; laminectomy L3-4 L4-L5; posterior spinal fusion L2-L3, L3-L4     Arthritis of left shoulder region    Primary osteoarthritis of right knee       ROS:  Constitutional: denies fever, chills, weight loss  MSK: denies pain in other joints, muscle aches  Neurological: denies numbness, tingling, weakness    Exam:  Appearance: sitting in exam room chair, appears to be in no acute distress, awake and alert  Resp: unlabored breathing on room air  Skin: warm, dry and intact with out erythema or significant increased temperature  Neuro: grossly intact both lower extremities. Intact sensation to light touch. Motor exam 4+ to 5/5 in all major motor groups. LUE: Examination of the shoulder shows no gross defects. Forward flexion is 150 degrees, external rotation 35 degrees, internal rotation to lumbar spine. Crepitus with active shoulder range of motion sensation is intact light touch. Brisk capillary refill. 2+ radial pulse. Strength is 5/5 in all muscle groups. Imaging:  Prior left shoulder radiographs were reviewed. He has bone-on-bone osteoarthritis with a large inferior humeral head osteophyte. Assessment:  Left glenohumeral osteoarthritis    Plan:  We discussed left reverse total shoulder arthroplasty at length today. We discussed treatment options and alternatives in detail. The patient would like to proceed with a shoulder arthroplasty. The patient understands the risks involved including but not limited to: Infection, vessel injury, nerve injury, implant loosening, need for revision surgery, dislocation, intraoperative fracture, and persistent pain. No guarantees were made. All questions were answered. I discussed with the patient the diagnosis in detail and answered all questions. The patient verbalized understanding of the plan as it has been described above and is in agreement. Plan for left reverse total shoulder arthroplasty. Total time spent on today's encounter was at least 32 minutes. This time included reviewing prior notes, radiographs, and lab results when available, reviewing history obtained by medical assistant, performing history and physical exam, reviewing tests/radiographs with the patient, counseling the patient, ordering medications or tests, documentation in the electronic health record, and coordination of care. This dictation was done with Dragon dictation and may contain mechanical errors related to translation.

## 2021-11-10 NOTE — CARE COORDINATION
DATE OF JET PHONE INTERVIEW:  11/10/21      HISTORY OF JOINT REPLACEMENT(S): Yes    L hip - 2020      DC TRANSPORTATION:  Wife, Garrett Dumont  Ph:  [phone]      STEPS INTO HOME: 2 steps    STEPS TO BATHROOM/BEDROOM:  13-15 steps to second floor bedroom. DME NEEDS:      Uses cane sometimes. HOME ASSISTANCE - WHO WILL BE STAYING WITH YOU AT HOME FOR FIRST SEVERAL DAYS? Garrett Dumont will be home. LENGTH OF STAY HAS BEEN DISCUSSED WITH THE PT, APPROPRIATE TO HIS/ HER PROCEDURE. PT HAS BEEN INFORMED THAT THEY WILL BE DISCHARGED WHEN THE PHYSICIAN DEEMS THEM MEDICALLY READY. MOST PATIENTS CAN EXPECT  TO BE IN THE HOSPITAL ONE NIGHT AS AN OBSERVATION ONLY, OR 1-2 DAYS AS AN ADMISSION FOR THOSE WITH MEDICAL HEALTH  ISSUES/COMPLICATIONS. CHOICES FOR HHC, DME VENDORS AND SKILLED/ REHAB FACILITIES PROVIDED TO PT DURING THIS INTERVIEW. HOME CARE CHOICE(S):  OP therapy - not set up yet. SNF/REHAB CHOICES (S): n/a    MEDS TO BEDS FROM [employer] RETAIL:     Yes    Contact information for case management provided to pt. Will follow with therapies and social service. Millicent Barbosa Sr.  Administrative Assist, Case Management  [phone]  Electronically signed by Millicent Barbosa on 11/10/2021 at 11:44 AM

## 2021-11-11 ENCOUNTER — PREP FOR PROCEDURE (OUTPATIENT)
Dept: ORTHOPEDICS UNIT | Age: 71
End: 2021-11-11

## 2021-11-11 DIAGNOSIS — M19.012 ARTHRITIS OF LEFT SHOULDER REGION: ICD-10-CM

## 2021-11-12 LAB — SARS-COV-2: NOT DETECTED

## 2021-11-15 ENCOUNTER — OFFICE VISIT (OUTPATIENT)
Dept: ORTHOPEDIC SURGERY | Age: 71
End: 2021-11-15
Payer: MEDICARE

## 2021-11-15 VITALS — HEIGHT: 74 IN | BODY MASS INDEX: 33.37 KG/M2 | WEIGHT: 260 LBS

## 2021-11-15 DIAGNOSIS — Z96.642 HISTORY OF LEFT HIP REPLACEMENT: Primary | ICD-10-CM

## 2021-11-15 PROCEDURE — G8427 DOCREV CUR MEDS BY ELIG CLIN: HCPCS | Performed by: ORTHOPAEDIC SURGERY

## 2021-11-15 PROCEDURE — 99213 OFFICE O/P EST LOW 20 MIN: CPT | Performed by: ORTHOPAEDIC SURGERY

## 2021-11-15 PROCEDURE — 3017F COLORECTAL CA SCREEN DOC REV: CPT | Performed by: ORTHOPAEDIC SURGERY

## 2021-11-15 PROCEDURE — G8484 FLU IMMUNIZE NO ADMIN: HCPCS | Performed by: ORTHOPAEDIC SURGERY

## 2021-11-15 PROCEDURE — 1123F ACP DISCUSS/DSCN MKR DOCD: CPT | Performed by: ORTHOPAEDIC SURGERY

## 2021-11-15 PROCEDURE — 4040F PNEUMOC VAC/ADMIN/RCVD: CPT | Performed by: ORTHOPAEDIC SURGERY

## 2021-11-15 PROCEDURE — 1036F TOBACCO NON-USER: CPT | Performed by: ORTHOPAEDIC SURGERY

## 2021-11-15 PROCEDURE — G8417 CALC BMI ABV UP PARAM F/U: HCPCS | Performed by: ORTHOPAEDIC SURGERY

## 2021-11-15 RX ORDER — MELOXICAM 7.5 MG/1
7.5 TABLET ORAL ONCE
Status: CANCELLED | OUTPATIENT
Start: 2021-11-15 | End: 2021-11-15

## 2021-11-15 RX ORDER — OXYCODONE HYDROCHLORIDE 10 MG/1
10 TABLET ORAL ONCE
Status: CANCELLED | OUTPATIENT
Start: 2021-11-15 | End: 2021-11-15

## 2021-11-15 RX ORDER — TRANEXAMIC ACID 650 1/1
1950 TABLET ORAL ONCE
Status: CANCELLED | OUTPATIENT
Start: 2021-11-15 | End: 2021-11-15

## 2021-11-15 NOTE — PROGRESS NOTES
[facility]  Office Visit    Chief Complaint: Follow-up s/p left total hip arthroplasty    HPI:  Kary Bob is a 66 y.o. who is here in follow-up of left total hip arthroplasty performed on November 3, 2020. He is here for his annual visit today. He is doing well overall. He does report occasional popping in the anterolateral portion of the hip. This is minimally painful. Patient Active Problem List   Diagnosis    Elbow pain    Hyperlipidemia    HTN (hypertension)    Vitamin D deficiency    MTHFR mutation    Insulin resistance    Lumbosacral radiculopathy at L5    Pain of left hip joint    Arthritis of left hip    Spinal stenosis of lumbar region    Postlaminectomy syndrome    Status post lumbar spinal fusion    10/12/17 Removal fixation T11-L3 ; laminectomy L3-4 L4-L5; posterior spinal fusion L2-L3, L3-L4     Arthritis of left shoulder region    Primary osteoarthritis of right knee       ROS:  Constitutional: denies fever, chills, weight loss  MSK: denies pain in other joints, muscle aches  Neurological: denies numbness, tingling, weakness    Exam:  Height 6' 2\" (1.88 m), weight 260 lb (117.9 kg). Appearance: sitting in exam room chair, appears to be in no acute distress, awake and alert  Resp: unlabored breathing on room air  Skin: warm, dry and intact with out erythema or significant increased temperature  Neuro: grossly intact both lower extremities. Intact sensation to light touch. Motor exam 4+ to 5/5 in all major motor groups. Left hip: Examination demonstrates negative logroll, mildly positive Stinchfield. There is brisk capillary refill. Strength is 5/5 in hamstrings, quads, hip flexors. Imaging:  AP pelvis, AP and frog-leg lateral views of the left hip were performed and interpreted a. There is a left total hip arthroplasty prosthesis in place. There are no signs of osteolysis, loosening, fracture, dislocation.   There is a small amount of heterotopic ossification in the anterolateral soft tissue. There is a retained foreign body from the time of surgery that is unchanged in position compared to prior radiographs. Assessment:  S/p left total hip arthroplasty    Plan:  He is doing well after left total hip arthroplasty. He will continue activity as tolerated. We discussed with the patient today the use of antibiotic prophylaxis prior to any dental procedures. We discussed that the antibiotic prophylaxis would be used to help prevent periprosthetic joint infections. If they were not offered antibiotics by the physician performing the procedure, they should contact our office that we may prescribe them antibiotics. Follow-up on an annual basis for the hip. Total time spent on today's encounter was at least 22 minutes. This time included reviewing prior notes, radiographs, and lab results when available, reviewing history obtained by medical assistant, performing history and physical exam, reviewing tests/radiographs with the patient, counseling the patient, ordering medications or tests, documentation in the electronic health record, and coordination of care. This dictation was done with Dragon dictation and may contain mechanical errors related to translation.

## 2021-11-15 NOTE — PROGRESS NOTES
PRE-OP INSTRUCTIONS     · Do not eat or drink anything after 12:00 midnight prior to surgery. This includes water, chewing gum, mints and ice chips. You may brush your teeth and gargle the morning of surgery but DO  NOT SWALLOW THE WATER. Take the following medications with a small sip of water on the morning of surgery: Follow your Doctor's pre procedure instructions regarding medications    · If you use an inhaler, please use it the morning of surgery and bring with you to hospital.    · You may be asked to stop blood thinners such as:  Coumadin, Plavix, Fragmin and lovenox. Please check with your doctor before stopping these or any other medications. · Aspirin, ibuprofen, advil and naproxen, any anti-inflammatory products should be stopped for a week prior to your surgery. · Do not smoke and do not drink any alcoholic beverages 24 hours prior to your surgery. · Please do not wear any jewelry or body piercings on the day of surgery. · Please wear something simple, loose fitting clothing to the hospital.  Do not wear any make-up(including eye make-up) or nail polish on your fingers and toes. · As part of our patient safety program to minimize surgical infections, we ask you to do the followin. Please notify your surgeon if you develop any illness between now                          and the day of your surgery. This includes a cough, cold, fever, sore                       throat, nausea, vomiting, diarrhea, etc.  Also please notify your                                  surgeon if you experience dizziness, shortness of breath or                       Blurred vision between now and the time of your surgery. 2.  Please notify your surgeon of any open or redden areas that may                        look infected                     3.  DO NOT shave your operative site 96 hours(four days) prior to                                  surgery. 4.  Shower the week before surgery with an antibacterial soap, such as                       dial, safeguard, etc.                         5.  Three(3) days prior to your surgery, cleanse the operative site with                          Hibiclens(anti-microbial soap). This soap may dry your skin, please                          do not apply any oils or lotions     · Please bring your insurance card and picture ID day of surgery    · If you have a living will or durable power of attorny. Please bring in a copy of your advanced directives. · If you have dentures, they will be removed before going to the OR, we will provide you with a container. If you wear contact lenses or glasses, they will be removes, please bring a case for them. · Have you seen your family doctor for a pre-op history and physical.      · Surgery scheduler will call you 48 hours prior to your surgery to notify you of the time of your surgery and the time you will need to be at hospital...patients are asked to arrive 21/2 hours prior to surgery. ·  Please call Pre-Admission testing if you have any further questions. [id number] [facility] testing phone number:  [phone]      Thank You for choosing [facility]!!    SAFETY FIRST. .call before you fall    Preoperative Screening for Elective Surgery/Invasive Procedures While COVID-19 present in the community     Have you tested positive or have been told to self-isolate for COVID-19 like symptoms within the past 28 days? no   Do you currently have any of the following symptoms?no  o Fever >100.0 F or 99.9 F in immunocompromised patients? o New onset cough, shortness of breath or difficulty breathing?  o New onset sore throat, myalgia (muscle aches and pains), headache, loss of taste/smell or diarrhea?  Have you had a potential exposure to COVID-19 within the past 14 days by:no  o Close contact with a confirmed case?   o Close contact with a healthcare worker, first responder or essential infrastructure worker [employer], [employer], gas station, public utilities or transportation)? o Do you reside in a congregate setting such as; skilled nursing facility, adult home, correctional facility, homeless shelter or other institutional setting?  o Have you had recent travel to a known COVID-19 hotspot? Indicate if the patient has a positive screen by answering yes to one or more of the above questions. Patients who test positive or screen positive prior to surgery or on the day of surgery should be evaluated in conjunction with the surgeon/proceduralist/anesthesiologist to determine the urgency of the procedure. C-Difficile admission screening and protocol:  * Admitted with diarrhea?no   *Prior history of C-Diff. In last 3 months? no  *Antibiotic use in the past 6-8 weeks?no . *Prior hospitalization or nursing home in the last month? no

## 2021-11-16 ENCOUNTER — ANESTHESIA EVENT (OUTPATIENT)
Dept: OPERATING ROOM | Age: 71
End: 2021-11-16
Payer: MEDICARE

## 2021-11-17 ENCOUNTER — HOSPITAL ENCOUNTER (OUTPATIENT)
Age: 71
Setting detail: OBSERVATION
Discharge: HOME HEALTH | End: 2021-11-18
Attending: ORTHOPAEDIC SURGERY | Admitting: ORTHOPAEDIC SURGERY
Payer: MEDICARE

## 2021-11-17 ENCOUNTER — ANESTHESIA (OUTPATIENT)
Dept: OPERATING ROOM | Age: 71
End: 2021-11-17
Payer: MEDICARE

## 2021-11-17 ENCOUNTER — APPOINTMENT (OUTPATIENT)
Dept: GENERAL RADIOLOGY | Age: 71
End: 2021-11-17
Attending: ORTHOPAEDIC SURGERY
Payer: MEDICARE

## 2021-11-17 VITALS
OXYGEN SATURATION: 92 % | SYSTOLIC BLOOD PRESSURE: 182 MMHG | RESPIRATION RATE: 1 BRPM | TEMPERATURE: 97.2 F | DIASTOLIC BLOOD PRESSURE: 88 MMHG

## 2021-11-17 DIAGNOSIS — M19.112 OSTEOARTHRITIS OF LEFT SHOULDER DUE TO ROTATOR CUFF INJURY: Primary | ICD-10-CM

## 2021-11-17 DIAGNOSIS — S46.002S OSTEOARTHRITIS OF LEFT SHOULDER DUE TO ROTATOR CUFF INJURY: Primary | ICD-10-CM

## 2021-11-17 DIAGNOSIS — M19.012 ARTHRITIS OF LEFT SHOULDER REGION: ICD-10-CM

## 2021-11-17 LAB
ABO/RH: NORMAL
ANTIBODY SCREEN: NORMAL
GLUCOSE BLD-MCNC: 137 MG/DL (ref 70–99)
GLUCOSE BLD-MCNC: 139 MG/DL (ref 70–99)
GLUCOSE BLD-MCNC: 153 MG/DL (ref 70–99)
GLUCOSE BLD-MCNC: 180 MG/DL (ref 70–99)
PERFORMED ON: ABNORMAL

## 2021-11-17 PROCEDURE — G0378 HOSPITAL OBSERVATION PER HR: HCPCS

## 2021-11-17 PROCEDURE — 7100000001 HC PACU RECOVERY - ADDTL 15 MIN: Performed by: ORTHOPAEDIC SURGERY

## 2021-11-17 PROCEDURE — 3600000015 HC SURGERY LEVEL 5 ADDTL 15MIN: Performed by: ORTHOPAEDIC SURGERY

## 2021-11-17 PROCEDURE — 86901 BLOOD TYPING SEROLOGIC RH(D): CPT

## 2021-11-17 PROCEDURE — 23472 RECONSTRUCT SHOULDER JOINT: CPT | Performed by: ORTHOPAEDIC SURGERY

## 2021-11-17 PROCEDURE — 2580000003 HC RX 258: Performed by: ORTHOPAEDIC SURGERY

## 2021-11-17 PROCEDURE — 6360000002 HC RX W HCPCS: Performed by: ORTHOPAEDIC SURGERY

## 2021-11-17 PROCEDURE — 6360000002 HC RX W HCPCS: Performed by: NURSE ANESTHETIST, CERTIFIED REGISTERED

## 2021-11-17 PROCEDURE — 94150 VITAL CAPACITY TEST: CPT

## 2021-11-17 PROCEDURE — 3700000001 HC ADD 15 MINUTES (ANESTHESIA): Performed by: ORTHOPAEDIC SURGERY

## 2021-11-17 PROCEDURE — 2720000010 HC SURG SUPPLY STERILE: Performed by: ORTHOPAEDIC SURGERY

## 2021-11-17 PROCEDURE — 6370000000 HC RX 637 (ALT 250 FOR IP): Performed by: ORTHOPAEDIC SURGERY

## 2021-11-17 PROCEDURE — 23472 RECONSTRUCT SHOULDER JOINT: CPT | Performed by: PHYSICIAN ASSISTANT

## 2021-11-17 PROCEDURE — 2500000003 HC RX 250 WO HCPCS: Performed by: NURSE ANESTHETIST, CERTIFIED REGISTERED

## 2021-11-17 PROCEDURE — 73020 X-RAY EXAM OF SHOULDER: CPT

## 2021-11-17 PROCEDURE — 86900 BLOOD TYPING SEROLOGIC ABO: CPT

## 2021-11-17 PROCEDURE — 2709999900 HC NON-CHARGEABLE SUPPLY: Performed by: ORTHOPAEDIC SURGERY

## 2021-11-17 PROCEDURE — 2580000003 HC RX 258: Performed by: ANESTHESIOLOGY

## 2021-11-17 PROCEDURE — 64415 NJX AA&/STRD BRCH PLXS IMG: CPT | Performed by: ANESTHESIOLOGY

## 2021-11-17 PROCEDURE — 88311 DECALCIFY TISSUE: CPT

## 2021-11-17 PROCEDURE — 86850 RBC ANTIBODY SCREEN: CPT

## 2021-11-17 PROCEDURE — 3700000000 HC ANESTHESIA ATTENDED CARE: Performed by: ORTHOPAEDIC SURGERY

## 2021-11-17 PROCEDURE — L3650 SO 8 ABD RESTRAINT PRE OTS: HCPCS | Performed by: ORTHOPAEDIC SURGERY

## 2021-11-17 PROCEDURE — 94760 N-INVAS EAR/PLS OXIMETRY 1: CPT

## 2021-11-17 PROCEDURE — 3209999900 FLUORO FOR SURGICAL PROCEDURES

## 2021-11-17 PROCEDURE — 2500000003 HC RX 250 WO HCPCS: Performed by: ANESTHESIOLOGY

## 2021-11-17 PROCEDURE — 7100000000 HC PACU RECOVERY - FIRST 15 MIN: Performed by: ORTHOPAEDIC SURGERY

## 2021-11-17 PROCEDURE — C1776 JOINT DEVICE (IMPLANTABLE): HCPCS | Performed by: ORTHOPAEDIC SURGERY

## 2021-11-17 PROCEDURE — 88304 TISSUE EXAM BY PATHOLOGIST: CPT

## 2021-11-17 PROCEDURE — 3600000005 HC SURGERY LEVEL 5 BASE: Performed by: ORTHOPAEDIC SURGERY

## 2021-11-17 DEVICE — SPHERE GLEN DIA39MM STD REVERSED AEQUALIS PERFORMA: Type: IMPLANTABLE DEVICE | Site: SHOULDER | Status: FUNCTIONAL

## 2021-11-17 DEVICE — IMPLANTABLE DEVICE: Type: IMPLANTABLE DEVICE | Site: SHOULDER | Status: FUNCTIONAL

## 2021-11-17 DEVICE — SCREW BONE L30MM DIA5MM TI ST FULL THRD PERIPH FOR GLEN: Type: IMPLANTABLE DEVICE | Site: SHOULDER | Status: FUNCTIONAL

## 2021-11-17 DEVICE — BASEPLATE GLEN DIA25MM STD REVERSED AEQUALIS PERFORM: Type: IMPLANTABLE DEVICE | Site: SHOULDER | Status: FUNCTIONAL

## 2021-11-17 DEVICE — SCREW BONE L35MM DIA6.5MM TI ST FULL THRD CTRL FOR GLEN: Type: IMPLANTABLE DEVICE | Site: SHOULDER | Status: FUNCTIONAL

## 2021-11-17 DEVICE — TRAY HUM THK+0MM 3.5MM OFFSET SHLDR HI REVERSED AEQUALIS: Type: IMPLANTABLE DEVICE | Site: SHOULDER | Status: FUNCTIONAL

## 2021-11-17 DEVICE — SCREW BONE L34MM DIA5MM TI ST FULL THRD PERIPH FOR GLEN: Type: IMPLANTABLE DEVICE | Site: SHOULDER | Status: FUNCTIONAL

## 2021-11-17 RX ORDER — MELOXICAM 7.5 MG/1
7.5 TABLET ORAL DAILY
Qty: 5 TABLET | Refills: 0 | Status: SHIPPED | OUTPATIENT
Start: 2021-11-18 | End: 2021-11-23

## 2021-11-17 RX ORDER — SODIUM CHLORIDE 9 MG/ML
INJECTION, SOLUTION INTRAVENOUS CONTINUOUS
Status: DISCONTINUED | OUTPATIENT
Start: 2021-11-17 | End: 2021-11-17

## 2021-11-17 RX ORDER — OXYCODONE HYDROCHLORIDE 5 MG/1
5-10 TABLET ORAL EVERY 6 HOURS PRN
Qty: 40 TABLET | Refills: 0 | Status: SHIPPED | OUTPATIENT
Start: 2021-11-17 | End: 2021-11-22

## 2021-11-17 RX ORDER — SODIUM CHLORIDE 9 MG/ML
25 INJECTION, SOLUTION INTRAVENOUS PRN
Status: DISCONTINUED | OUTPATIENT
Start: 2021-11-17 | End: 2021-11-17 | Stop reason: HOSPADM

## 2021-11-17 RX ORDER — DEXTROSE MONOHYDRATE 50 MG/ML
100 INJECTION, SOLUTION INTRAVENOUS PRN
Status: DISCONTINUED | OUTPATIENT
Start: 2021-11-17 | End: 2021-11-18 | Stop reason: HOSPADM

## 2021-11-17 RX ORDER — ASPIRIN 81 MG/1
81 TABLET ORAL 2 TIMES DAILY
Status: DISCONTINUED | OUTPATIENT
Start: 2021-11-17 | End: 2021-11-18 | Stop reason: HOSPADM

## 2021-11-17 RX ORDER — MEPERIDINE HYDROCHLORIDE 25 MG/ML
12.5 INJECTION INTRAMUSCULAR; INTRAVENOUS; SUBCUTANEOUS EVERY 5 MIN PRN
Status: DISCONTINUED | OUTPATIENT
Start: 2021-11-17 | End: 2021-11-17 | Stop reason: HOSPADM

## 2021-11-17 RX ORDER — SUCCINYLCHOLINE/SOD CL,ISO/PF 200MG/10ML
SYRINGE (ML) INTRAVENOUS PRN
Status: DISCONTINUED | OUTPATIENT
Start: 2021-11-17 | End: 2021-11-17 | Stop reason: SDUPTHER

## 2021-11-17 RX ORDER — ONDANSETRON 4 MG/1
4 TABLET, ORALLY DISINTEGRATING ORAL EVERY 8 HOURS PRN
Status: DISCONTINUED | OUTPATIENT
Start: 2021-11-17 | End: 2021-11-18 | Stop reason: HOSPADM

## 2021-11-17 RX ORDER — POLYETHYLENE GLYCOL 3350 17 G/17G
17 POWDER, FOR SOLUTION ORAL DAILY PRN
Status: DISCONTINUED | OUTPATIENT
Start: 2021-11-17 | End: 2021-11-18 | Stop reason: HOSPADM

## 2021-11-17 RX ORDER — PREGABALIN 75 MG/1
75 CAPSULE ORAL 2 TIMES DAILY
Status: DISCONTINUED | OUTPATIENT
Start: 2021-11-17 | End: 2021-11-18 | Stop reason: HOSPADM

## 2021-11-17 RX ORDER — ONDANSETRON 2 MG/ML
INJECTION INTRAMUSCULAR; INTRAVENOUS PRN
Status: DISCONTINUED | OUTPATIENT
Start: 2021-11-17 | End: 2021-11-17 | Stop reason: SDUPTHER

## 2021-11-17 RX ORDER — SODIUM CHLORIDE 0.9 % (FLUSH) 0.9 %
5-40 SYRINGE (ML) INJECTION EVERY 12 HOURS SCHEDULED
Status: DISCONTINUED | OUTPATIENT
Start: 2021-11-17 | End: 2021-11-17 | Stop reason: HOSPADM

## 2021-11-17 RX ORDER — OXYCODONE HYDROCHLORIDE 10 MG/1
10 TABLET ORAL PRN
Status: DISCONTINUED | OUTPATIENT
Start: 2021-11-17 | End: 2021-11-17 | Stop reason: HOSPADM

## 2021-11-17 RX ORDER — SODIUM CHLORIDE 450 MG/100ML
INJECTION, SOLUTION INTRAVENOUS CONTINUOUS
Status: DISCONTINUED | OUTPATIENT
Start: 2021-11-17 | End: 2021-11-18 | Stop reason: HOSPADM

## 2021-11-17 RX ORDER — PROPOFOL 10 MG/ML
INJECTION, EMULSION INTRAVENOUS PRN
Status: DISCONTINUED | OUTPATIENT
Start: 2021-11-17 | End: 2021-11-17 | Stop reason: SDUPTHER

## 2021-11-17 RX ORDER — ROCURONIUM BROMIDE 10 MG/ML
INJECTION, SOLUTION INTRAVENOUS PRN
Status: DISCONTINUED | OUTPATIENT
Start: 2021-11-17 | End: 2021-11-17 | Stop reason: SDUPTHER

## 2021-11-17 RX ORDER — FENTANYL CITRATE 50 UG/ML
INJECTION, SOLUTION INTRAMUSCULAR; INTRAVENOUS PRN
Status: DISCONTINUED | OUTPATIENT
Start: 2021-11-17 | End: 2021-11-17 | Stop reason: SDUPTHER

## 2021-11-17 RX ORDER — SODIUM CHLORIDE 0.9 % (FLUSH) 0.9 %
5-40 SYRINGE (ML) INJECTION PRN
Status: DISCONTINUED | OUTPATIENT
Start: 2021-11-17 | End: 2021-11-17 | Stop reason: HOSPADM

## 2021-11-17 RX ORDER — DEXTROSE MONOHYDRATE 25 G/50ML
12.5 INJECTION, SOLUTION INTRAVENOUS PRN
Status: DISCONTINUED | OUTPATIENT
Start: 2021-11-17 | End: 2021-11-18 | Stop reason: HOSPADM

## 2021-11-17 RX ORDER — SODIUM CHLORIDE 0.9 % (FLUSH) 0.9 %
5-40 SYRINGE (ML) INJECTION EVERY 12 HOURS SCHEDULED
Status: DISCONTINUED | OUTPATIENT
Start: 2021-11-17 | End: 2021-11-18 | Stop reason: HOSPADM

## 2021-11-17 RX ORDER — MORPHINE SULFATE 2 MG/ML
2 INJECTION, SOLUTION INTRAMUSCULAR; INTRAVENOUS EVERY 5 MIN PRN
Status: DISCONTINUED | OUTPATIENT
Start: 2021-11-17 | End: 2021-11-17 | Stop reason: HOSPADM

## 2021-11-17 RX ORDER — OXYCODONE HYDROCHLORIDE 5 MG/1
5 TABLET ORAL PRN
Status: DISCONTINUED | OUTPATIENT
Start: 2021-11-17 | End: 2021-11-17 | Stop reason: HOSPADM

## 2021-11-17 RX ORDER — INSULIN GLARGINE 100 [IU]/ML
0.25 INJECTION, SOLUTION SUBCUTANEOUS NIGHTLY
Status: DISCONTINUED | OUTPATIENT
Start: 2021-11-17 | End: 2021-11-18 | Stop reason: HOSPADM

## 2021-11-17 RX ORDER — FENTANYL CITRATE 50 UG/ML
25 INJECTION, SOLUTION INTRAMUSCULAR; INTRAVENOUS EVERY 5 MIN PRN
Status: DISCONTINUED | OUTPATIENT
Start: 2021-11-17 | End: 2021-11-17 | Stop reason: HOSPADM

## 2021-11-17 RX ORDER — OXYCODONE HYDROCHLORIDE 10 MG/1
10 TABLET ORAL ONCE
Status: COMPLETED | OUTPATIENT
Start: 2021-11-17 | End: 2021-11-17

## 2021-11-17 RX ORDER — ACETAMINOPHEN 325 MG/1
650 TABLET ORAL EVERY 6 HOURS
Status: DISCONTINUED | OUTPATIENT
Start: 2021-11-17 | End: 2021-11-18 | Stop reason: HOSPADM

## 2021-11-17 RX ORDER — NICOTINE POLACRILEX 4 MG
15 LOZENGE BUCCAL PRN
Status: DISCONTINUED | OUTPATIENT
Start: 2021-11-17 | End: 2021-11-18 | Stop reason: HOSPADM

## 2021-11-17 RX ORDER — MELOXICAM 7.5 MG/1
7.5 TABLET ORAL ONCE
Status: COMPLETED | OUTPATIENT
Start: 2021-11-17 | End: 2021-11-17

## 2021-11-17 RX ORDER — LISINOPRIL 20 MG/1
20 TABLET ORAL NIGHTLY
Status: DISCONTINUED | OUTPATIENT
Start: 2021-11-17 | End: 2021-11-18 | Stop reason: HOSPADM

## 2021-11-17 RX ORDER — OXYCODONE HYDROCHLORIDE 10 MG/1
10 TABLET ORAL EVERY 4 HOURS PRN
Status: DISCONTINUED | OUTPATIENT
Start: 2021-11-17 | End: 2021-11-18 | Stop reason: HOSPADM

## 2021-11-17 RX ORDER — ONDANSETRON 2 MG/ML
4 INJECTION INTRAMUSCULAR; INTRAVENOUS
Status: DISCONTINUED | OUTPATIENT
Start: 2021-11-17 | End: 2021-11-17 | Stop reason: HOSPADM

## 2021-11-17 RX ORDER — PREGABALIN 75 MG/1
75 CAPSULE ORAL 2 TIMES DAILY
Qty: 28 CAPSULE | Refills: 0 | Status: SHIPPED | OUTPATIENT
Start: 2021-11-17 | End: 2021-12-01

## 2021-11-17 RX ORDER — MIDAZOLAM HYDROCHLORIDE 1 MG/ML
INJECTION INTRAMUSCULAR; INTRAVENOUS PRN
Status: DISCONTINUED | OUTPATIENT
Start: 2021-11-17 | End: 2021-11-17 | Stop reason: SDUPTHER

## 2021-11-17 RX ORDER — MELOXICAM 7.5 MG/1
7.5 TABLET ORAL DAILY
Status: DISCONTINUED | OUTPATIENT
Start: 2021-11-18 | End: 2021-11-18 | Stop reason: HOSPADM

## 2021-11-17 RX ORDER — TRANEXAMIC ACID 650 1/1
1950 TABLET ORAL ONCE
Status: COMPLETED | OUTPATIENT
Start: 2021-11-17 | End: 2021-11-17

## 2021-11-17 RX ORDER — BUPIVACAINE HYDROCHLORIDE 2.5 MG/ML
INJECTION, SOLUTION EPIDURAL; INFILTRATION; INTRACAUDAL
Status: COMPLETED | OUTPATIENT
Start: 2021-11-17 | End: 2021-11-17

## 2021-11-17 RX ORDER — MORPHINE SULFATE 2 MG/ML
2 INJECTION, SOLUTION INTRAMUSCULAR; INTRAVENOUS
Status: DISCONTINUED | OUTPATIENT
Start: 2021-11-17 | End: 2021-11-18 | Stop reason: HOSPADM

## 2021-11-17 RX ORDER — MORPHINE SULFATE 2 MG/ML
1 INJECTION, SOLUTION INTRAMUSCULAR; INTRAVENOUS EVERY 5 MIN PRN
Status: DISCONTINUED | OUTPATIENT
Start: 2021-11-17 | End: 2021-11-17 | Stop reason: HOSPADM

## 2021-11-17 RX ORDER — SODIUM CHLORIDE 9 MG/ML
25 INJECTION, SOLUTION INTRAVENOUS PRN
Status: DISCONTINUED | OUTPATIENT
Start: 2021-11-17 | End: 2021-11-18 | Stop reason: HOSPADM

## 2021-11-17 RX ORDER — KETOROLAC TROMETHAMINE 30 MG/ML
15 INJECTION, SOLUTION INTRAMUSCULAR; INTRAVENOUS PRN
Status: ACTIVE | OUTPATIENT
Start: 2021-11-17 | End: 2021-11-17

## 2021-11-17 RX ORDER — FENTANYL CITRATE 50 UG/ML
50 INJECTION, SOLUTION INTRAMUSCULAR; INTRAVENOUS EVERY 5 MIN PRN
Status: DISCONTINUED | OUTPATIENT
Start: 2021-11-17 | End: 2021-11-17 | Stop reason: HOSPADM

## 2021-11-17 RX ORDER — MORPHINE SULFATE 4 MG/ML
4 INJECTION, SOLUTION INTRAMUSCULAR; INTRAVENOUS
Status: DISCONTINUED | OUTPATIENT
Start: 2021-11-17 | End: 2021-11-18 | Stop reason: HOSPADM

## 2021-11-17 RX ORDER — OXYCODONE HYDROCHLORIDE 5 MG/1
5 TABLET ORAL EVERY 4 HOURS PRN
Status: DISCONTINUED | OUTPATIENT
Start: 2021-11-17 | End: 2021-11-18 | Stop reason: HOSPADM

## 2021-11-17 RX ORDER — LIDOCAINE HYDROCHLORIDE 20 MG/ML
INJECTION, SOLUTION EPIDURAL; INFILTRATION; INTRACAUDAL; PERINEURAL PRN
Status: DISCONTINUED | OUTPATIENT
Start: 2021-11-17 | End: 2021-11-17 | Stop reason: SDUPTHER

## 2021-11-17 RX ORDER — HYDROXYZINE HYDROCHLORIDE 10 MG/1
10 TABLET, FILM COATED ORAL EVERY 8 HOURS PRN
Status: DISCONTINUED | OUTPATIENT
Start: 2021-11-17 | End: 2021-11-18 | Stop reason: HOSPADM

## 2021-11-17 RX ORDER — SODIUM CHLORIDE 0.9 % (FLUSH) 0.9 %
5-40 SYRINGE (ML) INJECTION PRN
Status: DISCONTINUED | OUTPATIENT
Start: 2021-11-17 | End: 2021-11-18 | Stop reason: HOSPADM

## 2021-11-17 RX ORDER — ASPIRIN 81 MG/1
81 TABLET ORAL 2 TIMES DAILY
Qty: 60 TABLET | Refills: 0 | Status: SHIPPED | OUTPATIENT
Start: 2021-11-17 | End: 2021-12-17

## 2021-11-17 RX ORDER — ONDANSETRON 2 MG/ML
4 INJECTION INTRAMUSCULAR; INTRAVENOUS EVERY 6 HOURS PRN
Status: DISCONTINUED | OUTPATIENT
Start: 2021-11-17 | End: 2021-11-18 | Stop reason: HOSPADM

## 2021-11-17 RX ORDER — ROSUVASTATIN CALCIUM 40 MG/1
40 TABLET, COATED ORAL NIGHTLY
Status: DISCONTINUED | OUTPATIENT
Start: 2021-11-17 | End: 2021-11-18 | Stop reason: HOSPADM

## 2021-11-17 RX ADMIN — CEFAZOLIN SODIUM 2000 MG: 10 INJECTION, POWDER, FOR SOLUTION INTRAVENOUS at 11:30

## 2021-11-17 RX ADMIN — SODIUM CHLORIDE: 9 INJECTION, SOLUTION INTRAVENOUS at 12:58

## 2021-11-17 RX ADMIN — OXYCODONE HYDROCHLORIDE 10 MG: 10 TABLET ORAL at 22:28

## 2021-11-17 RX ADMIN — MIDAZOLAM 2 MG: 1 INJECTION INTRAMUSCULAR; INTRAVENOUS at 11:10

## 2021-11-17 RX ADMIN — LISINOPRIL 20 MG: 20 TABLET ORAL at 20:27

## 2021-11-17 RX ADMIN — ONDANSETRON 4 MG: 2 INJECTION INTRAMUSCULAR; INTRAVENOUS at 12:45

## 2021-11-17 RX ADMIN — ROCURONIUM BROMIDE 45 MG: 10 INJECTION INTRAVENOUS at 11:43

## 2021-11-17 RX ADMIN — SODIUM CHLORIDE: 9 INJECTION, SOLUTION INTRAVENOUS at 09:38

## 2021-11-17 RX ADMIN — PROPOFOL 150 MG: 10 INJECTION, EMULSION INTRAVENOUS at 11:35

## 2021-11-17 RX ADMIN — FENTANYL CITRATE 100 MCG: 50 INJECTION INTRAMUSCULAR; INTRAVENOUS at 11:10

## 2021-11-17 RX ADMIN — OXYCODONE HYDROCHLORIDE 10 MG: 10 TABLET ORAL at 09:39

## 2021-11-17 RX ADMIN — CEFAZOLIN 2000 MG: 10 INJECTION, POWDER, FOR SOLUTION INTRAVENOUS at 17:25

## 2021-11-17 RX ADMIN — ACETAMINOPHEN 650 MG: 325 TABLET ORAL at 20:27

## 2021-11-17 RX ADMIN — ROSUVASTATIN CALCIUM 40 MG: 40 TABLET, FILM COATED ORAL at 20:27

## 2021-11-17 RX ADMIN — ACETAMINOPHEN 650 MG: 325 TABLET ORAL at 15:37

## 2021-11-17 RX ADMIN — SUGAMMADEX 200 MG: 100 INJECTION, SOLUTION INTRAVENOUS at 12:56

## 2021-11-17 RX ADMIN — BUPIVACAINE HYDROCHLORIDE 20 ML: 2.5 INJECTION, SOLUTION EPIDURAL; INFILTRATION; INTRACAUDAL at 10:58

## 2021-11-17 RX ADMIN — OXYCODONE 5 MG: 5 TABLET ORAL at 18:19

## 2021-11-17 RX ADMIN — LIDOCAINE HYDROCHLORIDE 60 MG: 20 INJECTION, SOLUTION EPIDURAL; INFILTRATION; INTRACAUDAL; PERINEURAL at 11:35

## 2021-11-17 RX ADMIN — PREGABALIN 75 MG: 75 CAPSULE ORAL at 20:27

## 2021-11-17 RX ADMIN — ASPIRIN 81 MG: 81 TABLET, COATED ORAL at 22:28

## 2021-11-17 RX ADMIN — TRANEXAMIC ACID 1950 MG: 650 TABLET ORAL at 09:39

## 2021-11-17 RX ADMIN — MELOXICAM 7.5 MG: 7.5 TABLET ORAL at 09:39

## 2021-11-17 RX ADMIN — Medication 140 MG: at 11:36

## 2021-11-17 RX ADMIN — ROCURONIUM BROMIDE 5 MG: 10 INJECTION INTRAVENOUS at 11:35

## 2021-11-17 RX ADMIN — SODIUM CHLORIDE: 4.5 INJECTION, SOLUTION INTRAVENOUS at 15:39

## 2021-11-17 ASSESSMENT — PULMONARY FUNCTION TESTS
PIF_VALUE: 18
PIF_VALUE: 1
PIF_VALUE: 30
PIF_VALUE: 3
PIF_VALUE: 18
PIF_VALUE: 4
PIF_VALUE: 18
PIF_VALUE: 19
PIF_VALUE: 18
PIF_VALUE: 14
PIF_VALUE: 18
PIF_VALUE: 15
PIF_VALUE: 18
PIF_VALUE: 0
PIF_VALUE: 18
PIF_VALUE: 18
PIF_VALUE: 15
PIF_VALUE: 18
PIF_VALUE: 7
PIF_VALUE: 20
PIF_VALUE: 18
PIF_VALUE: 18
PIF_VALUE: 17
PIF_VALUE: 18
PIF_VALUE: 1
PIF_VALUE: 18
PIF_VALUE: 17
PIF_VALUE: 18
PIF_VALUE: 1
PIF_VALUE: 18
PIF_VALUE: 2
PIF_VALUE: 16
PIF_VALUE: 18
PIF_VALUE: 14
PIF_VALUE: 18
PIF_VALUE: 18
PIF_VALUE: 6
PIF_VALUE: 3
PIF_VALUE: 18
PIF_VALUE: 18
PIF_VALUE: 15
PIF_VALUE: 18
PIF_VALUE: 19
PIF_VALUE: 18
PIF_VALUE: 18
PIF_VALUE: 19
PIF_VALUE: 18
PIF_VALUE: 19
PIF_VALUE: 18
PIF_VALUE: 18
PIF_VALUE: 0
PIF_VALUE: 18
PIF_VALUE: 16
PIF_VALUE: 18
PIF_VALUE: 15
PIF_VALUE: 15
PIF_VALUE: 30
PIF_VALUE: 15
PIF_VALUE: 2
PIF_VALUE: 18
PIF_VALUE: 13
PIF_VALUE: 18
PIF_VALUE: 1
PIF_VALUE: 18
PIF_VALUE: 18
PIF_VALUE: 15
PIF_VALUE: 18
PIF_VALUE: 0
PIF_VALUE: 3
PIF_VALUE: 14
PIF_VALUE: 18
PIF_VALUE: 6

## 2021-11-17 ASSESSMENT — PAIN - FUNCTIONAL ASSESSMENT
PAIN_FUNCTIONAL_ASSESSMENT: ACTIVITIES ARE NOT PREVENTED
PAIN_FUNCTIONAL_ASSESSMENT: 0-10
PAIN_FUNCTIONAL_ASSESSMENT: ACTIVITIES ARE NOT PREVENTED
PAIN_FUNCTIONAL_ASSESSMENT: ACTIVITIES ARE NOT PREVENTED
PAIN_FUNCTIONAL_ASSESSMENT: PREVENTS OR INTERFERES SOME ACTIVE ACTIVITIES AND ADLS
PAIN_FUNCTIONAL_ASSESSMENT: ACTIVITIES ARE NOT PREVENTED

## 2021-11-17 ASSESSMENT — PAIN DESCRIPTION - PAIN TYPE
TYPE: SURGICAL PAIN
TYPE: ACUTE PAIN;SURGICAL PAIN
TYPE: SURGICAL PAIN
TYPE: SURGICAL PAIN
TYPE: ACUTE PAIN;SURGICAL PAIN
TYPE: ACUTE PAIN
TYPE: ACUTE PAIN

## 2021-11-17 ASSESSMENT — PAIN SCALES - GENERAL
PAINLEVEL_OUTOF10: 4
PAINLEVEL_OUTOF10: 4
PAINLEVEL_OUTOF10: 3
PAINLEVEL_OUTOF10: 4
PAINLEVEL_OUTOF10: 0
PAINLEVEL_OUTOF10: 2
PAINLEVEL_OUTOF10: 0
PAINLEVEL_OUTOF10: 4
PAINLEVEL_OUTOF10: 7
PAINLEVEL_OUTOF10: 0

## 2021-11-17 ASSESSMENT — PAIN DESCRIPTION - FREQUENCY
FREQUENCY: INTERMITTENT
FREQUENCY: INTERMITTENT
FREQUENCY: CONTINUOUS
FREQUENCY: INTERMITTENT
FREQUENCY: CONTINUOUS

## 2021-11-17 ASSESSMENT — PAIN DESCRIPTION - DESCRIPTORS
DESCRIPTORS: ACHING
DESCRIPTORS: HEADACHE
DESCRIPTORS: ACHING
DESCRIPTORS: HEADACHE
DESCRIPTORS: ACHING

## 2021-11-17 ASSESSMENT — PAIN DESCRIPTION - ONSET
ONSET: ON-GOING

## 2021-11-17 ASSESSMENT — PAIN DESCRIPTION - PROGRESSION
CLINICAL_PROGRESSION: NOT CHANGED
CLINICAL_PROGRESSION: GRADUALLY WORSENING
CLINICAL_PROGRESSION: GRADUALLY IMPROVING
CLINICAL_PROGRESSION: NOT CHANGED

## 2021-11-17 ASSESSMENT — PAIN DESCRIPTION - ORIENTATION
ORIENTATION: LEFT

## 2021-11-17 ASSESSMENT — PAIN DESCRIPTION - LOCATION
LOCATION: HEAD
LOCATION: HEAD
LOCATION: ARM
LOCATION: SHOULDER
LOCATION: SHOULDER
LOCATION: ARM
LOCATION: SHOULDER

## 2021-11-17 ASSESSMENT — ENCOUNTER SYMPTOMS: SHORTNESS OF BREATH: 0

## 2021-11-17 NOTE — PROGRESS NOTES
Met with patient and family wife at bedside, patient is alert and oriented x4. discussed role of nurse navigator. Reviewed reasons to call with questions or concerns, importance of TEDS, Incentive spirometer, pain medication, and occupational therapy. 2/4 bed rails up, bed in lowest position, fall precautions in place, call light within reach. Pulses present bilaterally +2 radial, no drainage or odor noted at surgical dressing left anterior shoulder. dry Dressing clean, dry, and intact. Ice in place. Neurovascular checks performed and WNLs with exception of some decreased sensation to left hand, patient denies tingling. DC Plan: home with wife. wife to transport patient.   DME needs:denies, already has a surinder Dunne  Orthopedic Nurse Navigator  Phone number: [phone]    Future Appointments   Date Time Provider Abelardo Santiago   11/29/2021  9:00 AM MD Porfirio Varela     Electronically signed by Kandy Downing RN on 11/17/2021 at 2:43 PM

## 2021-11-17 NOTE — PROGRESS NOTES
Patient A&O. Pt given PRN pain medication for moderate headache rated 4/10. Call light within reach, able to make needs knows, fall precautions in place. IV fluid infusing. Will continue to monitor.  Electronically signed by Lisa Lagunas RN on 11/17/2021 at 6:12 PM

## 2021-11-17 NOTE — ANESTHESIA PRE PROCEDURE
Department of Anesthesiology  Preprocedure Note       Name:  Ilan Carroll   Age:  66 y.o.  :  1950                                          MRN:  [id number]         Date:  2021      Surgeon: Marilu Query):  Jake Zapien MD    Procedure: Procedure(s):  LEFT REVERSE TOTAL SHOULDER REPLACEMENT    Medications prior to admission:   Prior to Admission medications    Medication Sig Start Date End Date Taking? Authorizing Provider   Cyanocobalamin (VITAMIN B 12 PO) Take 1 tablet by mouth daily    Historical Provider, MD   fosinopril (MONOPRIL) 10 MG tablet Take 20 mg by mouth nightly    Historical Provider, MD   Cholecalciferol (VITAMIN D3) 50 MCG (2000) CAPS Take by mouth daily    Historical Provider, MD   aspirin EC 81 MG EC tablet Take 1 tablet by mouth 2 times daily Take for DVT blood clot prophylaxis. Please avoid missing doses. 11/3/20 12/3/20  Valerio Loya APRN - CNP   metFORMIN (GLUCOPHAGE) 500 MG tablet Take 1 tablet by mouth 2 times daily (with meals)  Patient taking differently: Take 500 mg by mouth daily (with breakfast) Patient states he takes once a day 10/14/17   Jennifer Landers PA-C   CRESTOR 40 MG tablet TAKE ONE TABLET BY MOUTH EVERY EVENING 16   Trinity Escalante PA-C   Thiamine HCl (VITAMIN B-1 PO) Take by mouth daily Vitamin B12 2/9/10   Historical Provider, MD       Current medications:    No current facility-administered medications for this visit. No current outpatient medications on file.      Facility-Administered Medications Ordered in Other Visits   Medication Dose Route Frequency Provider Last Rate Last Admin    0.9 % sodium chloride infusion   IntraVENous Continuous Mary Hobson MD        sodium chloride flush 0.9 % injection 5-40 mL  5-40 mL IntraVENous 2 times per day Mary Hobson MD        sodium chloride flush 0.9 % injection 5-40 mL  5-40 mL IntraVENous PRN Mary Hobson MD        0.9 % sodium chloride infusion  25 mL IntraVENous PRN Agatha Holland MD        ceFAZolin (ANCEF) 2000 mg in dextrose 5 % 100 mL IVPB  2,000 mg IntraVENous Once Connor Wilson MD        meloxicam EST[facility]ANIASABRINA STRONG [facility]) tablet 7.5 mg  7.5 mg Oral Once Connor Wilson MD        oxyCODONE HCl (OXY-IR) immediate release tablet 10 mg  10 mg Oral Once Connor Wilson MD        tranexamic acid (LYSTEDA) tablet 1,950 mg  1,950 mg Oral Once Connor Wilson MD           Allergies:     Allergies   Allergen Reactions    Sulfa Antibiotics Rash and Hives       Problem List:    Patient Active Problem List   Diagnosis Code    Elbow pain M25.529    Hyperlipidemia E78.5    HTN (hypertension) I10    Vitamin D deficiency E55.9    MTHFR mutation Z15.89    Insulin resistance E88.81    Lumbosacral radiculopathy at L5 M54.17    Pain of left hip joint M25.552    Arthritis of left hip M16.12    Spinal stenosis of lumbar region M48.061    Postlaminectomy syndrome M96.1    Status post lumbar spinal fusion Z98.1    10/12/17 Removal fixation T11-L3 ; laminectomy L3-4 L4-L5; posterior spinal fusion L2-L3, L3-L4  M21.372    Arthritis of left shoulder region M19.012    Primary osteoarthritis of right knee M17.11       Past Medical History:        Diagnosis Date    HBP (high blood pressure)     Hyperlipidemia 11/10/2011    Insulin resistance     Lumbosacral radiculopathy at L5 9/14/2017    MDRO (multiple drug resistant organisms) resistance     tx successfully 2 yr ago    QI on CPAP     Primary osteoarthritis of right knee 9/10/2020       Past Surgical History:        Procedure Laterality Date    ANKLE SURGERY  30 years ago    left    BACK SURGERY  30 years ago    BACK SURGERY  10/12/2017    removal of agustin hardware T11- L3,4, spinal fusion L2-L5    KNEE SURGERY  5 years ago    right    TOTAL HIP ARTHROPLASTY Left 11/3/2020    LEFT ANTERIOR TOTAL HIP REPLACEMENT WITH C-ARM performed by Boston Delong MD at [address] [place] [address] History:    Social History     Tobacco Use    Smoking status: Never Smoker    Smokeless tobacco: Never Used   Substance Use Topics    Alcohol use: Yes     Comment: occ. Counseling given: Not Answered      Vital Signs (Current): There were no vitals filed for this visit. BP Readings from Last 3 Encounters:   11/17/21 (!) 122/91   04/12/21 133/79   11/04/20 115/66       NPO Status:   >8hrs                                                                                BMI:   Wt Readings from Last 3 Encounters:   11/17/21 260 lb 12.9 oz (118.3 kg)   11/15/21 260 lb (117.9 kg)   11/08/21 260 lb (117.9 kg)     There is no height or weight on file to calculate BMI.    CBC:   Lab Results   Component Value Date    WBC 11.7 11/01/2021    RBC 4.78 11/01/2021    HGB 15.0 11/01/2021    HCT 44.7 11/01/2021    MCV 93.4 11/01/2021    RDW 14.0 11/01/2021     11/01/2021       CMP:   Lab Results   Component Value Date     11/01/2021    K 4.2 11/01/2021     11/01/2021    CO2 22 11/01/2021    BUN 22 11/01/2021    CREATININE 1.0 11/01/2021    GFRAA >60 11/01/2021    GFRAA 105 03/15/2012    AGRATIO 1.3 10/05/2017    LABGLOM >60 11/01/2021    GLUCOSE 176 11/01/2021    GLUCOSE 123 03/15/2012    PROT 7.2 10/05/2017    PROT 7.2 12/19/2012    CALCIUM 9.8 11/01/2021    BILITOT 0.6 10/05/2017    ALKPHOS 52 10/05/2017    AST 13 10/05/2017    ALT 19 10/05/2017       POC Tests: No results for input(s): POCGLU, POCNA, POCK, POCCL, POCBUN, POCHEMO, POCHCT in the last 72 hours.     Coags:   Lab Results   Component Value Date    PROTIME 9.9 11/01/2021    INR 0.88 11/01/2021    APTT 29.5 11/01/2021       HCG (If Applicable): No results found for: PREGTESTUR, PREGSERUM, HCG, HCGQUANT     ABGs: No results found for: PHART, PO2ART, NCY5TLI, ECK2GAB, BEART, H4ZFKEKJ     Type & Screen (If Applicable):  No results found for: LABABO, LABRH    Drug/Infectious Status (If Applicable):  No results found for: HIV, HEPCAB    COVID-19 Screening (If Applicable):   Lab Results   Component Value Date    COVID19 Not Detected 11/11/2021    COVID19 Not Detected 10/28/2020         Anesthesia Evaluation  Patient summary reviewed no history of anesthetic complications:   Airway: Mallampati: II  TM distance: >3 FB   Neck ROM: full  Mouth opening: > = 3 FB Dental:      Comment: crowns    Pulmonary: breath sounds clear to auscultation  (+) sleep apnea: on CPAP,      (-) COPD, asthma, shortness of breath and recent URI                           Cardiovascular:    (+) hypertension:, hyperlipidemia    (-) valvular problems/murmurs, past MI, CAD, CABG/stent, dysrhythmias,  angina,  CHF, orthopnea, murmur and no pulmonary hypertension    ECG reviewed  Rhythm: regular  Rate: normal                    Neuro/Psych:   (+) neuromuscular disease:,    (-) seizures, TIA, CVA, headaches and psychiatric history           GI/Hepatic/Renal:        (-) GERD (takes tums occ), PUD, hepatitis, liver disease, no renal disease and bowel prep       Endo/Other:    (+) : arthritis: OA., .    (-) diabetes mellitus, hypothyroidism, hyperthyroidism               Abdominal:             Vascular: Other Findings:               Anesthesia Plan      general and regional     ASA 3     (Interscalene nerve block)  Induction: intravenous. MIPS: Postoperative opioids intended and Prophylactic antiemetics administered. Anesthetic plan and risks discussed with patient and spouse. Plan discussed with CRNA. This pre-anesthesia assessment may be used as a history and physical.    DOS STAFF ADDENDUM:    Pt seen and examined, chart reviewed (including anesthesia, drug and allergy history). No interval changes to history and physical examination. Anesthetic plan, risks, benefits, alternatives, and personnel involved discussed with patient. Patient verbalized an understanding and agrees to proceed.       Karen Castro MD  November 17, 2021  9:24 BHARGAV Bella MD   11/17/2021

## 2021-11-17 NOTE — CARE COORDINATION
11/17/21. Met with patient and spouse to discuss discharge planning. Plan remains for patient to discharge to home with spouse, Curry Beard, [phone] who will be with him 24/7. No home care services or DME needed. Will go to outpatient therapy when seen and authorized by surgeon after discharge. Discharge prescriptions to be filled by [facility].      Aftab CHOWDARY RN  Case Management  [phone]    Electronically signed by Aftab Ramirez RN on 11/17/2021 at 3:45 PM

## 2021-11-17 NOTE — PROGRESS NOTES
Pt admitted to floor from PACU. IV fluid infusing. Pt rates pain at a 4/10. Will cont to monitor and reassess.  Electronically signed by Sol Cleveland RN on 11/17/2021 at 2:00 PM

## 2021-11-17 NOTE — H&P
Update History & Physical    The patient's History and Physical of October 29, 2021 was reviewed with the patient and I examined the patient. There was no change. The surgical site was confirmed by the patient and me. Plan: The risks, benefits, expected outcome, and alternative to the recommended procedure have been discussed with the patient. Patient understands and wants to proceed with the procedure.      Electronically signed by Cassie Bhat MD on 11/17/2021 at 10:48 AM

## 2021-11-17 NOTE — OP NOTE
Patient: Oliverio Womack  YOB: 1950  MRN: [id number]    DATE OF PROCEDURE: 11/17/2021      PREOPERATIVE DIAGNOSES:  Left shoulder rotator cuff tear arthropathy     POSTOPERATIVE DIAGNOSES:   Left shoulder rotator cuff tear arthropathy     OPERATION PERFORMED:  Left reverse total shoulder arthroplasty with biceps tenodesis. SURGEON:  Melissa Andrews MD     ASSISTANTS:  MINOR Quinteros     BLOOD LOSS: 100 mL     IMPLANTS:  Tornier Shoulder System  25 mm standard baseplate with 35 mm central screw and 2 peripheral screws  39 mm standard glenosphere  Size 4B long flex stem  +0 mm high offset tray with 39 + 9 mm B poly     INDICATIONS:  The patient is a 66 y.o. male with chronic left shoulder pain due to rotator cuff tear arthropathy. The patient comes now for left shoulder replacement. The patient has been treated with anti-inflammatories, physical therapy and intra-articular steroid injections; none of these provided any long-term relief. We discussed treatment options and alternatives in detail. The patient understands the risks involved including but not limited to: Infection, vessel injury, nerve injury, implant loosening, need for revision surgery, dislocation, intraoperative fracture, and persistent pain. Informed consent for surgery was signed by the patient. OPERATIVE PROCEDURE: The patient was seen in the preoperative holding area where the site of surgery was marked and informed consent was confirmed. The patient was brought back to the operating room by OR personnel. General anesthesia was administered. The patient was placed in a beach chair position. The left upper extremity was then prepped and draped in a standard and sterile fashion. A final and formal timeout was then performed which confirmed the correct patient, correct position, and correct site of surgery. IV antibiotics were administered within 1 hour of the skin incision. An anterior incision was made.  Skin and glenosphere then was placed over the [facility] GUIDO taper and screwed into place. Again, the C-arm was brought in, confirmed good position of the glenoid. The proximal humerus then was re-exposed, and then the high offset baseplate was placed on #4 rasp with a 39 + 6 mm poly. All the trial components were removed and placed on the back table for reconstruction. Next, with the arm held in a bit of external rotation, the #4B long flex uncemented stem was hammered into place at 20 degrees of retroversion to the proximal humerus. The 39 + 9 mm B poly was then snapped over the high offset baseplate and the shoulder was reduced. It was taken through a stable range of motion. The wound was irrigated out and hemostasis was obtained. C-arm was brought in and confirmed good position of the hardware and stable overall orientation and configuration. After the biceps tenodesis was secured, the wound was irrigated out and hemostasis was obtained. It was also bathed with aqueous iodine. The wound was closed in layers. The patient was placed in a sling. A dressing was placed, and the patient was brought to recovery room in stable condition. MINOR Crews was essential in patient positioning, surgical assistance during the arthroplasty, and in wound closure.     Cassie Bhat MD  11/17/2021

## 2021-11-17 NOTE — ANESTHESIA POSTPROCEDURE EVALUATION
Department of Anesthesiology  Postprocedure Note    Patient: Tacho Dupont  MRN: [id number]  YOB: 1950  Date of evaluation: 11/17/2021  Time:  1:50 PM     Procedure Summary     Date: 11/17/21 Room / Location: Doctor Tara Duvall 01 / [facility]    Anesthesia Start: 1130 Anesthesia Stop: 2325    Procedure: LEFT REVERSE TOTAL SHOULDER REPLACEMENT (Left Shoulder) Diagnosis:       Arthritis of left shoulder region      (ARTHRITIS LEFT SHOULDER)    Surgeons: Mai Kayser, MD Responsible Provider: Lauro Smith MD    Anesthesia Type: general, regional ASA Status: 3          Anesthesia Type: general, regional    Bucky Phase I: Bucky Score: 10    Bucky Phase II:      Last vitals: Reviewed and per EMR flowsheets.        Anesthesia Post Evaluation    Patient location during evaluation: PACU  Level of consciousness: awake and alert  Airway patency: patent  Nausea & Vomiting: no nausea and no vomiting  Complications: no  Cardiovascular status: blood pressure returned to baseline  Respiratory status: acceptable  Hydration status: euvolemic  Comments: Postoperative Anesthesia Note    Name:    Tacho Dupont  MRN:      [id number]    Patient Vitals in the past 12 hrs:  11/17/21 1332, BP:(!) 143/76, Pulse:67, Resp:15, SpO2:93 %  11/17/21 1327, BP:(!) 145/89, Pulse:77, Resp:24, SpO2:94 %  11/17/21 1322, BP:(!) 144/88, Pulse:79, Resp:22, SpO2:93 %  11/17/21 1317, BP:(!) 142/89, Pulse:71, Resp:13, SpO2:93 %  11/17/21 1313, BP:(!) 149/84, Pulse:76, Resp:16, SpO2:93 %  11/17/21 1312, BP:(!) 157/90, Pulse:77, Resp:16, SpO2:93 %  11/17/21 1308, BP:(!) 157/90, Temp:96.7 °F (35.9 °C), Temp src:Temporal, Pulse:79, Resp:18, SpO2:93 %  11/17/21 0921, BP:(!) 122/91, Temp:97.2 °F (36.2 °C), Temp src:Temporal, Pulse:77, Resp:18, SpO2:96 %  11/17/21 0914, Height:6' 2\" (1.88 m), Weight:260 lb 12.9 oz (118.3 kg)     LABS:    CBC  Lab Results       Component                Value               Date/Time                  WBC 11. 7 (H)            11/01/2021 12:05 PM        HGB                      15.0                11/01/2021 12:05 PM        HCT                      44.7                11/01/2021 12:05 PM        PLT                      246                 11/01/2021 12:05 PM   RENAL  Lab Results       Component                Value               Date/Time                  NA                       140                 11/01/2021 12:05 PM        K                        4.2                 11/01/2021 12:05 PM        CL                       104                 11/01/2021 12:05 PM        CO2                      22                  11/01/2021 12:05 PM        BUN                      22 (H)              11/01/2021 12:05 PM        CREATININE               1.0                 11/01/2021 12:05 PM        GLUCOSE                  176 (H)             11/01/2021 12:05 PM        GLUCOSE                  123 (H)             03/15/2012 09:33 AM   COAGS  Lab Results       Component                Value               Date/Time                  PROTIME                  9.9                 11/01/2021 12:05 PM        INR                      0.88                11/01/2021 12:05 PM        APTT                     29.5                11/01/2021 12:05 PM     Intake & Output:  @89YGVZ@    Nausea & Vomiting:  No    Level of Consciousness:  Awake    Pain Assessment:  Adequate analgesia    Anesthesia Complications:  No apparent anesthetic complications    SUMMARY      Vital signs stable  OK to discharge from Stage I post anesthesia care.   Care transferred from Anesthesiology department on discharge from perioperative area

## 2021-11-17 NOTE — ANESTHESIA PROCEDURE NOTES
Peripheral Block    Patient location during procedure: pre-op  Staffing  Performed: anesthesiologist   Anesthesiologist: Hannah Lindsey MD  Preanesthetic Checklist  Completed: patient identified, IV checked, site marked, risks and benefits discussed, surgical consent, monitors and equipment checked, pre-op evaluation, timeout performed, anesthesia consent given, oxygen available and patient being monitored  Peripheral Block  Patient position: sitting  Prep: ChloraPrep  Patient monitoring: cardiac monitor, continuous pulse ox, frequent blood pressure checks and IV access  Block type: Brachial plexus  Laterality: left  Injection technique: single-shot  Guidance: ultrasound guided  Local infiltration: lidocaine  Infiltration strength: 1 %  Dose: 3 mL  Interscalene  Provider prep: mask and sterile gloves  Local infiltration: lidocaine  Needle  Needle gauge: 21 G  Needle length: 10 cm  Needle localization: ultrasound guidance  Needle insertion depth: 1.5 cm  Assessment  Injection assessment: negative aspiration for heme, no paresthesia on injection and local visualized surrounding nerve on ultrasound  Paresthesia pain: none  Slow fractionated injection: yes  Hemodynamics: stable  Additional Notes  Immediately prior to procedure a \"time out\" was called to verify the correct patient, allergies, laterality, procedure and equipment. Time out performed with Chaka VILLANUEVA    Local Anesthetic: 0.5 %  Bupivacaine   Amount: 20 ml  in 5 ml increments after negative aspiration each time. Anterior scalene and middle scalene muscles, upper, middle and lower trunks of the brachial plexus are identified, the tip of the needle and the spread of the local anesthetic around the brachial plexus are visualized. The Brachial plexus appeared to be anatomically normal and there were no abnormal pathologically findings seen.          Medications Administered  Bupivacaine (MARCAINE) PF injection 0.25%, 20 mL  Reason for block: post-op pain management and at surgeon's request

## 2021-11-17 NOTE — PROGRESS NOTES
Pharmacy Medication Reconciliation Note     List of medications patient is currently taking is complete. Source of information:   1. Patient  2.  EMR    Gris Pappas PharmD, BCPS  11/17/2021  2:48 PM

## 2021-11-17 NOTE — PROGRESS NOTES
Pt arrived to the PACU sleeping. VSS. Dressing dry and intact. No signs of pain at this time. Will continue to monitor.

## 2021-11-18 VITALS
WEIGHT: 264.33 LBS | OXYGEN SATURATION: 92 % | HEIGHT: 74 IN | BODY MASS INDEX: 33.92 KG/M2 | SYSTOLIC BLOOD PRESSURE: 105 MMHG | TEMPERATURE: 97.6 F | HEART RATE: 68 BPM | DIASTOLIC BLOOD PRESSURE: 70 MMHG | RESPIRATION RATE: 17 BRPM

## 2021-11-18 LAB
ANION GAP SERPL CALCULATED.3IONS-SCNC: 10 MMOL/L (ref 3–16)
BUN BLDV-MCNC: 22 MG/DL (ref 7–20)
CALCIUM SERPL-MCNC: 9 MG/DL (ref 8.3–10.6)
CHLORIDE BLD-SCNC: 100 MMOL/L (ref 99–110)
CO2: 26 MMOL/L (ref 21–32)
CREAT SERPL-MCNC: 1.1 MG/DL (ref 0.8–1.3)
GFR AFRICAN AMERICAN: >60
GFR NON-AFRICAN AMERICAN: >60
GLUCOSE BLD-MCNC: 172 MG/DL (ref 70–99)
GLUCOSE BLD-MCNC: 174 MG/DL (ref 70–99)
GLUCOSE BLD-MCNC: 186 MG/DL (ref 70–99)
PERFORMED ON: ABNORMAL
PERFORMED ON: ABNORMAL
POTASSIUM SERPL-SCNC: 4.9 MMOL/L (ref 3.5–5.1)
SODIUM BLD-SCNC: 136 MMOL/L (ref 136–145)

## 2021-11-18 PROCEDURE — 6360000002 HC RX W HCPCS: Performed by: ORTHOPAEDIC SURGERY

## 2021-11-18 PROCEDURE — G0378 HOSPITAL OBSERVATION PER HR: HCPCS

## 2021-11-18 PROCEDURE — 97165 OT EVAL LOW COMPLEX 30 MIN: CPT

## 2021-11-18 PROCEDURE — 97535 SELF CARE MNGMENT TRAINING: CPT

## 2021-11-18 PROCEDURE — 97110 THERAPEUTIC EXERCISES: CPT

## 2021-11-18 PROCEDURE — 36415 COLL VENOUS BLD VENIPUNCTURE: CPT

## 2021-11-18 PROCEDURE — 6370000000 HC RX 637 (ALT 250 FOR IP): Performed by: ORTHOPAEDIC SURGERY

## 2021-11-18 PROCEDURE — 80048 BASIC METABOLIC PNL TOTAL CA: CPT

## 2021-11-18 RX ADMIN — INSULIN LISPRO 9 UNITS: 100 INJECTION, SOLUTION INTRAVENOUS; SUBCUTANEOUS at 08:41

## 2021-11-18 RX ADMIN — CEFAZOLIN 2000 MG: 10 INJECTION, POWDER, FOR SOLUTION INTRAVENOUS at 02:38

## 2021-11-18 RX ADMIN — OXYCODONE HYDROCHLORIDE 10 MG: 10 TABLET ORAL at 06:27

## 2021-11-18 RX ADMIN — OXYCODONE HYDROCHLORIDE 10 MG: 10 TABLET ORAL at 10:45

## 2021-11-18 RX ADMIN — MELOXICAM 7.5 MG: 7.5 TABLET ORAL at 06:27

## 2021-11-18 RX ADMIN — ASPIRIN 81 MG: 81 TABLET, COATED ORAL at 08:40

## 2021-11-18 RX ADMIN — ACETAMINOPHEN 650 MG: 325 TABLET ORAL at 08:40

## 2021-11-18 RX ADMIN — INSULIN LISPRO 1 UNITS: 100 INJECTION, SOLUTION INTRAVENOUS; SUBCUTANEOUS at 08:41

## 2021-11-18 RX ADMIN — ACETAMINOPHEN 650 MG: 325 TABLET ORAL at 02:36

## 2021-11-18 RX ADMIN — OXYCODONE HYDROCHLORIDE 10 MG: 10 TABLET ORAL at 02:36

## 2021-11-18 RX ADMIN — PREGABALIN 75 MG: 75 CAPSULE ORAL at 08:40

## 2021-11-18 ASSESSMENT — PAIN DESCRIPTION - PAIN TYPE
TYPE: ACUTE PAIN;SURGICAL PAIN

## 2021-11-18 ASSESSMENT — PAIN DESCRIPTION - PROGRESSION
CLINICAL_PROGRESSION: NOT CHANGED
CLINICAL_PROGRESSION: GRADUALLY WORSENING
CLINICAL_PROGRESSION: NOT CHANGED

## 2021-11-18 ASSESSMENT — PAIN DESCRIPTION - FREQUENCY
FREQUENCY: CONTINUOUS

## 2021-11-18 ASSESSMENT — PAIN DESCRIPTION - ONSET
ONSET: ON-GOING

## 2021-11-18 ASSESSMENT — PAIN DESCRIPTION - DESCRIPTORS
DESCRIPTORS: ACHING;CONSTANT
DESCRIPTORS: CONSTANT

## 2021-11-18 ASSESSMENT — PAIN DESCRIPTION - ORIENTATION
ORIENTATION: LEFT

## 2021-11-18 ASSESSMENT — PAIN SCALES - GENERAL
PAINLEVEL_OUTOF10: 2
PAINLEVEL_OUTOF10: 7
PAINLEVEL_OUTOF10: 3
PAINLEVEL_OUTOF10: 0
PAINLEVEL_OUTOF10: 7
PAINLEVEL_OUTOF10: 0
PAINLEVEL_OUTOF10: 7

## 2021-11-18 ASSESSMENT — PAIN DESCRIPTION - LOCATION
LOCATION: ARM;SHOULDER
LOCATION: ARM
LOCATION: ARM;SHOULDER
LOCATION: ARM;SHOULDER
LOCATION: ARM

## 2021-11-18 NOTE — DISCHARGE INSTR - COC
Continuity of Care Form    Patient Name: Darion Luarent   :  1950  MRN:  [id number]    Admit date:  2021  Discharge date:  ***    Code Status Order: Full Code   Advance Directives:   Advance Care Flowsheet Documentation       Date/Time Healthcare Directive Type of Healthcare Directive Copy in [address] Agent's Name Healthcare Agent's Phone Number    11/15/21 1548 No, patient does not have an advance directive for healthcare treatment -- -- -- -- --            Admitting Physician:  Clarice Rubalcava MD  PCP: Alejandro Parra MD    Discharging Nurse: [facility] Unit/Room#: G3G-4003/3123-01  Discharging Unit Phone Number: ***    Emergency Contact:   Extended Emergency Contact Information  Primary Emergency Contact: Bhavana Cronin  Address: [address] [place] [address],[address] [facility] [address] Phone: [phone]  Relation: Spouse    Past Surgical History:  Past Surgical History:   Procedure Laterality Date    ANKLE SURGERY  30 years ago    left    BACK SURGERY  30 years ago    BACK SURGERY  10/12/2017    removal of agustin hardware T11- L3,4, spinal fusion L2-L5    KNEE SURGERY  5 years ago    right    SHOULDER SURGERY Left 2021    LEFT REVERSE TOTAL SHOULDER REPLACEMENT performed by Clarice Rubalcava MD at [address] [place] [address] Left 11/3/2020    LEFT ANTERIOR TOTAL HIP REPLACEMENT WITH C-ARM performed by Ashish Rothman MD at [place] [address]       Immunization History:   Immunization History   Administered Date(s) Administered    COVID-19, Pfizer, PF, 30mcg/0.3mL 2021, 2021, 2021    Influenza, MDCK Quadv, IM, PF (Flucelvax 2 yrs and older) 10/15/2017    Pneumococcal Conjugate 13-valent (Fnhhrkv14) 10/15/2017    Pneumococcal Polysaccharide (Tkpzyhabc50) 10/05/2015    Tdap (Boostrix, Adacel) 2008       Active Problems:  Patient Active Problem List   Diagnosis Code    Elbow pain M25.529    Hyperlipidemia E78.5 HTN (hypertension) I10    Vitamin D deficiency E55.9    MTHFR mutation Z15.89    Insulin resistance E88.81    Lumbosacral radiculopathy at L5 M54.17    Pain of left hip joint M25.552    Arthritis of left hip M16.12    Spinal stenosis of lumbar region M48.061    Postlaminectomy syndrome M96.1    Status post lumbar spinal fusion Z98.1    10/12/17 Removal fixation T11-L3 ; laminectomy L3-4 L4-L5; posterior spinal fusion L2-L3, L3-L4  M21.372    Arthritis of left shoulder region M19.012    Primary osteoarthritis of right knee M17.11    Osteoarthritis of left shoulder due to rotator cuff injury M19.112, S46.002S       Isolation/Infection:   Isolation            No Isolation          Patient Infection Status       Infection Onset Added Last Indicated Last Indicated By Review Planned Expiration Resolved Resolved By    None active    Resolved    COVID-19 (Rule Out) 11/11/21 11/11/21 11/11/21 COVID-19 (Ordered)   11/12/21 Rule-Out Test Resulted            Nurse Assessment:  Last Vital Signs: /70   Pulse 68   Temp 97.6 °F (36.4 °C) (Oral)   Resp 17   Ht 6' 2\" (1.88 m)   Wt 264 lb 5.3 oz (119.9 kg)   SpO2 92%   BMI 33.94 kg/m²     Last documented pain score (0-10 scale): Pain Level: 3  Last Weight:   Wt Readings from Last 1 Encounters:   11/18/21 264 lb 5.3 oz (119.9 kg)     Mental Status:  {IP PT MENTAL STATUS:52303}    IV Access:  { WILMER IV ACCESS:797972074}    Nursing Mobility/ADLs:  Walking   {P DME PIQF:302568374}  Transfer  {P DME THBU:554102744}  Bathing  {P DME HHFJ:847233557}  Dressing  {CHP DME YEPJ:890734577}  Toileting  {P DME OVDX:514820302}  Feeding  {P DME PEZU:376914217}  Med Admin  {P DME EHFW:093424405}  Med Delivery   { WILMER MED Delivery:088840552}    Wound Care Documentation and Therapy:  Incision 10/12/17 Back Mid (Active)   Number of days: 1497        Elimination:  Continence:    Bowel: {YES / KU:18460}  Bladder: {YES / FK:75566}  Urinary Catheter: {Urinary Catheter:437875668} Colostomy/Ileostomy/Ileal Conduit: {YES / FE:21066}       Date of Last BM: ***    Intake/Output Summary (Last 24 hours) at 2021 1041  Last data filed at 2021 0405  Gross per 24 hour   Intake 2310.96 ml   Output 200 ml   Net 2110.96 ml     I/O last 3 completed shifts: In: 7111 [P.O.:120;  I.V.:1990.9; IV Piggyback:200.1]  Out: 200 [Blood:200]    Safety Concerns:     [address] Safety Concerns:232214163}    Impairments/Disabilities:      [facility] Impairments/Disabilities:208448241}    Nutrition Therapy:  Current Nutrition Therapy:   [facility] Diet List:929828093}    Routes of Feeding: {CHP DME Other Feedings:337388847}  Liquids: {Slp liquid thickness:18998}  Daily Fluid Restriction: {CHP DME Yes amt example:108299718}  Last Modified Barium Swallow with Video (Video Swallowing Test): {Done Not Done EQWH:782447518}    Treatments at the Time of Hospital Discharge:   Respiratory Treatments: ***  Oxygen Therapy:  {Therapy; copd oxygen:25980}  Ventilator:    {[facility] Vent APTL:636036643}    Rehab Therapies: {THERAPEUTIC INTERVENTION:9140415095}  Weight Bearing Status/Restrictions: [address] [place] [address] Weight Bearin}  Other Medical Equipment (for information only, NOT a DME order):  {EQUIPMENT:730490406}  Other Treatments: ***    Patient's personal belongings (please select all that are sent with patient):  {[facility] DME Belongings:934063717}    RN SIGNATURE:  {Esignature:191924136}    CASE MANAGEMENT/SOCIAL WORK SECTION    Inpatient Status Date: ***    Readmission Risk Assessment Score:  Readmission Risk              Risk of Unplanned Readmission:  0           Discharging to Facility/ Agency   Name:   Address:  Phone:  Fax:    Dialysis Facility (if applicable)   Name:  Address:  Dialysis Schedule:  Phone:  Fax:    / signature: {Esignature:370876314}    PHYSICIAN SECTION    Prognosis: {Prognosis:3603936580}    Condition at Discharge: [facility] Patient Condition:243455368}    Rehab Potential (if transferring to Rehab): {Prognosis:7376104732}    Recommended Labs or Other Treatments After Discharge: ***    Physician Certification: I certify the above information and transfer of Parrish Krishna  is necessary for the continuing treatment of the diagnosis listed and that he requires {Admit to Appropriate Level of Care:86077} for {GREATER/LESS:062053492} 30 days.      Update Admission H&P: {CHP DME Changes in XDBOS:513577177}    PHYSICIAN SIGNATURE:  {Esignature:099206888}

## 2021-11-18 NOTE — PROGRESS NOTES
BS at 2000 noted to be 180. According to BS protocol if non fasting <180 they do not trigger. It also states if non fasting >180 to administer insulin ACHS. There was no indication of what to do if . Rechecked BS and noted to be 153, so no insulin was given.   Will recheck again in am.

## 2021-11-18 NOTE — PROGRESS NOTES
[facility] Orthopedic Surgery   Progress Note      S/P :  SUBJECTIVE  In recliner. Alert and oriented. . Pain is   described in left shoulder and with the intensity of mild. Pain is described as aching. OBJECTIVE              Physical                      VITALS:  /70   Pulse 68   Temp 97.6 °F (36.4 °C) (Oral)   Resp 17   Ht 6' 2\" (1.88 m)   Wt 264 lb 5.3 oz (119.9 kg)   SpO2 92%   BMI 33.94 kg/m²                     MUSCULOSKELETAL:  left foot NVI. Wiggles toes to command. Pedal pulses are palpable. NEUROLOGIC:                                  Sensory:  Touch:  Left Lower Extremity:  normal                                                 Surgical wound appears clean and dry left shoulder with Prineo dressing. Arm in sling. Ice pack on.      Data       CBC:   Lab Results   Component Value Date    WBC 11.7 11/01/2021    RBC 4.78 11/01/2021    HGB 15.0 11/01/2021    HCT 44.7 11/01/2021    MCV 93.4 11/01/2021    MCH 31.4 11/01/2021    MCHC 33.6 11/01/2021    RDW 14.0 11/01/2021     11/01/2021    MPV 8.1 11/01/2021        WBC:    Lab Results   Component Value Date    WBC 11.7 11/01/2021        Hemoglobin/Hematocrit:    Lab Results   Component Value Date    HGB 15.0 11/01/2021    HCT 44.7 11/01/2021        PT/INR:    Lab Results   Component Value Date    PROTIME 9.9 11/01/2021    INR 0.88 11/01/2021              Current Inpatient Medications             Current Facility-Administered Medications: rosuvastatin (CRESTOR) tablet 40 mg, 40 mg, Oral, Nightly  lisinopril (PRINIVIL;ZESTRIL) tablet 20 mg, 20 mg, Oral, Nightly  insulin glargine (LANTUS) injection vial 30 Units, 0.25 Units/kg, SubCUTAneous, Nightly  insulin lispro (HUMALOG) injection vial 9 Units, 0.08 Units/kg, SubCUTAneous, TID WC  insulin lispro (HUMALOG) injection vial 0-6 Units, 0-6 Units, SubCUTAneous, TID WC  insulin lispro (HUMALOG) injection vial 0-3 Units, 0-3 Units, SubCUTAneous, Nightly  glucose (GLUTOSE) 40 % oral gel 15 g, 15 g, Oral, PRN  dextrose 50 % IV solution, 12.5 g, IntraVENous, PRN  glucagon (rDNA) injection 1 mg, 1 mg, IntraMUSCular, PRN  dextrose 5 % solution, 100 mL/hr, IntraVENous, PRN  sodium chloride flush 0.9 % injection 5-40 mL, 5-40 mL, IntraVENous, 2 times per day  sodium chloride flush 0.9 % injection 5-40 mL, 5-40 mL, IntraVENous, PRN  0.9 % sodium chloride infusion, 25 mL, IntraVENous, PRN  acetaminophen (TYLENOL) tablet 650 mg, 650 mg, Oral, Q6H  ondansetron (ZOFRAN-ODT) disintegrating tablet 4 mg, 4 mg, Oral, Q8H PRN **OR** ondansetron (ZOFRAN) injection 4 mg, 4 mg, IntraVENous, Q6H PRN  0.45 % sodium chloride infusion, , IntraVENous, Continuous  oxyCODONE (ROXICODONE) immediate release tablet 5 mg, 5 mg, Oral, Q4H PRN **OR** oxyCODONE HCl (OXY-IR) immediate release tablet 10 mg, 10 mg, Oral, Q4H PRN  morphine (PF) injection 2 mg, 2 mg, IntraVENous, Q2H PRN **OR** morphine (PF) injection 4 mg, 4 mg, IntraVENous, Q2H PRN  hydrOXYzine (ATARAX) tablet 10 mg, 10 mg, Oral, Q8H PRN  polyethylene glycol (GLYCOLAX) packet 17 g, 17 g, Oral, Daily PRN  aspirin EC tablet 81 mg, 81 mg, Oral, BID  pregabalin (LYRICA) capsule 75 mg, 75 mg, Oral, BID  meloxicam (MOBIC) tablet 7.5 mg, 7.5 mg, Oral, Daily    ASSESSMENT AND PLAN    Post left reverse TSA, stable exam  DVT prophylaxis ordered, ASA 81mg twice at day for 30 days for DVT prophylaxis  PT OT for ADL's and ambulation as tolerated  Home today, Home exercises per handout.    IV or PO pain med as ordered      Hermes Lemus, APRN - CNP  11/18/2021  9:04 AM

## 2021-11-18 NOTE — PROGRESS NOTES
Data- discharge order received, pt verbalized agreement to discharge, disposition to previous residence, no needs for HHC/DME. Action- discharge instructions prepared and given to pt. And wife , pt verbalized understanding. Medication information packet given r/t NEW and/or CHANGED prescriptions emphasizing name/purpose/side effects, pt verbalized understanding. Discharge instruction summary: Diet- regular  Activity- up with assist , Primary Care Physician as follows: Carlos Enrique Nobles -[phone] f/u appointment advised to make in 2 weeks  immunizations reviewed and up to date , prescription medications filled at [address] . Inpatient surgical procedure precautions reviewed: reverse total shoulder. Response- Pt belongings gathered, IV removed. Disposition is home (no HHC/DME needs), transported with wife , taken to lobby via w/c w/ wife , no complications.

## 2021-11-18 NOTE — PROGRESS NOTES
Pt rested quietly overnight. Outer dressing removed from left shoulder this morning. Prineo intact with no drainage noted. Edges well approximated. 1+ edema noted to left upper arm. Radial pulses remain palpable. Arm still in a sling. Numbness to left arm is now resolved. Fresh ice pack applied to left shoulder. Medicated with PRN Oxycodone for pain. Denies any needs. Call light in reach. Will monitor.

## 2021-11-18 NOTE — PROGRESS NOTES
Pt AAO x4. N/o c/o pain in left arm, but is having a headache. Will give scheduled Tylenol shortly. Assessment completed and charted. Left shoulder dressing CDI. Arm in a sling. Radial pulses strong. Pt is still c/o numbness in left arm, but is able to wiggle fingers and has a moderate hand grasp. Cap refill brisk and extremities warm. IVF infusing without any difficulty. Pt is using IS. Denies any needs. Call light in reach. Will monitor.

## 2021-11-18 NOTE — PROGRESS NOTES
Clinical Pharmacy Note  Medication Counseling    Reviewed new medications started during hospital admission: aspirin, meloxicam, oycodone and pregabalin. . Indications and side effects were emphasized during counseling. All medication-related questions addressed. Patient verbalized understanding of education. Writen materials were provided for meloxicam, oxycodone and pregabalin. Should the patient express any additional questions or concerns regarding their medications, please do not hesitate to contact the pharmacy department. Patient/caregiver aware they may refuse medications during hospital stay. 20 minutes spent educating patient regarding medications.    JOAN Aceves [facility]  11/18/2021  8:35 AM

## 2021-11-18 NOTE — PLAN OF CARE
Problem: Activity:  Goal: Sleeping patterns will improve  Description: Sleeping patterns will improve  Outcome: Ongoing   Will provide a calm, peaceful environment and minimize interruptions to help facilitate sleep. Problem: Discharge Planning:  Goal: Discharged to appropriate level of care  Outcome: Ongoing   Pt is aware of DC plan to go home with homecare tomorrow. Problem: Mobility - Impaired:  Goal: Mobility will improve  Outcome: Ongoing   Pt able to ambulate with CGA x1. Will continue to encourage ambulation. Problem: Pain - Acute:  Goal: Pain level will decrease  Description: Pain level will decrease  Outcome: Ongoing     Problem: Skin Integrity:  Goal: Demonstration of wound healing without infection will improve  Outcome: Ongoing   Skin assessment completed every shift. Pt encouraged to turn/rotate every 2 hours. Assistance provided if pt unable to do so themselves. Problem: Falls - Risk of:  Goal: Will remain free from falls  Description: Will remain free from falls  Outcome: Ongoing  Goal: Absence of physical injury  Description: Absence of physical injury  Outcome: Ongoing   Fall risk assessment completed every shift. All precautions in place. Pt has call light within reach at all times. Room clear of clutter. Pt aware to call for assistance when getting up. Problem: Pain:  Goal: Pain level will decrease  Description: Pain level will decrease  Outcome: Ongoing  Goal: Control of acute pain  Description: Control of acute pain  Outcome: Ongoing  Goal: Control of chronic pain  Description: Control of chronic pain  Outcome: Ongoing   Pain/discomfort being managed with PRN and scheduled analgesics per MD orders. Pt able to express presence and absence of pain and rate pain appropriately using numerical scale.

## 2021-11-18 NOTE — PROGRESS NOTES
Occupational Therapy   Occupational Therapy Initial Assessment  Date: 2021   Patient Name: Jb Crenshaw  MRN: [id number]     : 1950    Date of Service: 2021    Discharge Recommendations:  24 hour supervision or assist  OT Equipment Recommendations  Equipment Needed: No    Jb Crenshaw scored a  on the AM-PAC ADL Inpatient form. At this time, no further OT is recommended upon discharge due to pt functioning near baseline with appropriate assist at home. Recommend patient returns to prior setting with prior services. Assessment   Performance deficits / Impairments: Decreased functional mobility ; Decreased balance; Decreased ADL status; Decreased ROM; Decreased high-level IADLs  Assessment: 67 yo male admitted  for L TSA. PMH: L KALA , OA. PTA, pt lives with wife who assists with IADLs, otherwise, pt independent with ADL, fxl mobility, and driving. Today, pt functioning below baseline most limited by LUE NWB. Pt and spouse educated on and provided handout for LUE NWB, no shoulder active movement, HEP (see exercises), optimal sleep positioning, sling management/proper alignment, and dressing technique. Pt able to complete bed mobility SUP. With SBA, pt completes LB dressing, toileting, sink side hand washing, standing pendulum exercises, tx and fxl mobility household distances. Pt able to complete UB dressing and sling management with Min A. Pt and spouse very attentive and verbalize and demonstrate understanding of education. Pt completes LUE exercises (see exercises) to prevent stiffness, decrease swelling and improve funciton. Cont acute OT to address above deficits and rec pt d/c home safely with spouse for 24 hr supervision/assist and OP therapy after follow up with MD  Prognosis: Good  Decision Making: Low Complexity  OT Education: OT Role; Transfer Training; Plan of Care; Precautions; ADL Adaptive Strategies;  Home Exercise Program  Patient Education: Pt and spouse educated on and provided handout for LUE NWB, no shoulder active movement, HEP (see exercises), optimal sleep positioning, sling management/proper alignment, importance of household distance mobility every hour, and dressing technique  REQUIRES OT FOLLOW UP: Yes  Activity Tolerance  Activity Tolerance: Patient Tolerated treatment well  Safety Devices  Safety Devices in place: Yes  Type of devices: Nurse notified; Gait belt; Call light within reach; Left in chair; Patient at risk for falls (RN present at [facility])         Patient Diagnosis(es): The primary encounter diagnosis was Osteoarthritis of left shoulder due to rotator cuff injury. A diagnosis of Arthritis of left shoulder region was also pertinent to this visit. has a past medical history of HBP (high blood pressure), Hyperlipidemia, Insulin resistance, Lumbosacral radiculopathy at L5, MDRO (multiple drug resistant organisms) resistance, QI on CPAP, and Primary osteoarthritis of right knee. has a past surgical history that includes knee surgery (5 years ago); Ankle surgery (30 years ago); back surgery (30 years ago); back surgery (10/12/2017); Total hip arthroplasty (Left, 11/3/2020); and shoulder surgery (Left, 11/17/2021). Restrictions  Restrictions/Precautions  Restrictions/Precautions: Weight Bearing, Fall Risk  Upper Extremity Weight Bearing Restrictions  Left Upper Extremity Weight Bearing: Non Weight Bearing    Subjective   General  Chart Reviewed: Yes  Patient assessed for rehabilitation services?: Yes  Additional Pertinent Hx: 67 yo male admitted 11/17 for L TSA. PMH: L KALA 2020, OA  Family / Caregiver Present: Yes (wife)  Referring Practitioner: Bean Leon MD  Diagnosis: L TSA  Subjective  Subjective: Pt resting in bed upon arrival and agreeable to OT eval. Pt reporting mild pain in L shoulder.   Patient Currently in Pain: Yes  Pain Assessment  Pain Assessment: Faces  Pain Level: 3  Response to Pain Intervention: Asleep with RR greater than 10  Vital Signs  Temp: 97.6 °F (36.4 °C)  Temp Source: Oral  Pulse: 68  Heart Rate Source: Monitor  Resp: 17  BP: 105/70  BP Location: Right Arm  MAP (mmHg): 82  Level of Consciousness: Alert (0)  MEWS Score: 1  Patient Currently in Pain: Yes  Oxygen Therapy  SpO2: 92 %  O2 Device: PAP (positive airway pressure)    Social/Functional History  Social/Functional History  Lives With: Spouse  Type of Home: House  Home Layout: Two level, Laundry in basement, 1/2 bath on main level, Bed/Bath upstairs (can sleep in recliner on main level)  Bathroom Shower/Tub: Tub/Shower unit, Doors, Shower chair with back  [employer]: Handicap height  Bathroom Equipment: Grab bars around toilet, Hand-held shower  Home Equipment: Rolling walker, Cane, Crutches  ADL Assistance: Independent  Homemaking Assistance: Independent (spouse does)  Ambulation Assistance: Independent  Transfer Assistance: Independent  Additional Comments: no falls. spouse for 24 hr.       Objective   Vision: Within Functional Limits  Hearing: Within functional limits    Orientation  Overall Orientation Status: Within Normal Limits  Observation/Palpation  Observation: LUE in sling- adjusted  Balance  Sitting Balance: Supervision  Standing Balance: Stand by assistance (PRN grooming)  Functional Mobility  Functional - Mobility Device: No device  Activity:  (EOB>toilet>recliner)  Assist Level: Stand by assistance  Functional Mobility Comments: min unsteadiness, no LOB.  reports mild unsteadiness at baseline  Toilet Transfers  Toilet - Technique: Ambulating  Equipment Used: Grab bars (R)  Toilet Transfer: Stand by assistance  ADL  Grooming: Stand by assistance (sink side hand washing)  UE Dressing: Minimal assistance (seated set up and assist for adjustments)  LE Dressing: Stand by assistance (dons pants and underpants with RUE)  Toileting: Stand by assistance  Tone RUE  RUE Tone: Normotonic  Tone LUE  LUE Tone: Normotonic  Coordination  Movements Are Fluid And Coordinated: Yes (slowed LUE d/t nerve block)     Bed mobility  Supine to Sit: Supervision  Scooting: Supervision  Comment: HOB elevated  Transfers  Sit to stand: Stand by assistance  Stand to sit: Stand by assistance  Transfer Comments: uses RUE to push up from chair     Cognition  Overall Cognitive Status: WNL        Sensation  Overall Sensation Status:  (limited LUE d/t nerve block)  Type of ROM/Therapeutic Exercise  Comment: LUE out of sling seated  Exercises  Elbow Flexion: 10  Elbow Extension: 10  Supination: 10  Pronation: 10  Wrist Flexion: 10  Wrist Extension: 10  Finger Flexion: 10  Finger Extension: 10  Other: shoulder pendulum exercises x10 side/side, up/back, circular- in stance at counter with SBA     LUE AROM (degrees)  LUE General AROM: CHARISMA shoulder, otherwise WFL  RUE AROM (degrees)  RUE AROM : WFL  LUE Strength  LUE Strength Comment: CHARISMA  RUE Strength  Gross RUE Strength: WFL                Plan   Plan  Times per week: 1-2  Current Treatment Recommendations: Endurance Training, Patient/Caregiver Education & Training, ROM, Self-Care / ADL, Balance Training, Pain Management, Functional Mobility Training, Safety Education & Training, Positioning    AM-PAC Score        AM-[facility] Inpatient Daily Activity Raw Score: 18 (11/18/21 0857)  -PAC Inpatient ADL T-Scale Score : 38.66 (11/18/21 0857)  ADL Inpatient CMS 0-100% Score: 46.65 (11/18/21 0857)  ADL Inpatient CMS G-Code Modifier : CK (11/18/21 0857)    Goals  Short term goals  Time Frame for Short term goals: prior to d/c  Short term goal 1: toileting SUP  Short term goal 2: LB dressing SUP  Short term goal 3: UB dressing SUP  Short term goal 4: Complete LUE exercises (see exercises) without cues with SUP  Patient Goals   Patient goals : get home       Therapy Time   Individual Concurrent Group Co-treatment   Time In 0755         Time Out 0855         Minutes 60         Timed Code Treatment Minutes: 45 Minutes (15 angelique.  15 TE. 30 ADL)       Maryjane Reji Mendoza, OTD, OTR/L

## 2021-11-22 ENCOUNTER — TELEPHONE (OUTPATIENT)
Dept: ORTHOPEDICS UNIT | Age: 71
End: 2021-11-22

## 2021-11-22 NOTE — TELEPHONE ENCOUNTER
Spoke with patient regarding post discharge from hospital.    Incision status: No drainage, odor, or redness noted per patient    Edema/Swelling/Teds: edema noted, teds not applicable, elevating arm on pillow when resting. Pain level and status: 2-4/10 tolerable     Use of pain medications: yes ; Patient stated they are taking their pain medication oxycodone, stated he took 5mg 3 hours apart at night only, educated on taking as prescribed 5mg-10mg every 6 hours as needed, patient verbalized understanding. Use of ice therapy: yes     Blood thinner: aspirin ; Verified with patient that they are taking their anticoagulant as prescribed twice a day. Bowels: no constipation, taking miralax and ducolax as needed. Performing codman exercises as instructed    Do you have all of your medications: yes    Changes in medications: no    Ortho Vitals: n/a      No other questions/concerns at this time. Encouraged patient to call Orthopedic Nurse Navigator Elsa Villanueva or Orthopedic office if has any questions/concerns.       Follow up appointments:    Future Appointments   Date Time Provider Abelardo Santiago   11/29/2021  9:00 AM Mai Kayser, MD [facility]     Electronically signed by Dinorah Fountain RN on 11/22/2021 at 3:00 PM

## 2021-11-29 ENCOUNTER — OFFICE VISIT (OUTPATIENT)
Dept: ORTHOPEDIC SURGERY | Age: 71
End: 2021-11-29

## 2021-11-29 DIAGNOSIS — Z96.612 HISTORY OF TOTAL REPLACEMENT OF LEFT SHOULDER JOINT: Primary | ICD-10-CM

## 2021-11-29 PROCEDURE — 99024 POSTOP FOLLOW-UP VISIT: CPT | Performed by: ORTHOPAEDIC SURGERY

## 2021-11-29 NOTE — PROGRESS NOTES
[facility] [place]. [address]  Phone: [phone]   Fax:     [phone]                                                       Physical Therapy Certification    Dear Referring Practitioner: Dr Lay Ferrer,    We had the pleasure of evaluating the following patient for physical therapy services at [facility]. A summary of our findings can be found in the initial assessment below. This includes our plan of care. If you have any questions or concerns regarding these findings, please do not hesitate to contact me at the office phone number checked above.   Thank you for the referral.       Physician Signature:_______________________________Date:__________________  By signing above (or electronic signature), therapists plan is approved by physician              Patient: Batsheva Courser   : 1950   MRN: [id number]  Referring Physician: Referring Practitioner: Dr Lay Ferrer      Evaluation Date: 2021      Medical Diagnosis Information:  Diagnosis: History of total replacement of left shoulder joint (D62.929   Treatment Diagnosis: Left shoulder movement dysfuction s/p Left rTSA                                         Insurance information: PT Insurance Information: [employer]    Precautions/ Contra-indications:   Latex Allergy:   [x]  NO      []YES  Preferred Language for Healthcare:   [x]English       []other:    C-SSRS Triggered by Intake questionnaire (Past 2 wk assessment ):   [x] No, Questionnaire did not trigger screening.   [] Yes, Patient intake triggered C-SSRS Screening      [] C-SSRS Screening completed  [] PCP notified via Epic     SUBJECTIVE: Patient stated complaint: H/O Left shoulder pain for several years  with resultant  L rTSA DOS 21, he retired and lives with wife in house, he is left hand dominant    Relevant Medical History:   Functional Outcomes Measure: Quickdash =37    Pain Scale: 0-5/10  Easing factors: avoids movement   Provocative factors: general mobility, reaching     Type: []Constant   [x]Intermittent  []Radiating []Localized []other:     Numbness/Tingling: none    Occupation/School:    Living Status/Prior Level of Function: Independent with ADLs and IADLs,    OBJECTIVE:   Posture: wfl's    Functional Mobility/Transfers: independent    Palpation: ttp to light touch nasra surgical scar    Bandages/Dressings/Incisions: minimal dried exudate under bandage, no sign of infection     Gait: (include devices/WB status): WNL     CERV ROM     Cervical Flexion wfl's    Cervical Extension wfl's    Cervical SB wfl's    Cervical rotation wfl's         PROM Left Right   Shoulder Flex 0-95    Shoulder Abd 0-95    Shoulder ER 0-20    Shoulder IR 0-40    Elbow flex 116         Strength  Left Right   Shoulder Flex Not assessed    Shoulder Scap \"    Shoulder ER \"    Shoulder IR \"    Elbow flex. 3-/5                 Reflexes Normal Abnormal Comments   [x]ALL NORMAL            S1-2 Seated achilles [] []    S1-2 Prone knee bend [] []    L3-4 Patellar tendon [] []    C5-6 Biceps [] []    C6 Brachioradialis [] []    C7-8 Triceps [] []    Clonus [] []    Babinski [] []    Roberts's [] []      Reflexes/Sensation:    [x]Dermatomes/Myotomes intact    [x]Reflexes equal and normal bilaterally   []Other:    Joint mobility:    []Normal    [x]Hypo left shoulder    []Hyper    Orthopedic Special Tests:                        [x] Patient history, allergies, meds reviewed. Medical chart reviewed. See intake form. Review Of Systems (ROS):  [x]Performed Review of systems (Integumentary, CardioPulmonary, Neurological) by intake and observation. Intake form has been scanned into medical record. Patient has been instructed to contact their primary care physician regarding ROS issues if not already being addressed at this time. Co-morbidities/Complexities (which will affect course of rehabilitation):   []None           Arthritic conditions   []Rheumatoid arthritis (M05.9)  [x]Osteoarthritis (M19.91)   Cardiovascular conditions   [x]Hypertension (I10)  [x]Hyperlipidemia (E78.5)  []Angina pectoris (I20)  []Atherosclerosis (I70)  []CVA Musculoskeletal conditions   []Disc pathology   []Congenital spine pathologies   []Prior surgical intervention  []Osteoporosis (M81.8)  []Osteopenia (M85.8)   Endocrine conditions   []Hypothyroid (E03.9)  []Hyperthyroid Gastrointestinal conditions   []Constipation (X39.07)   Metabolic conditions   []Morbid obesity (E66.01)  []Diabetes type 1(E10.65) or 2 (E11.65)   []Neuropathy (G60.9)     Pulmonary conditions   []Asthma (J45)  []Coughing   []COPD (J44.9)   Psychological Disorders  []Anxiety (F41.9)  []Depression (F32.9)   []Other:   [x]Other:     H/O Lumbar fusions and hardware removal  (L2-L5 fused) 2018  L KALA 2020        Barriers to/and or personal factors that will affect rehab potential:              [x]Age  []Sex   []Smoker              []Motivation/Lack of Motivation                        [x]Co-Morbidities              []Cognitive Function, education/learning barriers              [x]Environmental, home barriers              []profession/work barriers  []past PT/medical experience  []other:  Justification:     Falls Risk Assessment (30 days):   [x] Falls Risk assessed and no intervention required.   [] Falls Risk assessed and Patient requires intervention due to being higher risk   TUG score (>12s at risk):     [] Falls education provided, including         ASSESSMENT: Skilled PT services are required to address the following impairments   Functional Impairments:     [x]Noted spinal or UE joint hypomobility   []Noted spinal or UE joint hypermobility   []Decreased spinal/UE functional ROM   []Abnormal reflexes/sensation/myotomal/dermatomal deficits   [x]Decreased RC/scapular/core strength and neuromuscular control    [x]Decreased UE functional strength   []other:      Functional Activity Limitations (from functional questionnaire and intake)   []Reduced ability to tolerate prolonged functional positions   []Reduced ability or difficulty with changes of positions or transfers between positions   []Reduced ability to maintain good posture and demonstrate good body mechanics with sitting, bending, and lifting   [x] Reduced ability or tolerance with driving and/or computer work   [x]Reduced ability to perform lifting, reaching, carrying tasks   [x]Reduced ability to reach behind back   [x]Reduced ability to sleep    [x]Reduced ability to tolerate any impact through UE or spine   []Reduced ability to  or hold objects   [x]Reduced ability to throw or toss an object   []other:    Participation Restrictions   [x]Reduced participation in self care activities   [x]Reduced participation in home management activities   []Reduced participation in work activities   [x]Reduced participation in social activities. []Reduced participation in sport/recreational activities. Classification/Subgrouping:   [x]signs/symptoms consistent with post-surgical status including decreased ROM, strength and function.     []signs/symptoms consistent with joint sprain/strain    []signs/symptoms consistent with shoulder impingement (internal, external, primary or secondary)   []signs/symptoms consistent with shoulder/elbow/wrist tendinopathy   []Signs/symptoms consistent with Rotator cuff tear   []sign/symptoms consistent with labral tear   []signs/symptoms consistent with rib dysfunction   []signs/symptoms consistent with postural dysfunction   []signs/symptoms consistent with Glenohumeral IR Deficit - <45 degrees   []signs/symptoms consistent with facet dysfunction of cervical/thoracic spine   []signs/symptoms consistent with pathology which may benefit from Dry Needling   []signs/symptoms which may limit the use of advanced manual therapy techniques: (Elevated CV risk profile, recent trauma, intolerance to end range positions, prior TIA, visual issues, UE neurological compromise )     Prognosis/Rehab Potential:      []Excellent   [x]Good    []Fair   []Poor    Tolerance of evaluation/treatment:    []Excellent   [x]Good    []Fair   []Poor    Physical Therapy Evaluation Complexity Justification  [x] A history of present problem with:  [] no personal factors and/or comorbidities that impact the plan of care;  []1-2 personal factors and/or comorbidities that impact the plan of care  [x]3 personal factors and/or comorbidities that impact the plan of care  [x] An examination of body systems using standardized tests and measures addressing any of the following: body structures and functions (impairments), activity limitations, and/or participation restrictions;:  [] a total of 1-2 or more elements   [x] a total of 3 or more elements   [] a total of 4 or more elements   [x] A clinical presentation with:  [x] stable and/or uncomplicated characteristics   [] evolving clinical presentation with changing characteristics  [] unstable and unpredictable characteristics;   [x] Clinical decision making of [x] low, [] moderate, [] high complexity using standardized patient assessment instrument and/or measurable assessment of functional outcome. [x] EVAL (LOW) 14820 (typically 20 minutes face-to-face)  [] EVAL (MOD) 10601 (typically 30 minutes face-to-face)  [] EVAL (HIGH) 63328 (typically 45 minutes face-to-face)  [] RE-EVAL     PLAN:   Frequency/Duration:  2 days per week for 8-12 Weeks:  Interventions:  [x]  Therapeutic exercise including: strength training, ROM, for scapula, core and Upper extremity, including postural re-education. [x]  NMR activation and proprioception for UE, periscapular and RC muscles and Core, including postural re-education.     [x]  Manual therapy as indicated for shoulder, scapula, spine and associated soft tissue including: Dry Needling/IASTM, STM, PROM, Gr I-IV mobilizations, manipulation. [x] Modalities as needed that may include: thermal agents, E-stim, Biofeedback, US, iontophoresis as indicated  [x] Patient education on joint protection, postural re-education, activity modification, progression of HEP. HEP instruction: Access Code: [email or web address]: [place]/Date: 11/30/2021Prepared by: Lucinda Meigs BloomExerckeith   Seated Biceps Curl - 2 x daily - 7 x weekly - 3 sets - 10 reps   Seated Forearm Pronation and Supination AROM - 2 x daily - 7 x weekly - 3 sets - 10 reps - 3 hold   Seated Scapular Retraction - 2 x daily - 7 x weekly - 1 sets - 20 reps - 5 hold   Circular Shoulder Pendulum with Table Support - 2 x daily - 7 x weekly - 1 sets - 20 reps   Flexion-Extension Shoulder Pendulum with Table Support - 2 x daily - 7 x weekly - 1 sets - 20 reps   Horizontal Shoulder Pendulum with Table Support - 2 x daily - 7 x weekly - 1 sets - 20 reps  The patient demonstrated good tolerance to and understanding of the HEP. Written instructions have been issued. GOALS:  Patient stated goal: recover rom, strength and function   [] Progressing: [] Met: [] Not Met: [] Adjusted    Therapist goals for Patient:   Short Term Goals: To be achieved in: 2 weeks  1. Independent in HEP and progression per patient tolerance, in order to prevent re-injury. [] Progressing: [] Met: [] Not Met: [] Adjusted      Long Term Goals: To be achieved in: 8-12 weeks  1. Disability index score of 30% or less for the Quick DASH to assist with reaching prior level of function. [] Progressing: [] Met: [] Not Met: [] Adjusted  2. Patient will demonstrate increased AROM to flexion 0-120, abduction 0-110, er 0-60 or better  to allow for proper joint functioning as indicated by Functional Deficits. [] Progressing: [] Met: [] Not Met: [] Adjusted  3.  Patient will demonstrate an increase in NM recruitment/activation and overall GH and scapular strength to within 10 lbs HHD or WNL for proper functional mobility as indicated by patients Functional Deficits. [] Progressing: [] Met: [] Not Met: [] Adjusted  4. Patient will return to personal care, weight bearing and houshold  activities without increased symptoms or restriction. [] Progressing: [] Met: [] Not Met: [] Adjusted  5. Patient will have a decrease in pain 0-2/10 to facilitate improvement in movement, function, and ADLs as indicated by Functional Deficits. [] Progressing: [] Met: [] Not Met: [] Adjusted    Electronically signed by:  Betzaida Rodriguez PT      Note: If patient does not return for scheduled/recommended follow up visits, this note will serve as a discharge from care along with the most recent update on progress.

## 2021-11-29 NOTE — FLOWSHEET NOTE
[address]  Phone: [phone]   Fax:     [phone]        Physical Therapy Treatment Note/ Progress Report:       Date:  2021    Patient Name:  Abimael Pascual    :  1950  MRN: [id number]    Pertinent Medical History:     Medical/Treatment Diagnosis Information:  Diagnosis: History of total replacement of left shoulder joint (H25.377  Treatment Diagnosis: Left shoulder movement dysfuction s/p Left rTSA    Insurance/Certification information:  PT Insurance Information: [place] [employer]  Physician Information:  Referring Practitioner: Dr Mercedez Villarreal of care signed (Y/N): routed 21    Date of Patient follow up with Physician:      Progress Report: []  Yes  [x]  No     Date Range for reporting period:  Beginnin2021  Ending:      Progress report due (10 Rx/or 30 days whichever is less): /     Recertification due (POC duration/ or 90 days whichever is less):     Visit # POC/Insurance Allowable Auth Needed   1 Cohere [x]Yes    []No     Functional Outcomes Measure:   Date Assessed:  Test:  Score:     Pain level:  0-5/10     History of Injury: Patient stated complaint: H/O Left shoulder pain for several years  with resultant  L rTSA DOS 21, he retired and lives with wife in house, he is left hand dominant    SUBJECTIVE:  See eval    OBJECTIVE:    Observation:    Test measurements:      RESTRICTIONS/PRECAUTIONS:     Exercises/Interventions:   Therapeutic Ex (47341)  Min: Resistance/Reps Cues/Notes                                      Mat ex     Table assist flexion                               Manual Intervention  (14900)  Min:     PROM left shoulder Left shoulder flex, scaption and ER    DTM/ MFR Left pectoralis mm and UT's                         NMR re-education (14743)  Min:               Therapeutic Activity (40823)  Min: Modalities:  Min     CP            Other Therapeutic Activities:  Pt was educated on PT POC, Diagnosis, Prognosis, pathomechanics as well as frequency and duration of scheduling future physical therapy appointments. Time was also taken on this day to answer all patient questions and participation in PT. Reviewed appointment policy in detail with patient and patient verbalized understanding. Home Exercise Program:   HEP instruction: Access Code: [email or web address]: [employer]/Date: 11/30/2021Prepared by: Velinda Denise   · Seated Biceps Curl - 2 x daily - 7 x weekly - 3 sets - 10 reps   · Seated Forearm Pronation and Supination AROM - 2 x daily - 7 x weekly - 3 sets - 10 reps - 3 hold   · Seated Scapular Retraction - 2 x daily - 7 x weekly - 1 sets - 20 reps - 5 hold   · Circular Shoulder Pendulum with Table Support - 2 x daily - 7 x weekly - 1 sets - 20 reps   · Flexion-Extension Shoulder Pendulum with Table Support - 2 x daily - 7 x weekly - 1 sets - 20 reps   · Horizontal Shoulder Pendulum with Table Support - 2 x daily - 7 x weekly - 1 sets - 20 reps  The patient demonstrated good tolerance to and understanding of the HEP. Written instructions have been issued. Therapeutic Exercise and NMR EXR  [] (83277) Provided verbal/tactile cueing for activities related to strengthening, flexibility, endurance, ROM  for improvements in scapular, scapulothoracic and UE control with self care, reaching, carrying, lifting, house/yardwork, driving/computer work.    [] (16556) Provided verbal/tactile cueing for activities related to improving balance, coordination, kinesthetic sense, posture, motor skill, proprioception  to assist with  scapular, scapulothoracic and UE control with self care, reaching, carrying, lifting, house/yardwork, driving/computer work.     Therapeutic Activities:    [] (86109 or ) Provided verbal/tactile cueing for activities related to improving balance, coordination, kinesthetic sense, posture, motor skill, proprioception and motor activation to allow for proper function of scapular, scapulothoracic and UE control with self care, carrying, lifting, driving/computer work.      Home Exercise Program:    [x] (48749) Reviewed/Progressed HEP activities related to strengthening, flexibility, endurance, ROM of scapular, scapulothoracic and UE control with self care, reaching, carrying, lifting, house/yardwork, driving/computer work  [] (01077) Reviewed/Progressed HEP activities related to improving balance, coordination, kinesthetic sense, posture, motor skill, proprioception of scapular, scapulothoracic and UE control with self care, reaching, carrying, lifting, house/yardwork, driving/computer work      Manual Treatments:  PROM / STM / Oscillations-Mobs:  G-I, II, III, IV (PA's, Inf., Post.)  [] (44059) Provided manual therapy to mobilize soft tissue/joints of cervical/CT, scapular GHJ and UE for the purpose of modulating pain, promoting relaxation,  increasing ROM, reducing/eliminating soft tissue swelling/inflammation/restriction, improving soft tissue extensibility and allowing for proper ROM for normal function with self care, reaching, carrying, lifting, house/yardwork, driving/computer work    Charges:  Timed Code Treatment Minutes: 25   Total Treatment Minutes: 45       [x] EVAL (LOW) 98071 (typically 20 minutes face-to-face)  [] EVAL (MOD) 00586 (typically 30 minutes face-to-face)  [] EVAL (HIGH) 26461 (typically 45 minutes face-to-face)  [] RE-EVAL     [x] KA(87375) x     [] Dry needle 1 or 2 Muscles (51396)  [] NMR (84470) x     [] Dry needle 3+ Muscles (50472)  [x] Manual (80910) x     [] Ultrasound (21741) x  [] TA (57281) x     [] Mech Traction (85795)  [] ES(attended) (38323)     [] ES (un) (79019):   [] Vasopump (40292) [] Ionto (80370)   [] Other:    If [facility] Please Indicate Time In/Out  CPT Code Time in Time out                                   Approval Dates:  CPT Code Units Approved Units Used  Date Updated:                     Neto Tomas stated goal: recover rom, strength and function   []? Progressing: []? Met: []? Not Met: []? Adjusted     Therapist goals for Patient:   Short Term Goals: To be achieved in: 2 weeks  1. Independent in HEP and progression per patient tolerance, in order to prevent re-injury. []? Progressing: []? Met: []? Not Met: []? Adjusted        Long Term Goals: To be achieved in: 8-12 weeks  1. Disability index score of 30% or less for the Quick DASH to assist with reaching prior level of function. []? Progressing: []? Met: []? Not Met: []? Adjusted  2. Patient will demonstrate increased AROM to flexion 0-120, abduction 0-110, er 0-60 or better  to allow for proper joint functioning as indicated by Functional Deficits. []? Progressing: []? Met: []? Not Met: []? Adjusted  3. Patient will demonstrate an increase in NM recruitment/activation and overall GH and scapular strength to within 10 lbs HHD or WNL for proper functional mobility as indicated by patients Functional Deficits. []? Progressing: []? Met: []? Not Met: []? Adjusted  4. Patient will return to personal care, weight bearing and houshold  activities without increased symptoms or restriction. []? Progressing: []? Met: []? Not Met: []? Adjusted  5. Patient will have a decrease in pain 0-2/10 to facilitate improvement in movement, function, and ADLs as indicated by Functional Deficits. []? Progressing: []? Met: []? Not Met: []? Adjusted    ASSESSMENT:  See eval    Treatment/Activity Tolerance:  [x] Patient tolerated treatment well [] Patient limited by fatique  [] Patient limited by pain  [] Patient limited by other medical complications  [] Other:     Overall Progression Towards Functional goals/ Treatment Progress Update:  [] Patient is progressing as expected towards functional goals listed. [] Progression is slowed due to complexities/Impairments listed.   [] Progression has been slowed due to co-morbidities. [x] Plan just implemented, too soon to assess goals progression <30days   [] Goals require adjustment due to lack of progress  [] Patient is not progressing as expected and requires additional follow up with physician  [] Other    Prognosis for POC: [x] Good [] Fair  [] Poor    Patient requires continued skilled intervention: [x] Yes  [] No      PLAN: Follow [facility]  rTSA  protocol   [] Continue per plan of care [] Alter current plan (see comments)  [x] Plan of care initiated [] Hold pending MD visit [] Discharge    Electronically signed by: Myriam Funes PT     Note: If patient does not return for scheduled/recommended follow up visits, this note will serve as a discharge from care along with the most recent update on progress.

## 2021-11-29 NOTE — PROGRESS NOTES
[facility]  Office Visit    Chief Complaint: Follow-up s/p left reverse total shoulder arthroplasty    HPI:  Shireen Alston is a 66 y.o. who is here in follow-up of left reverse total shoulder arthroplasty performed on November 17, 2021. He is here in a sling today. Reports 0/10 pain at this time. He is occasionally taking Tylenol as needed for pain. He denies any other issues. Patient Active Problem List   Diagnosis    Elbow pain    Hyperlipidemia    HTN (hypertension)    Vitamin D deficiency    MTHFR mutation    Insulin resistance    Lumbosacral radiculopathy at L5    Pain of left hip joint    Arthritis of left hip    Spinal stenosis of lumbar region    Postlaminectomy syndrome    Status post lumbar spinal fusion    10/12/17 Removal fixation T11-L3 ; laminectomy L3-4 L4-L5; posterior spinal fusion L2-L3, L3-L4     Arthritis of left shoulder region    Primary osteoarthritis of right knee    Osteoarthritis of left shoulder due to rotator cuff injury       ROS:  Constitutional: denies fever, chills, weight loss  MSK: denies pain in other joints, muscle aches  Neurological: denies numbness, tingling, weakness    Exam:  Appearance: sitting in exam room chair, appears to be in no acute distress, awake and alert  Resp: unlabored breathing on room air  Skin: warm, dry and intact with out erythema or significant increased temperature  LUE: Incision is healing as expected. Prineo dressing is in place. There are no signs of erythema, fluctuance, drainage. Palpable radial pulse. Sensation intact light touch in axillary, radial, ulnar, median nerve distributions. He demonstrates active wrist flexion and extension and finger range of motion. Imaging:  3 views of the left knee shoulder performed and reviewed today.  Significant for reverse total shoulder arthroplasty prosthesis in place with no signs of osteolysis, loosening, fracture, or dislocation. Assessment:  S/p left reverse total shoulder arthroplasty    Plan:  He is doing well recovering from left reverse total shoulder arthroplasty. He will start to come out of the sling and start physical therapy. He will continue taking Tylenol as needed for pain control. He will follow up in 4 weeks for repeat assessment or sooner if needed. This dictation was done with [employer] dictation and may contain mechanical errors related to translation.

## 2021-11-30 ENCOUNTER — HOSPITAL ENCOUNTER (OUTPATIENT)
Dept: PHYSICAL THERAPY | Age: 71
Setting detail: THERAPIES SERIES
Discharge: HOME | End: 2021-11-30
Payer: MEDICARE

## 2021-11-30 PROCEDURE — 97110 THERAPEUTIC EXERCISES: CPT

## 2021-11-30 PROCEDURE — 97161 PT EVAL LOW COMPLEX 20 MIN: CPT

## 2021-11-30 PROCEDURE — 97140 MANUAL THERAPY 1/> REGIONS: CPT

## 2021-12-08 ENCOUNTER — HOSPITAL ENCOUNTER (OUTPATIENT)
Dept: PHYSICAL THERAPY | Age: 71
Setting detail: THERAPIES SERIES
Discharge: HOME | End: 2021-12-08
Payer: MEDICARE

## 2021-12-08 PROCEDURE — 97140 MANUAL THERAPY 1/> REGIONS: CPT

## 2021-12-08 PROCEDURE — 97110 THERAPEUTIC EXERCISES: CPT

## 2021-12-08 NOTE — FLOWSHEET NOTE
[address]  Phone: [phone]   Fax:     [phone]        Physical Therapy Treatment Note/ Progress Report:       Date:  2021    Patient Name:  Quin Nelson    :  1950  MRN: [id number]    Pertinent Medical History:     Medical/Treatment Diagnosis Information:  Diagnosis: History of total replacement of left shoulder joint (I70.927  Treatment Diagnosis: Left shoulder movement dysfuction s/p Left rTSA    Insurance/Certification information:  PT Insurance Information: [address] Medicare  Physician Information:  Referring Practitioner: Dr Yury Serra of care signed (Y/N): routed 21    Date of Patient follow up with Physician:      Progress Report: []  Yes  [x]  No     Date Range for reporting period:  Beginnin2021  Ending:      Progress report due (10 Rx/or 30 days whichever is less):      Recertification due (POC duration/ or 90 days whichever is less):     Visit # POC/Insurance Allowable Auth Needed   2 Cohere [x]Yes    []No     Functional Outcomes Measure:   Date Assessed:  Test:  Score:     Pain level:  0-2/10     History of Injury: Patient stated complaint: H/O Left shoulder pain for several years  with resultant  L rTSA DOS 21, he retired and lives with wife in house, he is left hand dominant    SUBJECTIVE:  21: Patient reports shoulder is doing well,just stiffness. States he still sleeping in a recliner at home.     OBJECTIVE:    Observation:    Test measurements:      RESTRICTIONS/PRECAUTIONS:     Exercises/Interventions:   Therapeutic Ex (12752)  Min: 15 Resistance/Reps Cues/Notes   scap retraction 5 sec x 10    gripper Yellow 20 x                              Mat ex     Table assist flexion  5 sec x 15                              Manual Intervention  (87845)  Min: 25     PROM left shoulder Left shoulder flex, scaption and ER    DTM/ MFR Left pectoralis mm and UT's                         NMR re-education (67746)  Min:               Therapeutic Activity (58951)  Min:               Modalities:  Min     CP 10 min           Other Therapeutic Activities:  Pt was educated on PT POC, Diagnosis, Prognosis, pathomechanics as well as frequency and duration of scheduling future physical therapy appointments. Time was also taken on this day to answer all patient questions and participation in PT. Reviewed appointment policy in detail with patient and patient verbalized understanding. Home Exercise Program:   HEP instruction: Access Code: [email or web address]: [employer]/Date: 11/30/2021Prepared by: Anaa Sequin   · Seated Biceps Curl - 2 x daily - 7 x weekly - 3 sets - 10 reps   · Seated Forearm Pronation and Supination AROM - 2 x daily - 7 x weekly - 3 sets - 10 reps - 3 hold   · Seated Scapular Retraction - 2 x daily - 7 x weekly - 1 sets - 20 reps - 5 hold   · Circular Shoulder Pendulum with Table Support - 2 x daily - 7 x weekly - 1 sets - 20 reps   · Flexion-Extension Shoulder Pendulum with Table Support - 2 x daily - 7 x weekly - 1 sets - 20 reps   · Horizontal Shoulder Pendulum with Table Support - 2 x daily - 7 x weekly - 1 sets - 20 reps  The patient demonstrated good tolerance to and understanding of the HEP. Written instructions have been issued. 12/08/21 reviewed above exercises  Access Code: [email or web address]  URL: [employer]/  Date: 12/08/2021  Prepared by: Tyron Damon    Exercises  Seated Shoulder Flexion Towel Slide at Table Top - 1 x daily - 7 x weekly - 3 sets - 15 reps - 5 hold    Therapeutic Exercise and NMR EXR  [] (99788) Provided verbal/tactile cueing for activities related to strengthening, flexibility, endurance, ROM  for improvements in scapular, scapulothoracic and UE control with self care, reaching, carrying, lifting, house/yardwork, driving/computer work.    [] (44970) Provided verbal/tactile cueing for activities related to improving balance, coordination, kinesthetic sense, posture, motor skill, proprioception  to assist with  scapular, scapulothoracic and UE control with self care, reaching, carrying, lifting, house/yardwork, driving/computer work. Therapeutic Activities:    [] (68273 or 96986) Provided verbal/tactile cueing for activities related to improving balance, coordination, kinesthetic sense, posture, motor skill, proprioception and motor activation to allow for proper function of scapular, scapulothoracic and UE control with self care, carrying, lifting, driving/computer work.      Home Exercise Program:    [x] (56542) Reviewed/Progressed HEP activities related to strengthening, flexibility, endurance, ROM of scapular, scapulothoracic and UE control with self care, reaching, carrying, lifting, house/yardwork, driving/computer work  [] (46448) Reviewed/Progressed HEP activities related to improving balance, coordination, kinesthetic sense, posture, motor skill, proprioception of scapular, scapulothoracic and UE control with self care, reaching, carrying, lifting, house/yardwork, driving/computer work      Manual Treatments:  PROM / STM / Oscillations-Mobs:  G-I, II, III, IV (PA's, Inf., Post.)  [] (18593) Provided manual therapy to mobilize soft tissue/joints of cervical/CT, scapular GHJ and UE for the purpose of modulating pain, promoting relaxation,  increasing ROM, reducing/eliminating soft tissue swelling/inflammation/restriction, improving soft tissue extensibility and allowing for proper ROM for normal function with self care, reaching, carrying, lifting, house/yardwork, driving/computer work    Charges:  Timed Code Treatment Minutes: 40   Total Treatment Minutes: 50       [] EVAL (LOW) 85339 (typically 20 minutes face-to-face)  [] EVAL (MOD) 70987 (typically 30 minutes face-to-face)  [] EVAL (HIGH) 53160 (typically 45 minutes face-to-face)  [] RE-EVAL     [x] LB(99614) x     [] Dry needle 1 or 2 Muscles (86709)  [] NMR (41083) x     [] Dry needle 3+ Muscles (57277)  [x] Manual (89071) x 2    [] Ultrasound (67878) x  [] TA (59976) x     [] Mech Traction (02286)  [] ES(attended) (28017)     [] ES (un) (91473):   [] Vasopump (51740) [] Ionto (26873)   [] Other:    If [facility] Please Indicate Time In/Out  CPT Code Time in Time out                                   Approval Dates:  CPT Code Units Approved Units Used  Date Updated:                     Wendy Marin stated goal: recover rom, strength and function   []? Progressing: []? Met: []? Not Met: []? Adjusted     Therapist goals for Patient:   Short Term Goals: To be achieved in: 2 weeks  1. Independent in HEP and progression per patient tolerance, in order to prevent re-injury. []? Progressing: []? Met: []? Not Met: []? Adjusted        Long Term Goals: To be achieved in: 8-12 weeks  1. Disability index score of 30% or less for the Quick DASH to assist with reaching prior level of function. []? Progressing: []? Met: []? Not Met: []? Adjusted  2. Patient will demonstrate increased AROM to flexion 0-120, abduction 0-110, er 0-60 or better  to allow for proper joint functioning as indicated by Functional Deficits. []? Progressing: []? Met: []? Not Met: []? Adjusted  3. Patient will demonstrate an increase in NM recruitment/activation and overall GH and scapular strength to within 10 lbs HHD or WNL for proper functional mobility as indicated by patients Functional Deficits. []? Progressing: []? Met: []? Not Met: []? Adjusted  4. Patient will return to personal care, weight bearing and houshold  activities without increased symptoms or restriction. []? Progressing: []? Met: []? Not Met: []? Adjusted  5. Patient will have a decrease in pain 0-2/10 to facilitate improvement in movement, function, and ADLs as indicated by Functional Deficits. []? Progressing: []? Met: []? Not Met: []?  Adjusted    ASSESSMENT:  See eval    Treatment/Activity Tolerance:  [x] Patient tolerated treatment well [] Patient limited by fatique  [] Patient limited by pain  [] Patient limited by other medical complications  [] Other:     Overall Progression Towards Functional goals/ Treatment Progress Update:  [] Patient is progressing as expected towards functional goals listed. [] Progression is slowed due to complexities/Impairments listed. [] Progression has been slowed due to co-morbidities. [x] Plan just implemented, too soon to assess goals progression <30days   [] Goals require adjustment due to lack of progress  [] Patient is not progressing as expected and requires additional follow up with physician  [] Other    Prognosis for POC: [x] Good [] Fair  [] Poor    Patient requires continued skilled intervention: [x] Yes  [] No      PLAN: Follow [facility]  rTSA  protocol   [] Continue per plan of care [] Alter current plan (see comments)  [x] Plan of care initiated [] Hold pending MD visit [] Discharge    Electronically signed by: Autumn Zambrano PTA     Note: If patient does not return for scheduled/recommended follow up visits, this note will serve as a discharge from care along with the most recent update on progress.

## 2021-12-14 ENCOUNTER — HOSPITAL ENCOUNTER (OUTPATIENT)
Dept: PHYSICAL THERAPY | Age: 71
Setting detail: THERAPIES SERIES
Discharge: HOME | End: 2021-12-14
Payer: MEDICARE

## 2021-12-14 PROCEDURE — 97110 THERAPEUTIC EXERCISES: CPT

## 2021-12-14 PROCEDURE — 97140 MANUAL THERAPY 1/> REGIONS: CPT

## 2021-12-14 NOTE — FLOWSHEET NOTE
[address] [facility] [place]. 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    Insurance/Certification information:  PT Insurance Information: [facility] [employer] Medicare  Physician Information:  Referring Practitioner: Dr Yury Serra of care signed (Y/N): routed 21    Date of Patient follow up with Physician:      Progress Report: []  Yes  [x]  No     Date Range for reporting period:  Beginnin2021  Ending:      Progress report due (10 Rx/or 30 days whichever is less): 16/15/00     Recertification due (POC duration/ or 90 days whichever is less):     Visit # POC/Insurance Allowable Auth Needed   3 Cohere [x]Yes    []No     Functional Outcomes Measure:   Date Assessed:  Test:  Score:     Pain level:  0-2/10     History of Injury: Patient stated complaint: H/O Left shoulder pain for several years  with resultant  L rTSA DOS 21, he retired and lives with wife in house, he is left hand dominant    SUBJECTIVE:  21: Patient reports shoulder is doing well,just stiffness. States he still sleeping in a recliner at home. 21: Patient reports shoulder is doing ok,minimal soreness. States he still sleeping in a recliner.     OBJECTIVE:    Observation:    Test measurements:      RESTRICTIONS/PRECAUTIONS:     Exercises/Interventions:   Therapeutic Ex (19131)  Min: 15 Resistance/Reps Cues/Notes   scap retraction 5 sec x 10    gripper Yellow 20 x     Supine cane press 2 x 10 amparo well   Deltoid isometrics add Mat ex     Table assist flexion  5 sec x 15                              Manual Intervention  (82166)  Min: 25     PROM left shoulder Left shoulder flex, scaption and ER    DTM/ MFR Left pectoralis mm and UT's                         NMR re-education (99180)  Min:               Therapeutic Activity (41372)  Min:               Modalities:  Min     CP 10 min           Other Therapeutic Activities:  Pt was educated on PT POC, Diagnosis, Prognosis, pathomechanics as well as frequency and duration of scheduling future physical therapy appointments. Time was also taken on this day to answer all patient questions and participation in PT. Reviewed appointment policy in detail with patient and patient verbalized understanding. Home Exercise Program:   HEP instruction: Access Code: [email or web address]: [employer]/Date: 11/30/2021Prepared by: Elyssa Barlow   · Seated Biceps Curl - 2 x daily - 7 x weekly - 3 sets - 10 reps   · Seated Forearm Pronation and Supination AROM - 2 x daily - 7 x weekly - 3 sets - 10 reps - 3 hold   · Seated Scapular Retraction - 2 x daily - 7 x weekly - 1 sets - 20 reps - 5 hold   · Circular Shoulder Pendulum with Table Support - 2 x daily - 7 x weekly - 1 sets - 20 reps   · Flexion-Extension Shoulder Pendulum with Table Support - 2 x daily - 7 x weekly - 1 sets - 20 reps   · Horizontal Shoulder Pendulum with Table Support - 2 x daily - 7 x weekly - 1 sets - 20 reps  The patient demonstrated good tolerance to and understanding of the HEP. Written instructions have been issued. 12/08/21 reviewed above exercises  Access Code: [email or web address]  URL: [email or web address]  Date: 12/08/2021  Prepared by: Keith Chiu    Exercises  Seated Shoulder Flexion Towel Slide at Table Top - 1 x daily - 7 x weekly - 3 sets - 15 reps - 5 hold    Therapeutic Exercise and NMR EXR  [] (40934) Provided verbal/tactile cueing for activities related to strengthening, flexibility, endurance, ROM for improvements in scapular, scapulothoracic and UE control with self care, reaching, carrying, lifting, house/yardwork, driving/computer work.    [] (66583) Provided verbal/tactile cueing for activities related to improving balance, coordination, kinesthetic sense, posture, motor skill, proprioception  to assist with  scapular, scapulothoracic and UE control with self care, reaching, carrying, lifting, house/yardwork, driving/computer work. Therapeutic Activities:    [] (98019 or 52197) Provided verbal/tactile cueing for activities related to improving balance, coordination, kinesthetic sense, posture, motor skill, proprioception and motor activation to allow for proper function of scapular, scapulothoracic and UE control with self care, carrying, lifting, driving/computer work.      Home Exercise Program:    [x] (16072) Reviewed/Progressed HEP activities related to strengthening, flexibility, endurance, ROM of scapular, scapulothoracic and UE control with self care, reaching, carrying, lifting, house/yardwork, driving/computer work  [] (89036) Reviewed/Progressed HEP activities related to improving balance, coordination, kinesthetic sense, posture, motor skill, proprioception of scapular, scapulothoracic and UE control with self care, reaching, carrying, lifting, house/yardwork, driving/computer work      Manual Treatments:  PROM / STM / Oscillations-Mobs:  G-I, II, III, IV (PA's, Inf., Post.)  [] (98113) Provided manual therapy to mobilize soft tissue/joints of cervical/CT, scapular GHJ and UE for the purpose of modulating pain, promoting relaxation,  increasing ROM, reducing/eliminating soft tissue swelling/inflammation/restriction, improving soft tissue extensibility and allowing for proper ROM for normal function with self care, reaching, carrying, lifting, house/yardwork, driving/computer work    Charges:  Timed Code Treatment Minutes: 40   Total Treatment Minutes: 50       [] RAMYA (LOW) [phone] (typically 20 minutes face-to-face)  [] EVAL (MOD) 77817 (typically 30 minutes face-to-face)  [] EVAL (HIGH) 46612 (typically 45 minutes face-to-face)  [] RE-EVAL     [x] AI(36469) x     [] Dry needle 1 or 2 Muscles (14402)  [] NMR (26035) x     [] Dry needle 3+ Muscles (75780)  [x] Manual (80664) x 2    [] Ultrasound (59046) x  [] TA (61068) x     [] Mech Traction (54212)  [] ES(attended) (55550)     [] ES (un) (10573):   [] Vasopump (51295) [] Ionto (77580)   [] Other:    If [facility] Please Indicate Time In/Out  CPT Code Time in Time out                                   Approval Dates:  CPT Code Units Approved Units Used  Date Updated:                     Kristofer Avendaño stated goal: recover rom, strength and function   []? Progressing: []? Met: []? Not Met: []? Adjusted     Therapist goals for Patient:   Short Term Goals: To be achieved in: 2 weeks  1. Independent in HEP and progression per patient tolerance, in order to prevent re-injury. []? Progressing: []? Met: []? Not Met: []? Adjusted        Long Term Goals: To be achieved in: 8-12 weeks  1. Disability index score of 30% or less for the Quick DASH to assist with reaching prior level of function. []? Progressing: []? Met: []? Not Met: []? Adjusted  2. Patient will demonstrate increased AROM to flexion 0-120, abduction 0-110, er 0-60 or better  to allow for proper joint functioning as indicated by Functional Deficits. []? Progressing: []? Met: []? Not Met: []? Adjusted  3. Patient will demonstrate an increase in NM recruitment/activation and overall GH and scapular strength to within 10 lbs HHD or WNL for proper functional mobility as indicated by patients Functional Deficits. []? Progressing: []? Met: []? Not Met: []? Adjusted  4. Patient will return to personal care, weight bearing and houshold  activities without increased symptoms or restriction. []? Progressing: []? Met: []? Not Met: []? Adjusted  5.   Patient will have a decrease in pain 0-2/10 to facilitate improvement in movement, function, and ADLs as indicated by Functional Deficits. []? Progressing: []? Met: []? Not Met: []? Adjusted    ASSESSMENT:  See eval    Treatment/Activity Tolerance:  [x] Patient tolerated treatment well [] Patient limited by fatique  [] Patient limited by pain  [] Patient limited by other medical complications  [] Other:     Overall Progression Towards Functional goals/ Treatment Progress Update:  [] Patient is progressing as expected towards functional goals listed. [] Progression is slowed due to complexities/Impairments listed. [] Progression has been slowed due to co-morbidities. [x] Plan just implemented, too soon to assess goals progression <30days   [] Goals require adjustment due to lack of progress  [] Patient is not progressing as expected and requires additional follow up with physician  [] Other    Prognosis for POC: [x] Good [] Fair  [] Poor    Patient requires continued skilled intervention: [x] Yes  [] No      PLAN: Follow [facility]  rTSA  protocol   [] Continue per plan of care [] Alter current plan (see comments)  [x] Plan of care initiated [] Hold pending MD visit [] Discharge    Electronically signed by: Shayan Emmanuel PTA     Note: If patient does not return for scheduled/recommended follow up visits, this note will serve as a discharge from care along with the most recent update on progress.

## 2021-12-16 ENCOUNTER — HOSPITAL ENCOUNTER (OUTPATIENT)
Dept: PHYSICAL THERAPY | Age: 71
Setting detail: THERAPIES SERIES
Discharge: HOME | End: 2021-12-16
Payer: MEDICARE

## 2021-12-16 PROCEDURE — 97140 MANUAL THERAPY 1/> REGIONS: CPT

## 2021-12-16 PROCEDURE — 97112 NEUROMUSCULAR REEDUCATION: CPT

## 2021-12-16 NOTE — FLOWSHEET NOTE
[facility] [place]. 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    Insurance/Certification information:  PT Insurance Information: [facility] [employer] Medicare  Physician Information:  Referring Practitioner: Dr Jameson Terry of care signed (Y/N): routed 21    Date of Patient follow up with Physician:      Progress Report: []  Yes  [x]  No     Date Range for reporting period:  Beginnin2021  Ending:      Progress report due (10 Rx/or 30 days whichever is less):      Recertification due (POC duration/ or 90 days whichever is less):     Visit # POC/Insurance Allowable Auth Needed   4 Cohere [x]Yes    []No     Functional Outcomes Measure:   Date Assessed:  Test:  Score:     Pain level:  0-2/10     History of Injury: Patient stated complaint: H/O Left shoulder pain for several years  with resultant  L rTSA DOS 21, he retired and lives with wife in house, he is left hand dominant    SUBJECTIVE:  21: Patient reports shoulder is doing well,just stiffness. States he still sleeping in a recliner at home. 21: Patient reports shoulder is doing ok,minimal soreness. States he still sleeping in a recliner. 21 Pt reports most of the time having no shoulder pain but was sore this morning following a busy day yesterday.      OBJECTIVE:    Observation:    Test measurements:    ROM:  Date      Shldr flexion    Shldr abd  Shldr IR         Shldr ER   A P A P A P A P   Eval           21 0-92 142 0-78 0-121 0-50 in scapular plane Not assessed  0-55 in scapular plane  65                           Strength:  Date Shoulder flexion Shoulder abduction Shoulder IR Shoulder  ER Bicep   Eval                                    RESTRICTIONS/PRECAUTIONS:     Exercises/Interventions:   Therapeutic Ex (49574)  Min: 15 Resistance/Reps Cues/Notes        gripper     Supine cane press 2# 2 x 10 amparo well   Deltoid isometrics add                   Mat ex     Table assist flexion      Supine scap                         Manual Intervention  (58125)  Min: 25     PROM left shoulder Left shoulder flex, scaption and ER    DTM/ MFR Left pectoralis mm, Lev scap., scalenes  and UT's     Stretch   To above muscles                    NMR re-education (26148)  Min:     Ball vs table 4 way Therapy ball 20 x ea Scapular retraction/ stability    Seated ranger Flexion Scapular retraction/ stability   Wax on / off 20 x ea  Scapular retraction/ stability and control    Supine scap protraction  10 x 2 Scapular stability/ eccentric control         Therapeutic Activity (04501)  Min:               Modalities:  Min     CP 10 min           Other Therapeutic Activities:  Pt was educated on PT POC, Diagnosis, Prognosis, pathomechanics as well as frequency and duration of scheduling future physical therapy appointments. Time was also taken on this day to answer all patient questions and participation in PT. Reviewed appointment policy in detail with patient and patient verbalized understanding. Home Exercise Program:   HEP instruction: Access Code: [email or web address]: [email or web address]/Date: 11/30/2021Prepared by: Lisa Tamayo   · Seated Biceps Curl - 2 x daily - 7 x weekly - 3 sets - 10 reps   · Seated Forearm Pronation and Supination AROM - 2 x daily - 7 x weekly - 3 sets - 10 reps - 3 hold   · Seated Scapular Retraction - 2 x daily - 7 x weekly - 1 sets - 20 reps - 5 hold   · Circular Shoulder Pendulum with Table Support - 2 x daily - 7 x weekly - 1 sets - 20 reps   · Flexion-Extension Shoulder Pendulum with Table Support - 2 x daily - 7 x weekly - 1 sets - 20 reps   · Horizontal Shoulder Pendulum with Table Support - 2 x daily - 7 x weekly - 1 sets - 20 reps  The patient demonstrated good tolerance to and understanding of the HEP. Written instructions have been issued. 12/08/21 reviewed above exercises  Access Code: [email or web address]  URL: [email or web address]  Date: 12/08/2021  Prepared by: Jose Ovalles    Exercises  Seated Shoulder Flexion Towel Slide at Table Top - 1 x daily - 7 x weekly - 3 sets - 15 reps - 5 hold    Therapeutic Exercise and NMR EXR  [] (64297) Provided verbal/tactile cueing for activities related to strengthening, flexibility, endurance, ROM  for improvements in scapular, scapulothoracic and UE control with self care, reaching, carrying, lifting, house/yardwork, driving/computer work.    [] (15419) Provided verbal/tactile cueing for activities related to improving balance, coordination, kinesthetic sense, posture, motor skill, proprioception  to assist with  scapular, scapulothoracic and UE control with self care, reaching, carrying, lifting, house/yardwork, driving/computer work. Therapeutic Activities:    [] (32039 or 34562) Provided verbal/tactile cueing for activities related to improving balance, coordination, kinesthetic sense, posture, motor skill, proprioception and motor activation to allow for proper function of scapular, scapulothoracic and UE control with self care, carrying, lifting, driving/computer work.      Home Exercise Program:    [x] (18626) Reviewed/Progressed HEP activities related to strengthening, flexibility, endurance, ROM of scapular, scapulothoracic and UE control with self care, reaching, carrying, lifting, house/yardwork, driving/computer work  [] (45977) Reviewed/Progressed HEP activities related to improving balance, coordination, kinesthetic sense, posture, motor skill, proprioception of scapular, scapulothoracic and UE control with self care, reaching, carrying, lifting, house/yardwork, driving/computer work      Manual Treatments:  PROM / STM / Oscillations-Mobs:  G-I, II, III, IV (PA's, Inf., Post.)  [] (51226) Provided manual therapy to mobilize soft tissue/joints of cervical/CT, scapular GHJ and UE for the purpose of modulating pain, promoting relaxation,  increasing ROM, reducing/eliminating soft tissue swelling/inflammation/restriction, improving soft tissue extensibility and allowing for proper ROM for normal function with self care, reaching, carrying, lifting, house/yardwork, driving/computer work    Charges:  Timed Code Treatment Minutes: 45   Total Treatment Minutes: 55       [] EVAL (LOW) 99472 (typically 20 minutes face-to-face)  [] EVAL (MOD) 03718 (typically 30 minutes face-to-face)  [] EVAL (HIGH) 90298 (typically 45 minutes face-to-face)  [] RE-EVAL     [x] MA(65446) x     [] Dry needle 1 or 2 Muscles (10086)  [] NMR (05186) x     [] Dry needle 3+ Muscles (51798)  [x] Manual (95345) x 2    [] Ultrasound (98762) x  [] TA (20167) x     [] Mech Traction (90180)  [] ES(attended) (53247)     [] ES (un) (56579):   [] Vasopump (45779) [] Ionto (94650)   [] Other:    If [facility] Please Indicate Time In/Out  CPT Code Time in Time out                                   Approval Dates:  CPT Code Units Approved Units Used  Date Updated:                     Reuben Hayden stated goal: recover rom, strength and function   []? Progressing: []? Met: []? Not Met: []? Adjusted     Therapist goals for Patient:   Short Term Goals: To be achieved in: 2 weeks  1. Independent in HEP and progression per patient tolerance, in order to prevent re-injury. []? Progressing: []? Met: []? Not Met: []? Adjusted        Long Term Goals: To be achieved in: 8-12 weeks  1. Disability index score of 30% or less for the Quick DASH to assist with reaching prior level of function. []? Progressing: []? Met: []?  Not Met: []? Adjusted  2. Patient will demonstrate increased AROM to flexion 0-120, abduction 0-110, er 0-60 or better  to allow for proper joint functioning as indicated by Functional Deficits. []? Progressing: []? Met: []? Not Met: []? Adjusted  3. Patient will demonstrate an increase in NM recruitment/activation and overall GH and scapular strength to within 10 lbs HHD or WNL for proper functional mobility as indicated by patients Functional Deficits. []? Progressing: []? Met: []? Not Met: []? Adjusted  4. Patient will return to personal care, weight bearing and houshold  activities without increased symptoms or restriction. []? Progressing: []? Met: []? Not Met: []? Adjusted  5. Patient will have a decrease in pain 0-2/10 to facilitate improvement in movement, function, and ADLs as indicated by Functional Deficits. []? Progressing: []? Met: []? Not Met: []? Adjusted    ASSESSMENT:  See eval    Treatment/Activity Tolerance:  [x] Patient tolerated treatment well [] Patient limited by fatique  [] Patient limited by pain  [] Patient limited by other medical complications  [] Other:     Overall Progression Towards Functional goals/ Treatment Progress Update:  [] Patient is progressing as expected towards functional goals listed. [] Progression is slowed due to complexities/Impairments listed. [] Progression has been slowed due to co-morbidities.   [x] Plan just implemented, too soon to assess goals progression <30days   [] Goals require adjustment due to lack of progress  [] Patient is not progressing as expected and requires additional follow up with physician  [] Other    Prognosis for POC: [x] Good [] Fair  [] Poor    Patient requires continued skilled intervention: [x] Yes  [] No      PLAN: Increase thera ex, HEP, decrease man Rx   Follow [facility]  rTSA  protocol   [] Continue per plan of care [] Alter current plan (see comments)  [x] Plan of care initiated [] Hold pending MD visit [] Discharge    Electronically signed by: Matias Thakur PT     Note: If patient does not return for scheduled/recommended follow up visits, this note will serve as a discharge from care along with the most recent update on progress.

## 2021-12-21 ENCOUNTER — HOSPITAL ENCOUNTER (OUTPATIENT)
Dept: PHYSICAL THERAPY | Age: 71
Setting detail: THERAPIES SERIES
End: 2021-12-21
Payer: MEDICARE

## 2021-12-23 ENCOUNTER — HOSPITAL ENCOUNTER (OUTPATIENT)
Dept: PHYSICAL THERAPY | Age: 71
Setting detail: THERAPIES SERIES
Discharge: HOME | End: 2021-12-23
Payer: MEDICARE

## 2021-12-23 PROCEDURE — 97112 NEUROMUSCULAR REEDUCATION: CPT

## 2021-12-23 PROCEDURE — 97140 MANUAL THERAPY 1/> REGIONS: CPT

## 2021-12-23 PROCEDURE — 97110 THERAPEUTIC EXERCISES: CPT

## 2021-12-23 NOTE — FLOWSHEET NOTE
[address] 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    Insurance/Certification information:  PT Insurance Information: [facility] [employer] Medicare  Physician Information:  Referring Practitioner: Dr Pratibha Levy of care signed (Y/N): routed 21    Date of Patient follow up with Physician:      Progress Report: []  Yes  [x]  No     Date Range for reporting period:  Beginnin2021  Ending:      Progress report due (10 Rx/or 30 days whichever is less): 53/10/84     Recertification due (POC duration/ or 90 days whichever is less):     Visit # POC/Insurance Allowable Auth Needed    24 visits thru 22 Cohere [x]Yes    []No     Functional Outcomes Measure:   Date Assessed:  Test:  Score:     Pain level:  0/10     History of Injury: Patient stated complaint: H/O Left shoulder pain for several years  with resultant  L rTSA DOS 21, he retired and lives with wife in house, he is left hand dominant    SUBJECTIVE:  21: Patient reports shoulder is doing well,just stiffness. States he still sleeping in a recliner at home. 21: Patient reports shoulder is doing ok,minimal soreness. States he still sleeping in a recliner. 21 Pt reports most of the time having no shoulder pain but was sore this morning following a busy day yesterday. 21 Pt reports no pain today, he has been attempting to transition back to bed but recliner still feels more comfortable.      OBJECTIVE:    Observation:  Test measurements:    ROM:  Date      Shldr flexion    Shldr abd  Shldr IR         Shldr ER   A P A P A P A P   Eval           12/16/21 0-92 142 0-78 0-121 0-50 in scapular plane Not assessed  0-55 in scapular plane  65   12/23/21  145  125    65                Strength:  Date Shoulder flexion Shoulder abduction Shoulder IR Shoulder  ER Bicep   Eval                                    RESTRICTIONS/PRECAUTIONS:     Exercises/Interventions:   Therapeutic Ex (84630)  Min: 15 Resistance/Reps Cues/Notes        gripper     Supine cane press 2 x 10 amparo well   Deltoid isometrics add    SL'ing [place]  Flexion with man assist 20 x              Mat ex          Supine scap     Incline table slide. 30 x                    Manual Intervention  (98390)  Min: 20     PROM left shoulder Left shoulder flex, scaption and ER    DTM/ MFR Left pectoralis mm, Lev scap., scalenes  and UT's     Stretch   To above muscles     Scap. Mobs  In [place] with ranger               NMR re-education (77173)  Min:15     Ball vs table 4 way Therapy ball 20 x ea Scapular retraction/ stability    Seated ranger Flexion Scapular retraction/ stability   Wax on / off 20 x ea  Scapular retraction/ stability and control    Supine scap protraction  10 x 2 Scapular stability/ eccentric control         Therapeutic Activity (64966)  Min:               Modalities:  Min     CP 10 min           Other Therapeutic Activities:  Pt was educated on PT POC, Diagnosis, Prognosis, pathomechanics as well as frequency and duration of scheduling future physical therapy appointments. Time was also taken on this day to answer all patient questions and participation in PT. Reviewed appointment policy in detail with patient and patient verbalized understanding. Home Exercise Program:   HEP instruction: Access Code: [email or web address]: [employer]/Date: 11/30/2021Prepared by: Elyssa Barlow   · Seated Biceps Curl - 2 x daily - 7 x weekly - 3 sets - 10 reps scapulothoracic and UE control with self care, reaching, carrying, lifting, house/yardwork, driving/computer work  [] (56926) Reviewed/Progressed HEP activities related to improving balance, coordination, kinesthetic sense, posture, motor skill, proprioception of scapular, scapulothoracic and UE control with self care, reaching, carrying, lifting, house/yardwork, driving/computer work      Manual Treatments:  PROM / STM / Oscillations-Mobs:  G-I, II, III, IV (PA's, Inf., Post.)  [] ([id number]) Provided manual therapy to mobilize soft tissue/joints of cervical/CT, scapular GHJ and UE for the purpose of modulating pain, promoting relaxation,  increasing ROM, reducing/eliminating soft tissue swelling/inflammation/restriction, improving soft tissue extensibility and allowing for proper ROM for normal function with self care, reaching, carrying, lifting, house/yardwork, driving/computer work    Charges:  Timed Code Treatment Minutes: 50   Total Treatment Minutes: 60       [] EVAL (LOW) 00578 (typically 20 minutes face-to-face)  [] EVAL (MOD) 25741 (typically 30 minutes face-to-face)  [] EVAL (HIGH) 99676 (typically 45 minutes face-to-face)  [] RE-EVAL     [x] PQ(13132) x     [] Dry needle 1 or 2 Muscles (60121)  [x] NMR (29279) x     [] Dry needle 3+ Muscles (60122)  [x] Manual (62851) x     [] Ultrasound (72069) x  [] TA (11181) x     [] Mech Traction (34945)  [] ES(attended) (26343)     [] ES (un) (13530):   [] Vasopump (29011) [] Ionto (44598)   [] Other:    If [facility] Please Indicate Time In/Out  CPT Code Time in Time out                                   Approval Dates:  CPT Code Units Approved Units Used  Date Updated:                     Olya Cook stated goal: recover rom, strength and function   []? Progressing: []? Met: []? Not Met: []? Adjusted     Therapist goals for Patient:   Short Term Goals: To be achieved in: 2 weeks  1. Independent in HEP and progression per patient tolerance, in order to prevent re-injury. []? Progressing: []? Met: []? Not Met: []? Adjusted        Long Term Goals: To be achieved in: 8-12 weeks  1. Disability index score of 30% or less for the Quick DASH to assist with reaching prior level of function. []? Progressing: []? Met: []? Not Met: []? Adjusted  2. Patient will demonstrate increased AROM to flexion 0-120, abduction 0-110, er 0-60 or better  to allow for proper joint functioning as indicated by Functional Deficits. []? Progressing: []? Met: []? Not Met: []? Adjusted  3. Patient will demonstrate an increase in NM recruitment/activation and overall GH and scapular strength to within 10 lbs HHD or WNL for proper functional mobility as indicated by patients Functional Deficits. []? Progressing: []? Met: []? Not Met: []? Adjusted  4. Patient will return to personal care, weight bearing and houshold  activities without increased symptoms or restriction. []? Progressing: []? Met: []? Not Met: []? Adjusted  5. Patient will have a decrease in pain 0-2/10 to facilitate improvement in movement, function, and ADLs as indicated by Functional Deficits. []? Progressing: []? Met: []? Not Met: []? Adjusted    ASSESSMENT:  See eval    Treatment/Activity Tolerance:  [x] Patient tolerated treatment well [] Patient limited by fatique  [] Patient limited by pain  [] Patient limited by other medical complications  [] Other:     Overall Progression Towards Functional goals/ Treatment Progress Update:  [] Patient is progressing as expected towards functional goals listed. [x] Progression is slowed due to complexities/Impairments listed. [] Progression has been slowed due to co-morbidities.   [] Plan just implemented, too soon to assess goals progression <30days   [] Goals require adjustment due to lack of progress  [] Patient is not progressing as expected and requires additional follow up with physician  [] Other    Prognosis for POC: [x] Good [] Fair  [] Poor    Patient requires continued skilled intervention: [x] Yes  [] No      PLAN: ADD isometrics  Increase thera ex, HEP, decrease man Rx   Follow [facility]  rTSA  protocol   [] Continue per plan of care [] Alter current plan (see comments)  [x] Plan of care initiated [] Hold pending MD visit [] Discharge    Electronically signed by: Hudson Rodríguez, PT     Note: If patient does not return for scheduled/recommended follow up visits, this note will serve as a discharge from care along with the most recent update on progress.

## 2021-12-28 ENCOUNTER — HOSPITAL ENCOUNTER (OUTPATIENT)
Dept: PHYSICAL THERAPY | Age: 71
Setting detail: THERAPIES SERIES
Discharge: HOME | End: 2021-12-28
Payer: MEDICARE

## 2021-12-28 PROCEDURE — 97112 NEUROMUSCULAR REEDUCATION: CPT

## 2021-12-28 PROCEDURE — 97140 MANUAL THERAPY 1/> REGIONS: CPT

## 2021-12-28 PROCEDURE — 97110 THERAPEUTIC EXERCISES: CPT

## 2021-12-28 NOTE — FLOWSHEET NOTE
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    Insurance/Certification information:  PT Insurance Information: [facility] [employer] Medicare  Physician Information:  Referring Practitioner:  [address] of care signed (Y/N): routed 21    Date of Patient follow up with Physician:      Progress Report: []  Yes  [x]  No     Date Range for reporting period:  Beginnin2021  Ending:      Progress report due (10 Rx/or 30 days whichever is less):      Recertification due (POC duration/ or 90 days whichever is less):     Visit # POC/Insurance Allowable Auth Needed    24 visits thru 22 [place] [x]Yes    []No     Functional Outcomes Measure:   Date Assessed:  Test:  Score:     Pain level:  0/10     History of Injury: Patient stated complaint: H/O Left shoulder pain for several years  with resultant  L rTSA DOS 21, he retired and lives with wife in house, he is left hand dominant    SUBJECTIVE:  21: Patient reports shoulder is doing well,just stiffness. States he still sleeping in a recliner at home. 21: Patient reports shoulder is doing ok,minimal soreness. States he still sleeping in a recliner. 21 Pt reports most of the time having no shoulder pain but was sore this morning following a busy day yesterday. 21 Pt reports no pain today, he has been attempting to transition back to bed but recliner still feels more comfortable.    21 Pt reports no pain or problems with how his shoulder feels today. OBJECTIVE:    Observation:    Test measurements:    ROM:  Date      Shldr flexion    Shldr abd  Shldr IR         Shldr ER   A P A P A P A P   Eval           12/16/21 0-92 142 0-78 0-121 0-50 in scapular plane Not assessed  0-55 in scapular plane  65   12/23/21  145  125    65                Strength:  Date Shoulder flexion Shoulder abduction Shoulder IR Shoulder  ER Bicep   Eval                                    RESTRICTIONS/PRECAUTIONS:     Exercises/Interventions:   Therapeutic Ex (60523)  Min: 15 Resistance/Reps Cues/Notes        gripper     Supine cane press 2 x 10 amparo well   Deltoid isometrics Flex, abd, ir and er 10 x ea     SL'ing [place]  Flexion with man assist 20 x              Mat ex          Supine scap     Incline table slide. 30 x                    Manual Intervention  (83039)  Min: 20     PROM left shoulder Left shoulder flex, scaption and ER    DTM/ MFR Left pectoralis mm, Lev scap., scalenes  and UT's     Stretch   To above muscles     Scap. Mobs  In [place] with ranger               NMR re-education (45979)  Min:15     Ball vs table 4 way Therapy ball 20 x ea Scapular retraction/ stability    Mor[place] Standing vs table 10 x 2  Scapular retraction/ stability   Wax on / off 20 x ea  Scapular retraction/ stability and control    Supine scap protraction  1 Scapular stability/ eccentric control         Therapeutic Activity (23947)  Min:               Modalities:  Min     CP 10 min           Other Therapeutic Activities:  Pt was educated on PT POC, Diagnosis, Prognosis, pathomechanics as well as frequency and duration of scheduling future physical therapy appointments. Time was also taken on this day to answer all patient questions and participation in PT. Reviewed appointment policy in detail with patient and patient verbalized understanding. Home Exercise Program:   HEP instruction: Access Code: [email or web address]: [email or web address]. com/Date: 11/30/2021Prepared by: Alison House   · Seated Biceps Curl - 2 x daily - 7 x weekly - 3 sets - 10 reps   · Seated Forearm Pronation and Supination AROM - 2 x daily - 7 x weekly - 3 sets - 10 reps - 3 hold   · Seated Scapular Retraction - 2 x daily - 7 x weekly - 1 sets - 20 reps - 5 hold   · Circular Shoulder Pendulum with Table Support - 2 x daily - 7 x weekly - 1 sets - 20 reps   · Flexion-Extension Shoulder Pendulum with Table Support - 2 x daily - 7 x weekly - 1 sets - 20 reps   · Horizontal Shoulder Pendulum with Table Support - 2 x daily - 7 x weekly - 1 sets - 20 reps  The patient demonstrated good tolerance to and understanding of the HEP. Written instructions have been issued. 12/08/21 reviewed above exercises  Access Code: [email or web address]  URL: [email or web address]  Date: 12/08/2021  Prepared by: Livier Zhu    Exercises  Seated Shoulder Flexion Towel Slide at Table Top - 1 x daily - 7 x weekly - 3 sets - 15 reps - 5 hold      Access Code: [email or web address]: [email or web address]. com/Date: 12/28/2021Prepared by: Lacey Thomas BloomExerckeith   Isometric Shoulder Abduction - 2 x daily - 7 x weekly - 2 sets - 10 reps - 5 hold   Isometric Shoulder Flexion - 2 x daily - 7 x weekly - 2 sets - 10 reps - 5 hold   Isometric Shoulder Internal Rotation - 2 x daily - 7 x weekly - 2 sets - 10 reps - 5 hold   Isometric Shoulder External Rotation - 2 x daily - 7 x weekly - 2 sets - 10 reps - 5 hold  The patient demonstrated good tolerance to and understanding of the HEP. Written instructions have been issued.     Therapeutic Exercise and NMR EXR  [] (48767) Provided verbal/tactile cueing for activities related to strengthening, flexibility, endurance, ROM  for improvements in scapular, scapulothoracic and UE control with self care, reaching, carrying, lifting, house/yardwork, driving/computer work.    [] (03166) Provided verbal/tactile cueing for activities related to improving balance, coordination, kinesthetic (81272)  [x] Manual (70359) x     [] Ultrasound (13156) x  [] TA (80810) x     [] Mech Traction (20512)  [] ES(attended) (53672)     [] ES (un) (59249):   [] Vasopump (64184) [] Ionto (68115)   [] Other:    If [facility] Please Indicate Time In/Out  CPT Code Time in Time out                                   Approval Dates:  CPT Code Units Approved Units Used  Date Updated:                     Wily Rainey stated goal: recover rom, strength and function   []? Progressing: []? Met: []? Not Met: []? Adjusted     Therapist goals for Patient:   Short Term Goals: To be achieved in: 2 weeks  1. Independent in HEP and progression per patient tolerance, in order to prevent re-injury. []? Progressing: []? Met: []? Not Met: []? Adjusted        Long Term Goals: To be achieved in: 8-12 weeks  1. Disability index score of 30% or less for the Quick DASH to assist with reaching prior level of function. []? Progressing: []? Met: []? Not Met: []? Adjusted  2. Patient will demonstrate increased AROM to flexion 0-120, abduction 0-110, er 0-60 or better  to allow for proper joint functioning as indicated by Functional Deficits. []? Progressing: []? Met: []? Not Met: []? Adjusted  3. Patient will demonstrate an increase in NM recruitment/activation and overall GH and scapular strength to within 10 lbs HHD or WNL for proper functional mobility as indicated by patients Functional Deficits. []? Progressing: []? Met: []? Not Met: []? Adjusted  4. Patient will return to personal care, weight bearing and houshold  activities without increased symptoms or restriction. []? Progressing: []? Met: []? Not Met: []? Adjusted  5. Patient will have a decrease in pain 0-2/10 to facilitate improvement in movement, function, and ADLs as indicated by Functional Deficits. []? Progressing: []? Met: []? Not Met: []?  Adjusted    ASSESSMENT:  See eval    Treatment/Activity Tolerance:  [x] Patient tolerated treatment well [] Patient limited by marce  [] Patient limited by pain  [] Patient limited by other medical complications  [] Other:     Overall Progression Towards Functional goals/ Treatment Progress Update:  [] Patient is progressing as expected towards functional goals listed. [x] Progression is slowed due to complexities/Impairments listed. [] Progression has been slowed due to co-morbidities. [] Plan just implemented, too soon to assess goals progression <30days   [] Goals require adjustment due to lack of progress  [] Patient is not progressing as expected and requires additional follow up with physician  [] Other    Prognosis for POC: [x] Good [] Fair  [] Poor    Patient requires continued skilled intervention: [x] Yes  [] No      PLAN: Reassess  Quick dash due   Increase thera ex, HEP, decrease man Rx   Follow [facility]  rTSA  protocol   [] Continue per plan of care [] Alter current plan (see comments)  [x] Plan of care initiated [] Hold pending MD visit [] Discharge    Electronically signed by: Prema Montano PT     Note: If patient does not return for scheduled/recommended follow up visits, this note will serve as a discharge from care along with the most recent update on progress.

## 2021-12-30 ENCOUNTER — HOSPITAL ENCOUNTER (OUTPATIENT)
Dept: PHYSICAL THERAPY | Age: 71
Setting detail: THERAPIES SERIES
Discharge: HOME | End: 2021-12-30
Payer: MEDICARE

## 2021-12-30 PROCEDURE — 97110 THERAPEUTIC EXERCISES: CPT

## 2021-12-30 PROCEDURE — 97112 NEUROMUSCULAR REEDUCATION: CPT

## 2021-12-30 PROCEDURE — 97140 MANUAL THERAPY 1/> REGIONS: CPT

## 2021-12-30 NOTE — FLOWSHEET NOTE
problems with how his shoulder feels today. 12/30/21 Pt reports \"every day I can feel a little more range of motion\". OBJECTIVE:    Observation:    Test measurements:    ROM:  Date      Shldr flexion    Shldr abd  Shldr IR         Shldr ER   A P A P A P A P   Eval           12/16/21 0-92 142 0-78 0-121 0-50 in scapular plane Not assessed  0-55 in scapular plane  65   12/23/21  145  125    65                           Strength:  Date Shoulder flexion Shoulder abduction Shoulder IR Shoulder  ER Bicep   Eval                                    RESTRICTIONS/PRECAUTIONS:     Exercises/Interventions:   Therapeutic Ex (35207)  Min: 15 Resistance/Reps Cues/Notes        gripper     Supine cane press  amparo well   Deltoid isometrics Flex, abd, ir and er 10 x ea     SL'ing [place]  Flexion with man assist 20 x              Mat ex          Supine scap     Incline table slide. 30 x                    Manual Intervention  (89191)  Min: 20     PROM left shoulder Left shoulder flex, scaption and ER    DTM/ MFR Left pectoralis mm, Lev scap., scalenes  and UT's     Stretch   To above muscles     Scap. Mobs  In [place] with ranger               NMR re-education (64664)  Min:15     Ball vs table 4 way Therapy ball 20 x ea Scapular retraction/ stability    [place] Standing vs table 10 x 2  Scapular retraction/ stability   Wax on / off 20 x ea  Scapular retraction/ stability and control    Supine scap protraction  1 Scapular stability/ eccentric control         Therapeutic Activity (53701)  Min:               Modalities:  Min     CP            Other Therapeutic Activities:  Pt was educated on PT POC, Diagnosis, Prognosis, pathomechanics as well as frequency and duration of scheduling future physical therapy appointments. Time was also taken on this day to answer all patient questions and participation in PT. Reviewed appointment policy in detail with patient and patient verbalized understanding.      Home Exercise Program:   HEP instruction: Access Code: [email or web address]: [place]/Date: 11/30/2021Prepared by: Pollo Berg   · Seated Biceps Curl - 2 x daily - 7 x weekly - 3 sets - 10 reps   · Seated Forearm Pronation and Supination AROM - 2 x daily - 7 x weekly - 3 sets - 10 reps - 3 hold   · Seated Scapular Retraction - 2 x daily - 7 x weekly - 1 sets - 20 reps - 5 hold   · Circular Shoulder Pendulum with Table Support - 2 x daily - 7 x weekly - 1 sets - 20 reps   · Flexion-Extension Shoulder Pendulum with Table Support - 2 x daily - 7 x weekly - 1 sets - 20 reps   · Horizontal Shoulder Pendulum with Table Support - 2 x daily - 7 x weekly - 1 sets - 20 reps  The patient demonstrated good tolerance to and understanding of the HEP. Written instructions have been issued. 12/08/21 reviewed above exercises  Access Code: [email or web address]  URL: [email or web address]  Date: 12/08/2021  Prepared by: Minus Juana    Exercises  Seated Shoulder Flexion Towel Slide at Table Top - 1 x daily - 7 x weekly - 3 sets - 15 reps - 5 hold      Access Code: [email or web address]: [email or web address]/Date: 12/28/2021Prepared by: Tamika AlvarezExerckeith   Isometric Shoulder Abduction - 2 x daily - 7 x weekly - 2 sets - 10 reps - 5 hold   Isometric Shoulder Flexion - 2 x daily - 7 x weekly - 2 sets - 10 reps - 5 hold   Isometric Shoulder Internal Rotation - 2 x daily - 7 x weekly - 2 sets - 10 reps - 5 hold   Isometric Shoulder External Rotation - 2 x daily - 7 x weekly - 2 sets - 10 reps - 5 hold  The patient demonstrated good tolerance to and understanding of the HEP. Written instructions have been issued.     Therapeutic Exercise and NMR EXR  [] (10591) Provided verbal/tactile cueing for activities related to strengthening, flexibility, endurance, ROM  for improvements in scapular, scapulothoracic and UE control with self care, reaching, carrying, lifting, house/yardwork, driving/computer work.    [] (69311) Provided verbal/tactile cueing for activities related to improving balance, coordination, kinesthetic sense, posture, motor skill, proprioception  to assist with  scapular, scapulothoracic and UE control with self care, reaching, carrying, lifting, house/yardwork, driving/computer work. Therapeutic Activities:    [] (84594 or 24585) Provided verbal/tactile cueing for activities related to improving balance, coordination, kinesthetic sense, posture, motor skill, proprioception and motor activation to allow for proper function of scapular, scapulothoracic and UE control with self care, carrying, lifting, driving/computer work.      Home Exercise Program:    [x] (39801) Reviewed/Progressed HEP activities related to strengthening, flexibility, endurance, ROM of scapular, scapulothoracic and UE control with self care, reaching, carrying, lifting, house/yardwork, driving/computer work  [] (78639) Reviewed/Progressed HEP activities related to improving balance, coordination, kinesthetic sense, posture, motor skill, proprioception of scapular, scapulothoracic and UE control with self care, reaching, carrying, lifting, house/yardwork, driving/computer work      Manual Treatments:  PROM / STM / Oscillations-Mobs:  G-I, II, III, IV (PA's, Inf., Post.)  [] (97271) Provided manual therapy to mobilize soft tissue/joints of cervical/CT, scapular GHJ and UE for the purpose of modulating pain, promoting relaxation,  increasing ROM, reducing/eliminating soft tissue swelling/inflammation/restriction, improving soft tissue extensibility and allowing for proper ROM for normal function with self care, reaching, carrying, lifting, house/yardwork, driving/computer work    Charges:  Timed Code Treatment Minutes: 40   Total Treatment Minutes: 42       [] EVAL (LOW) 30880 (typically 20 minutes face-to-face)  [] EVAL (MOD) 75549 (typically 30 minutes face-to-face)  [] EVAL (HIGH) 51708 (typically 45 minutes face-to-face)  [] RE-EVAL     [x] KN(33324) x     [] Dry needle 1 or 2 Muscles (36658)  [x] NMR (78620) x     [] Dry needle 3+ Muscles (11367)  [x] Manual (59010) x     [] Ultrasound (08139) x  [] TA (67569) x     [] Mech Traction (40311)  [] ES(attended) (45846)     [] ES (un) (33744):   [] Vasopump (10104) [] Ionto (46641)   [] Other:    If BW Please Indicate Time In/Out  CPT Code Time in Time out                                   Approval Dates:  CPT Code Units Approved Units Used  Date Updated:                     Savannah Bright stated goal: recover rom, strength and function   []? Progressing: []? Met: []? Not Met: []? Adjusted     Therapist goals for Patient:   Short Term Goals: To be achieved in: 2 weeks  1. Independent in HEP and progression per patient tolerance, in order to prevent re-injury. []? Progressing: []? Met: []? Not Met: []? Adjusted        Long Term Goals: To be achieved in: 8-12 weeks  1. Disability index score of 30% or less for the Quick DASH to assist with reaching prior level of function. []? Progressing: []? Met: []? Not Met: []? Adjusted  2. Patient will demonstrate increased AROM to flexion 0-120, abduction 0-110, er 0-60 or better  to allow for proper joint functioning as indicated by Functional Deficits. []? Progressing: []? Met: []? Not Met: []? Adjusted  3. Patient will demonstrate an increase in NM recruitment/activation and overall GH and scapular strength to within 10 lbs HHD or WNL for proper functional mobility as indicated by patients Functional Deficits. []? Progressing: []? Met: []? Not Met: []? Adjusted  4. Patient will return to personal care, weight bearing and houshold  activities without increased symptoms or restriction. []? Progressing: []? Met: []? Not Met: []? Adjusted  5. Patient will have a decrease in pain 0-2/10 to facilitate improvement in movement, function, and ADLs as indicated by Functional Deficits. []? Progressing: []? Met: []? Not Met: []?  Adjusted    ASSESSMENT:  See eval    Treatment/Activity Tolerance:  [x] Patient tolerated treatment well [] Patient limited by fatique  [] Patient limited by pain  [] Patient limited by other medical complications  [] Other:     Overall Progression Towards Functional goals/ Treatment Progress Update:  [] Patient is progressing as expected towards functional goals listed. [x] Progression is slowed due to complexities/Impairments listed. [] Progression has been slowed due to co-morbidities. [] Plan just implemented, too soon to assess goals progression <30days   [] Goals require adjustment due to lack of progress  [] Patient is not progressing as expected and requires additional follow up with physician  [] Other    Prognosis for POC: [x] Good [] Fair  [] Poor    Patient requires continued skilled intervention: [x] Yes  [] No      PLAN: Reassess  Quick dash due   Increase thera ex, HEP, decrease man Rx   Follow [facility]  rTSA  protocol   [] Continue per plan of care [] Alter current plan (see comments)  [x] Plan of care initiated [] Hold pending MD visit [] Discharge    Electronically signed by: Estevan Mensah PT     Note: If patient does not return for scheduled/recommended follow up visits, this note will serve as a discharge from care along with the most recent update on progress.

## 2022-01-03 ENCOUNTER — OFFICE VISIT (OUTPATIENT)
Dept: ORTHOPEDIC SURGERY | Age: 72
End: 2022-01-03

## 2022-01-03 VITALS — RESPIRATION RATE: 16 BRPM | WEIGHT: 264 LBS | HEIGHT: 74 IN | BODY MASS INDEX: 33.88 KG/M2

## 2022-01-03 DIAGNOSIS — M19.012 ARTHRITIS OF LEFT SHOULDER REGION: Primary | ICD-10-CM

## 2022-01-03 PROCEDURE — 99024 POSTOP FOLLOW-UP VISIT: CPT | Performed by: PHYSICIAN ASSISTANT

## 2022-01-03 NOTE — PROGRESS NOTES
This is a pleasant 42-year-old gentleman who is here in follow-up for a reverse total shoulder of the left shoulder on 11/17/2021. All in all he is doing okay he deftly got pain reaching behind his back but he is showing progress over the last 4 to 6 weeks. On examination his incisions healing nicely no signs of infection is neurologically he is intact to the left hand. He has about 80 degrees of abduction and about 100 degrees of forward flexion passively. He has got good strength with internal and external rotation testing. Previous x-rays show overall good alignment of his left reverse total shoulder. My impression is stable healing left reverse total shoulder. We will have him continue with the stretching strengthening exercises and we will look for him to show some improvement over the next 4 weeks and then we will see him back in for repeat examination and x-rays    This dictation was done with Dragon dictation and may contain mechanical errors related to translation. Resp. rate 16, height 6' 2\" (1.88 m), weight 264 lb (119.7 kg).

## 2022-01-04 ENCOUNTER — APPOINTMENT (OUTPATIENT)
Dept: PHYSICAL THERAPY | Age: 72
End: 2022-01-04
Payer: MEDICARE

## 2022-01-06 ENCOUNTER — HOSPITAL ENCOUNTER (OUTPATIENT)
Dept: PHYSICAL THERAPY | Age: 72
Setting detail: THERAPIES SERIES
Discharge: HOME | End: 2022-01-06
Payer: MEDICARE

## 2022-01-06 PROCEDURE — 97110 THERAPEUTIC EXERCISES: CPT

## 2022-01-06 PROCEDURE — 97112 NEUROMUSCULAR REEDUCATION: CPT

## 2022-01-06 PROCEDURE — 97140 MANUAL THERAPY 1/> REGIONS: CPT

## 2022-01-06 NOTE — FLOWSHEET NOTE
[address] [facility] [address] 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    Insurance/Certification information:  PT Insurance Information: [facility] [employer]  Physician Information:  Referring Practitioner: Dr Daniel Gerardo of care signed (Y/N): routed 21    Date of Patient follow up with Physician:      Progress Report: []  Yes  [x]  No     Date Range for reporting period:  Beginnin2022  Ending:      Progress report due (10 Rx/or 30 days whichever is less):      Recertification due (POC duration/ or 90 days whichever is less):     Visit # POC/Insurance Allowable Auth Needed    24 visits thru 22 [place] [x]Yes    []No     Functional Outcomes Measure:   Date Assessed:  Test:  Score:     Pain level:  0/10     History of Injury: Patient stated complaint: H/O Left shoulder pain for several years  with resultant  L rTSA DOS 21, he retired and lives with wife in house, he is left hand dominant    SUBJECTIVE:  21: Patient reports shoulder is doing well,just stiffness. States he still sleeping in a recliner at home. 21: Patient reports shoulder is doing ok,minimal soreness. States he still sleeping in a recliner. 21 Pt reports most of the time having no shoulder pain but was sore this morning following a busy day yesterday. 21 Pt reports no pain today, he has been attempting to transition back to bed but recliner still feels more comfortable.    21 Pt reports no pain or problems with how his shoulder feels today. 12/30/21 Pt reports \"every day I can feel a little more range of motion\". 01/06/22 Patient reports shoulder is improving,using his left arm more for driving. States he still sleeping in a recliner. OBJECTIVE:    Observation:    Test measurements:    ROM:  Date      Shldr flexion    Shldr abd  Shldr IR         Shldr ER   A P A P A P A P   Eval           12/16/21 0-92 142 0-78 0-121 0-50 in scapular plane Not assessed  0-55 in scapular plane  65   12/23/21  145  125    65                           Strength:  Date Shoulder flexion Shoulder abduction Shoulder IR Shoulder  ER Bicep   Eval                                    RESTRICTIONS/PRECAUTIONS:     Exercises/Interventions:   Therapeutic Ex (76078)  Min: 15 Resistance/Reps Cues/Notes        gripper     Supine cane press  amparo well   Deltoid isometrics Flex, abd, ir and er 10 x ea     SL'ing [place]  Flexion with man assist 20 x              Mat ex          Supine scap     Incline table slide. 30 x                    Manual Intervention  (43000)  Min: 20     PROM left shoulder Left shoulder flex, scaption and ER    DTM/ MFR Left pectoralis mm, Lev scap., scalenes  and UT's     Stretch   To above muscles     Scap. Mobs  In [place] with ranger               NMR re-education (08377)  Min:15     Ball vs table 4 way Therapy ball 20 x ea Scapular retraction/ stability    [place] Standing vs table 10 x 2  Scapular retraction/ stability   Wax on / off 20 x ea  Scapular retraction/ stability and control    Supine scap protraction   Scapular stability/ eccentric control         Therapeutic Activity (27623)  Min:               Modalities:  Min     CP            Other Therapeutic Activities:  Pt was educated on PT POC, Diagnosis, Prognosis, pathomechanics as well as frequency and duration of scheduling future physical therapy appointments. Time was also taken on this day to answer all patient questions and participation in PT.  Reviewed appointment policy in detail with patient and patient verbalized understanding. Home Exercise Program:   HEP instruction: Access Code: [email or web address]: [place]aid/Date: 11/30/2021Prepared by: Oleksandr Pinon   · Seated Biceps Curl - 2 x daily - 7 x weekly - 3 sets - 10 reps   · Seated Forearm Pronation and Supination AROM - 2 x daily - 7 x weekly - 3 sets - 10 reps - 3 hold   · Seated Scapular Retraction - 2 x daily - 7 x weekly - 1 sets - 20 reps - 5 hold   · Circular Shoulder Pendulum with Table Support - 2 x daily - 7 x weekly - 1 sets - 20 reps   · Flexion-Extension Shoulder Pendulum with Table Support - 2 x daily - 7 x weekly - 1 sets - 20 reps   · Horizontal Shoulder Pendulum with Table Support - 2 x daily - 7 x weekly - 1 sets - 20 reps  The patient demonstrated good tolerance to and understanding of the HEP. Written instructions have been issued. 12/08/21 reviewed above exercises  Access Code: [email or web address]  URL: Plaid/  Date: 12/08/2021  Prepared by: Raymundo Mott    Exercises  Seated Shoulder Flexion Towel Slide at Table Top - 1 x daily - 7 x weekly - 3 sets - 15 reps - 5 hold      Access Code: [email or web address]: [email or web address]: 12/28/2021Prepared by: Austyn AlvarezExerckeith   Isometric Shoulder Abduction - 2 x daily - 7 x weekly - 2 sets - 10 reps - 5 hold   Isometric Shoulder Flexion - 2 x daily - 7 x weekly - 2 sets - 10 reps - 5 hold   Isometric Shoulder Internal Rotation - 2 x daily - 7 x weekly - 2 sets - 10 reps - 5 hold   Isometric Shoulder External Rotation - 2 x daily - 7 x weekly - 2 sets - 10 reps - 5 hold  The patient demonstrated good tolerance to and understanding of the HEP. Written instructions have been issued.     Therapeutic Exercise and NMR EXR  [] (76485) Provided verbal/tactile cueing for activities related to strengthening, flexibility, endurance, ROM  for improvements in scapular, scapulothoracic and UE control with self care, reaching, carrying, lifting, house/yardwork, driving/computer work.    [] (32649) Provided verbal/tactile cueing for activities related to improving balance, coordination, kinesthetic sense, posture, motor skill, proprioception  to assist with  scapular, scapulothoracic and UE control with self care, reaching, carrying, lifting, house/yardwork, driving/computer work. Therapeutic Activities:    [] (96288 or 98065) Provided verbal/tactile cueing for activities related to improving balance, coordination, kinesthetic sense, posture, motor skill, proprioception and motor activation to allow for proper function of scapular, scapulothoracic and UE control with self care, carrying, lifting, driving/computer work.      Home Exercise Program:    [x] (80627) Reviewed/Progressed HEP activities related to strengthening, flexibility, endurance, ROM of scapular, scapulothoracic and UE control with self care, reaching, carrying, lifting, house/yardwork, driving/computer work  [] (23427) Reviewed/Progressed HEP activities related to improving balance, coordination, kinesthetic sense, posture, motor skill, proprioception of scapular, scapulothoracic and UE control with self care, reaching, carrying, lifting, house/yardwork, driving/computer work      Manual Treatments:  PROM / STM / Oscillations-Mobs:  G-I, II, III, IV (PA's, Inf., Post.)  [] (95982) Provided manual therapy to mobilize soft tissue/joints of cervical/CT, scapular GHJ and UE for the purpose of modulating pain, promoting relaxation,  increasing ROM, reducing/eliminating soft tissue swelling/inflammation/restriction, improving soft tissue extensibility and allowing for proper ROM for normal function with self care, reaching, carrying, lifting, house/yardwork, driving/computer work    Charges:  Timed Code Treatment Minutes: 40   Total Treatment Minutes: 42       [] EVAL (LOW) 23205 (typically 20 minutes face-to-face)  [] EVAL (MOD) 61086 (typically 30 minutes face-to-face)  [] EVAL (HIGH) 35278 (typically 45 minutes face-to-face)  [] RE-EVAL     [x] BI(45417) x     [] Dry needle 1 or 2 Muscles (07750)  [x] NMR (20499) x     [] Dry needle 3+ Muscles (00611)  [x] Manual (66070) x     [] Ultrasound (91551) x  [] TA (76948) x     [] Mech Traction (23325)  [] ES(attended) (43052)     [] ES (un) (90821):   [] Vasopump (65428) [] Ionto (35757)   [] Other:    If BWC Please Indicate Time In/Out  CPT Code Time in Time out                                   Approval Dates:  CPT Code Units Approved Units Used  Date Updated:                     Elier Dexter stated goal: recover rom, strength and function   []? Progressing: []? Met: []? Not Met: []? Adjusted     Therapist goals for Patient:   Short Term Goals: To be achieved in: 2 weeks  1. Independent in HEP and progression per patient tolerance, in order to prevent re-injury. []? Progressing: []? Met: []? Not Met: []? Adjusted        Long Term Goals: To be achieved in: 8-12 weeks  1. Disability index score of 30% or less for the Quick DASH to assist with reaching prior level of function. []? Progressing: []? Met: []? Not Met: []? Adjusted  2. Patient will demonstrate increased AROM to flexion 0-120, abduction 0-110, er 0-60 or better  to allow for proper joint functioning as indicated by Functional Deficits. []? Progressing: []? Met: []? Not Met: []? Adjusted  3. Patient will demonstrate an increase in NM recruitment/activation and overall GH and scapular strength to within 10 lbs HHD or WNL for proper functional mobility as indicated by patients Functional Deficits. []? Progressing: []? Met: []? Not Met: []? Adjusted  4. Patient will return to personal care, weight bearing and houshold  activities without increased symptoms or restriction. []? Progressing: []? Met: []? Not Met: []? Adjusted  5.   Patient will have a decrease in pain 0-2/10 to facilitate improvement in movement, function, and ADLs as indicated by Functional Deficits. []? Progressing: []? Met: []? Not Met: []? Adjusted    ASSESSMENT:  See eval    Treatment/Activity Tolerance:  [x] Patient tolerated treatment well [] Patient limited by fatique  [] Patient limited by pain  [] Patient limited by other medical complications  [] Other:     Overall Progression Towards Functional goals/ Treatment Progress Update:  [] Patient is progressing as expected towards functional goals listed. [x] Progression is slowed due to complexities/Impairments listed. [] Progression has been slowed due to co-morbidities. [] Plan just implemented, too soon to assess goals progression <30days   [] Goals require adjustment due to lack of progress  [] Patient is not progressing as expected and requires additional follow up with physician  [] Other    Prognosis for POC: [x] Good [] Fair  [] Poor    Patient requires continued skilled intervention: [x] Yes  [] No      PLAN: Reassess  Quick dash due   Increase thera ex, HEP, decrease man Rx   Follow [facility]  rTSA  protocol   [] Continue per plan of care [] Alter current plan (see comments)  [x] Plan of care initiated [] Hold pending MD visit [] Discharge    Electronically signed by: Radha Cai PTA     Note: If patient does not return for scheduled/recommended follow up visits, this note will serve as a discharge from care along with the most recent update on progress.

## 2022-01-11 ENCOUNTER — HOSPITAL ENCOUNTER (OUTPATIENT)
Dept: PHYSICAL THERAPY | Age: 72
Setting detail: THERAPIES SERIES
Discharge: HOME | End: 2022-01-11
Payer: MEDICARE

## 2022-01-11 PROCEDURE — 97112 NEUROMUSCULAR REEDUCATION: CPT

## 2022-01-11 PROCEDURE — 97110 THERAPEUTIC EXERCISES: CPT

## 2022-01-11 PROCEDURE — 97140 MANUAL THERAPY 1/> REGIONS: CPT

## 2022-01-11 NOTE — FLOWSHEET NOTE
[address] [facility] 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    Insurance/Certification information:  PT Insurance Information: [facility] [employer] Medicare  Physician Information:  Referring Practitioner: Dr Cheryle Kling of care signed (Y/N): routed 21    Date of Patient follow up with Physician:      Progress Report: []  Yes  [x]  No     Date Range for reporting period:Quickda Eval= 37,  22 = 22  Beginnin2022  Ending:      Progress report due (10 Rx/or 30 days whichever is less):      Recertification due (POC duration/ or 90 days whichever is less):     Visit # POC/Insurance Allowable Auth Needed    24 visits thru  22 Cohere [x]Yes    []No     Functional Outcomes Measure:   Date Assessed:  Test:  Score:     Pain level:  0/10     History of Injury: Patient stated complaint: H/O Left shoulder pain for several years  with resultant  L rTSA DOS 21, he retired and lives with wife in house, he is left hand dominant    SUBJECTIVE:  21: Patient reports shoulder is doing well,just stiffness. States he still sleeping in a recliner at home. 21: Patient reports shoulder is doing ok,minimal soreness. States he still sleeping in a recliner. 21 Pt reports most of the time having no shoulder pain but was sore this morning following a busy day yesterday. 21 Pt reports no pain today, he has been attempting to transition back to bed but recliner still feels more comfortable. 12/28/21 Pt reports no pain or problems with how his shoulder feels today. 12/30/21 Pt reports \"every day I can feel a little more range of motion\". 01/06/22 Patient reports shoulder is improving,using his left arm more for driving. States he still sleeping in a recliner. 1/11/22 Patient reports no shoulder pain today. OBJECTIVE:    Observation:    Test measurements:    ROM:Left shoulder   Date      Shldr flexion    Shldr abd  Shldr IR         Shldr ER   A P A P A P A P   Eval           12/16/21 0-92 142 0-78 0-121 0-50 in scapular plane Not assessed  0-55 in scapular plane  65   12/23/21  145  125    65   1/11/22  150  140  55  65                Strength:  Date Shoulder flexion Shoulder abduction Shoulder IR Shoulder  ER Bicep   Eval                                    RESTRICTIONS/PRECAUTIONS:     Exercises/Interventions:   Therapeutic Ex (28544)  Min: 15 Resistance/Reps Cues/Notes   OH pulley  3 min     gripper     Supine cane press  amparo well   Deltoid isometrics    SL'ing [place]     T slide rows  Yellow 30 x  Cues to avoid pain    Cane assist  Flex. , scaption  ADD              Mat ex          Supine scap     Incline table slide. 30 x                    Manual Intervention  (58026)  Min: 20     PROM left shoulder Left shoulder flex, scaption and ER    DTM/ MFR Left pectoralis mm, Lev scap., scalenes  and UT's     Stretch   To above muscles     Scap.  Mobs  Resume>> I    Scar tissue mobs  Left shoulder          NMR re-education (23417)  Min:20      Ball vs table 4 way Therapy ball 20 x ea Scapular retraction/ stability    Scapular retraction vs therapy ball  5\" 20 x     [place] Standing  10 x 3  Scapular retraction/ stability   Wax on / off 20 x ea  Scapular retraction/ stability and control    Supine scap protraction  20 x  Scapular stability/ eccentric control         Therapeutic Activity (96861)  Min:               Modalities:  Min     CP            Other Therapeutic Activities:  Pt was educated on PT POC, Diagnosis, Prognosis, pathomechanics as well as frequency and duration of scheduling future physical therapy appointments. Time was also taken on this day to answer all patient questions and participation in PT. Reviewed appointment policy in detail with patient and patient verbalized understanding. Home Exercise Program:   HEP instruction: Access Code: [email or web address]: [employer]/Date: 11/30/2021Prepared by: Gideon Dus   · Seated Biceps Curl - 2 x daily - 7 x weekly - 3 sets - 10 reps   · Seated Forearm Pronation and Supination AROM - 2 x daily - 7 x weekly - 3 sets - 10 reps - 3 hold   · Seated Scapular Retraction - 2 x daily - 7 x weekly - 1 sets - 20 reps - 5 hold   · Circular Shoulder Pendulum with Table Support - 2 x daily - 7 x weekly - 1 sets - 20 reps   · Flexion-Extension Shoulder Pendulum with Table Support - 2 x daily - 7 x weekly - 1 sets - 20 reps   · Horizontal Shoulder Pendulum with Table Support - 2 x daily - 7 x weekly - 1 sets - 20 reps  The patient demonstrated good tolerance to and understanding of the HEP. Written instructions have been issued. 12/08/21 reviewed above exercises  Access Code: [email or web address]  URL: [email or web address]/  Date: 12/08/2021  Prepared by: Pauline Roberts    Exercises  Seated Shoulder Flexion Towel Slide at Table Top - 1 x daily - 7 x weekly - 3 sets - 15 reps - 5 hold      Access Code: [email or web address]: [email or web address] com/Date: 12/28/2021Prepared by: Anum AlvarezExelizabeth   Isometric Shoulder Abduction - 2 x daily - 7 x weekly - 2 sets - 10 reps - 5 hold   Isometric Shoulder Flexion - 2 x daily - 7 x weekly - 2 sets - 10 reps - 5 hold   Isometric Shoulder Internal Rotation - 2 x daily - 7 x weekly - 2 sets - 10 reps - 5 hold   Isometric Shoulder External Rotation - 2 x daily - 7 x weekly - 2 sets - 10 reps - 5 hold  The patient demonstrated good tolerance to and understanding of the HEP.  Written instructions have been issued. Therapeutic Exercise and NMR EXR  [] (70179) Provided verbal/tactile cueing for activities related to strengthening, flexibility, endurance, ROM  for improvements in scapular, scapulothoracic and UE control with self care, reaching, carrying, lifting, house/yardwork, driving/computer work.    [] (82496) Provided verbal/tactile cueing for activities related to improving balance, coordination, kinesthetic sense, posture, motor skill, proprioception  to assist with  scapular, scapulothoracic and UE control with self care, reaching, carrying, lifting, house/yardwork, driving/computer work. Therapeutic Activities:    [] (38454 or 20723) Provided verbal/tactile cueing for activities related to improving balance, coordination, kinesthetic sense, posture, motor skill, proprioception and motor activation to allow for proper function of scapular, scapulothoracic and UE control with self care, carrying, lifting, driving/computer work.      Home Exercise Program:    [x] (00240) Reviewed/Progressed HEP activities related to strengthening, flexibility, endurance, ROM of scapular, scapulothoracic and UE control with self care, reaching, carrying, lifting, house/yardwork, driving/computer work  [] (72988) Reviewed/Progressed HEP activities related to improving balance, coordination, kinesthetic sense, posture, motor skill, proprioception of scapular, scapulothoracic and UE control with self care, reaching, carrying, lifting, house/yardwork, driving/computer work      Manual Treatments:  PROM / STM / Oscillations-Mobs:  G-I, II, III, IV (PA's, Inf., Post.)  [] (80270) Provided manual therapy to mobilize soft tissue/joints of cervical/CT, scapular GHJ and UE for the purpose of modulating pain, promoting relaxation,  increasing ROM, reducing/eliminating soft tissue swelling/inflammation/restriction, improving soft tissue extensibility and allowing for proper ROM for normal function with self care, reaching, carrying, lifting, house/yardwork, driving/computer work    Charges:  Timed Code Treatment Minutes: 50   Total Treatment Minutes: 50       [] EVAL (LOW) 40647 (typically 20 minutes face-to-face)  [] EVAL (MOD) 87242 (typically 30 minutes face-to-face)  [] EVAL (HIGH) 69626 (typically 45 minutes face-to-face)  [] RE-EVAL     [x] YK(15744) x     [] Dry needle 1 or 2 Muscles (22973)  [x] NMR (33577) x     [] Dry needle 3+ Muscles (10502)  [x] Manual (25784) x     [] Ultrasound (05077) x  [] TA (28717) x     [] Mech Traction (14027)  [] ES(attended) (40721)     [] ES (un) (53325):   [] Vasopump (40853) [] Ionto (02544)   [] Other:    If [facility] Please Indicate Time In/Out  CPT Code Time in Time out                                   Approval Dates:  CPT Code Units Approved Units Used  Date Updated:                     Jose Sanchez stated goal: recover rom, strength and function   []? Progressing: []? Met: []? Not Met: []? Adjusted     Therapist goals for Patient:   Short Term Goals: To be achieved in: 2 weeks  1. Independent in HEP and progression per patient tolerance, in order to prevent re-injury. []? Progressing: []? Met: []? Not Met: []? Adjusted        Long Term Goals: To be achieved in: 8-12 weeks  1. Disability index score of 30% or less for the Quick DASH to assist with reaching prior level of function. []? Progressing: []? Met: []? Not Met: []? Adjusted  2. Patient will demonstrate increased AROM to flexion 0-120, abduction 0-110, er 0-60 or better  to allow for proper joint functioning as indicated by Functional Deficits. []? Progressing: []? Met: []? Not Met: []? Adjusted  3. Patient will demonstrate an increase in NM recruitment/activation and overall GH and scapular strength to within 10 lbs HHD or WNL for proper functional mobility as indicated by patients Functional Deficits. []? Progressing: []? Met: []? Not Met: []? Adjusted  4.  Patient will return to personal care, weight bearing and houshold  activities without increased symptoms or restriction. []? Progressing: []? Met: []? Not Met: []? Adjusted  5. Patient will have a decrease in pain 0-2/10 to facilitate improvement in movement, function, and ADLs as indicated by Functional Deficits. []? Progressing: []? Met: []? Not Met: []? Adjusted    ASSESSMENT:  See eval    Treatment/Activity Tolerance:  [x] Patient tolerated treatment well [] Patient limited by fatique  [] Patient limited by pain  [] Patient limited by other medical complications  [] Other:     Overall Progression Towards Functional goals/ Treatment Progress Update:  [] Patient is progressing as expected towards functional goals listed. [x] Progression is slowed due to complexities/Impairments listed. [] Progression has been slowed due to co-morbidities. [] Plan just implemented, too soon to assess goals progression <30days   [] Goals require adjustment due to lack of progress  [] Patient is not progressing as expected and requires additional follow up with physician  [] Other    Prognosis for POC: [x] Good [] Fair  [] Poor    Patient requires continued skilled intervention: [x] Yes  [] No      PLAN:              s  ADD Cane assist flex and scap. To HEP  Increase thera ex, HEP, decrease man Rx   Follow [facility] and [facility]  rTSA  protocol   [] Continue per plan of care [] Alter current plan (see comments)  [x] Plan of care initiated [] Hold pending MD visit [] Discharge    Electronically signed by: Richie Deal PT     Note: If patient does not return for scheduled/recommended follow up visits, this note will serve as a discharge from care along with the most recent update on progress.

## 2022-01-13 ENCOUNTER — HOSPITAL ENCOUNTER (OUTPATIENT)
Dept: PHYSICAL THERAPY | Age: 72
Setting detail: THERAPIES SERIES
Discharge: HOME | End: 2022-01-13
Payer: MEDICARE

## 2022-01-13 PROCEDURE — 97110 THERAPEUTIC EXERCISES: CPT

## 2022-01-13 PROCEDURE — 97140 MANUAL THERAPY 1/> REGIONS: CPT

## 2022-01-13 PROCEDURE — 97112 NEUROMUSCULAR REEDUCATION: CPT

## 2022-01-13 NOTE — FLOWSHEET NOTE
[address] [facility] 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    Insurance/Certification information:  PT Insurance Information: [facility] [employer] Medicare  Physician Information:  Referring Practitioner: Dr Hernandez Arms of care signed (Y/N): routed 21    Date of Patient follow up with Physician:      Progress Report: []  Yes  [x]  No     Date Range for reporting period:Quickda Eval= 37,  22 = 22  Beginnin2022  Ending:      Progress report due (10 Rx/or 30 days whichever is less):     Recertification due (POC duration/ or 90 days whichever is less):     Visit # POC/Insurance Allowable Auth Needed   10/24 24 visits thru  22 Cohere [x]Yes    []No     Functional Outcomes Measure:   Date Assessed:  Test:  Score:     Pain level:  0/10     History of Injury: Patient stated complaint: H/O Left shoulder pain for several years  with resultant  L rTSA DOS 21, he retired and lives with wife in house, he is left hand dominant    SUBJECTIVE:  21: Patient reports shoulder is doing well,just stiffness. States he still sleeping in a recliner at home. 21: Patient reports shoulder is doing ok,minimal soreness. States he still sleeping in a recliner. 21 Pt reports most of the time having no shoulder pain but was sore this morning following a busy day yesterday. 21 Pt reports no pain today, he has been attempting to transition back to bed but recliner still feels more comfortable. 12/28/21 Pt reports no pain or problems with how his shoulder feels today. 12/30/21 Pt reports \"every day I can feel a little more range of motion\". 01/06/22 Patient reports shoulder is improving,using his left arm more for driving. States he still sleeping in a recliner. 1/11/22 Patient reports no shoulder pain today. 1/13/22 Pt reports no shoulder or arm pain this am.     OBJECTIVE:    Observation:    Test measurements:    ROM:Left shoulder   Date      Shldr flexion    Shldr abd  Shldr IR         Shldr ER   A P A P A P A P   Eval           12/16/21 0-92 142 0-78 0-121 0-50 in scapular plane Not assessed  0-55 in scapular plane  65   12/23/21  145  125    65   1/11/22  150  140  55  65                                      Strength:  Date Shoulder flexion Shoulder abduction Shoulder IR Shoulder  ER Bicep   Eval                                    RESTRICTIONS/PRECAUTIONS:     Exercises/Interventions:   Therapeutic Ex (68272)  Min: 15 Resistance/Reps Cues/Notes   OH pulley  3 min     gripper     Supine cane press  amparo well   Deltoid isometrics    SL'ing [place]     T slide rows  Rows   Low Yellow 20 x   Elevated Yellow 20 x   Ext   Yellow 20 x   Cues to avoid pain         Cane assist  Flex. , scaption  10 x 2 ea  AddED TO HEP             Mat ex          Supine scap     Incline table slide. 30 x                    Manual Intervention  (43113)  Min: 20     PROM left shoulder Left shoulder flex, scaption and ER    DTM/ MFR Left pectoralis mm, Lev scap., scalenes  and UT's     Stretch   To above muscles     Scap.  Mobs  Resume>> I    Scar tissue mobs  Left shoulder          NMR re-education (25295)  Min:20      Ball vs table 4 way T Scapular retraction/ stability    Scapular retraction vs therapy ball  5\" 20 x     [place] Standing  10 x 3  Scapular retraction/ stability   Wax on / off 15 x 2 x ea  Scapular retraction/ stability and control    Supine scap protraction  1.5# 10 x 3 Scapular stability/ eccentric control Therapeutic Activity (87945)  Min:               Modalities:  Min     CP            Other Therapeutic Activities:  Pt was educated on PT POC, Diagnosis, Prognosis, pathomechanics as well as frequency and duration of scheduling future physical therapy appointments. Time was also taken on this day to answer all patient questions and participation in PT. Reviewed appointment policy in detail with patient and patient verbalized understanding. Home Exercise Program:   HEP instruction: Access Code: [email or web address]: [employer]nik/Date: 11/30/2021Prepared by: Oleksandr Pinon   · Seated Biceps Curl - 2 x daily - 7 x weekly - 3 sets - 10 reps   · Seated Forearm Pronation and Supination AROM - 2 x daily - 7 x weekly - 3 sets - 10 reps - 3 hold   · Seated Scapular Retraction - 2 x daily - 7 x weekly - 1 sets - 20 reps - 5 hold   · Circular Shoulder Pendulum with Table Support - 2 x daily - 7 x weekly - 1 sets - 20 reps   · Flexion-Extension Shoulder Pendulum with Table Support - 2 x daily - 7 x weekly - 1 sets - 20 reps   · Horizontal Shoulder Pendulum with Table Support - 2 x daily - 7 x weekly - 1 sets - 20 reps  The patient demonstrated good tolerance to and understanding of the HEP. Written instructions have been issued. 12/08/21 reviewed above exercises  Access Code: [email or web address]  URL: [email or web address]/  Date: 12/08/2021  Prepared by: Camille Jha    Exercises  Seated Shoulder Flexion Towel Slide at Table Top - 1 x daily - 7 x weekly - 3 sets - 15 reps - 5 hold      Access Code: [email or web address]: [email or web address] com/Date: 12/28/2021Prepared by: Austyn AlvarezExerckeith   Isometric Shoulder Abduction - 2 x daily - 7 x weekly - 2 sets - 10 reps - 5 hold   Isometric Shoulder Flexion - 2 x daily - 7 x weekly - 2 sets - 10 reps - 5 hold   Isometric Shoulder Internal Rotation - 2 x daily - 7 x weekly - 2 sets - 10 reps - 5 hold   Isometric Shoulder External Rotation - 2 x daily - 7 x weekly - 2 sets - 10 reps - 5 hold    Access Code: [email or web address]: [email or web address] com/Date: 01/13/2022Prepared by: Blanca Steinberg BloomExercises   Shoulder Scaption AAROM with [place] - 1 x daily - 7 x weekly - 2-3 sets - 10 reps - 3 hold   Seated Shoulder Flexion AAROM with [place] - 1 x daily - 7 x weekly - 2-3 sets - 10 reps - 3 hold    The patient demonstrated good tolerance to and understanding of the HEP. Written instructions have been issued. Therapeutic Exercise and NMR EXR  [] (51693) Provided verbal/tactile cueing for activities related to strengthening, flexibility, endurance, ROM  for improvements in scapular, scapulothoracic and UE control with self care, reaching, carrying, lifting, house/yardwork, driving/computer work.    [] (07884) Provided verbal/tactile cueing for activities related to improving balance, coordination, kinesthetic sense, posture, motor skill, proprioception  to assist with  scapular, scapulothoracic and UE control with self care, reaching, carrying, lifting, house/yardwork, driving/computer work. Therapeutic Activities:    [] (09811 or 41930) Provided verbal/tactile cueing for activities related to improving balance, coordination, kinesthetic sense, posture, motor skill, proprioception and motor activation to allow for proper function of scapular, scapulothoracic and UE control with self care, carrying, lifting, driving/computer work.      Home Exercise Program:    [x] (22824) Reviewed/Progressed HEP activities related to strengthening, flexibility, endurance, ROM of scapular, scapulothoracic and UE control with self care, reaching, carrying, lifting, house/yardwork, driving/computer work  [] (82453) Reviewed/Progressed HEP activities related to improving balance, coordination, kinesthetic sense, posture, motor skill, proprioception of scapular, scapulothoracic and UE control with self care, reaching, carrying, lifting, house/yardwork, driving/computer work      Manual Treatments:  PROM / STM / Oscillations-Mobs:  G-I, II, III, IV (PA's, Inf., Post.)  [] (68288) Provided manual therapy to mobilize soft tissue/joints of cervical/CT, scapular GHJ and UE for the purpose of modulating pain, promoting relaxation,  increasing ROM, reducing/eliminating soft tissue swelling/inflammation/restriction, improving soft tissue extensibility and allowing for proper ROM for normal function with self care, reaching, carrying, lifting, house/yardwork, driving/computer work    Charges:  Timed Code Treatment Minutes: 50   Total Treatment Minutes: 50       [] EVAL (LOW) 75183 (typically 20 minutes face-to-face)  [] EVAL (MOD) 76581 (typically 30 minutes face-to-face)  [] EVAL (HIGH) 02041 (typically 45 minutes face-to-face)  [] RE-EVAL     [x] RQ(41590) x     [] Dry needle 1 or 2 Muscles (67703)  [x] NMR (20071) x     [] Dry needle 3+ Muscles (09674)  [x] Manual (03140) x     [] Ultrasound (28857) x  [] TA (29565) x     [] Mech Traction (22316)  [] ES(attended) (38534)     [] ES (un) (65202):   [] Vasopump (43231) [] Ionto (67874)   [] Other:    If [facility] Please Indicate Time In/Out  CPT Code Time in Time out                                   Approval Dates:  CPT Code Units Approved Units Used  Date Updated:                     Jose Sanchez stated goal: recover rom, strength and function   []? Progressing: []? Met: []? Not Met: []? Adjusted     Therapist goals for Patient:   Short Term Goals: To be achieved in: 2 weeks  1. Independent in HEP and progression per patient tolerance, in order to prevent re-injury. []? Progressing: []? Met: []? Not Met: []? Adjusted        Long Term Goals: To be achieved in: 8-12 weeks  1. Disability index score of 30% or less for the Quick DASH to assist with reaching prior level of function. []? Progressing: []? Met: []? Not Met: []? Adjusted  2.  Patient will demonstrate increased AROM to flexion 0-120, abduction 0-110, er 0-60 or better  to allow for proper joint functioning as indicated by Functional Deficits. []? Progressing: []? Met: []? Not Met: []? Adjusted  3. Patient will demonstrate an increase in NM recruitment/activation and overall GH and scapular strength to within 10 lbs HHD or WNL for proper functional mobility as indicated by patients Functional Deficits. []? Progressing: []? Met: []? Not Met: []? Adjusted  4. Patient will return to personal care, weight bearing and houshold  activities without increased symptoms or restriction. []? Progressing: []? Met: []? Not Met: []? Adjusted  5. Patient will have a decrease in pain 0-2/10 to facilitate improvement in movement, function, and ADLs as indicated by Functional Deficits. []? Progressing: []? Met: []? Not Met: []? Adjusted    ASSESSMENT:  See eval    Treatment/Activity Tolerance:  [x] Patient tolerated treatment well [] Patient limited by fatique  [] Patient limited by pain  [] Patient limited by other medical complications  [] Other:     Overall Progression Towards Functional goals/ Treatment Progress Update:  [] Patient is progressing as expected towards functional goals listed. [x] Progression is slowed due to complexities/Impairments listed. [] Progression has been slowed due to co-morbidities.   [] Plan just implemented, too soon to assess goals progression <30days   [] Goals require adjustment due to lack of progress  [] Patient is not progressing as expected and requires additional follow up with physician  [] Other    Prognosis for POC: [x] Good [] Fair  [] Poor    Patient requires continued skilled intervention: [x] Yes  [] No      PLAN:              s    Increase thera ex, HEP, decrease man Rx   Follow [facility] and [facility]  rTSA  protocol   [] Continue per plan of care [] Alter current plan (see comments)  [x] Plan of care initiated [] Hold pending MD visit [] Discharge    Electronically signed by: Timi Lynn PT     Note: If patient does not return for scheduled/recommended follow up visits, this note will serve as a discharge from care along with the most recent update on progress.

## 2022-01-18 ENCOUNTER — HOSPITAL ENCOUNTER (OUTPATIENT)
Dept: PHYSICAL THERAPY | Age: 72
Setting detail: THERAPIES SERIES
Discharge: HOME | End: 2022-01-18
Payer: MEDICARE

## 2022-01-18 PROCEDURE — 97140 MANUAL THERAPY 1/> REGIONS: CPT

## 2022-01-18 PROCEDURE — 97110 THERAPEUTIC EXERCISES: CPT

## 2022-01-18 PROCEDURE — 97112 NEUROMUSCULAR REEDUCATION: CPT

## 2022-01-18 NOTE — FLOWSHEET NOTE
[address] [facility] 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    Insurance/Certification information:  PT Insurance Information: [address] Medicare  Physician Information:  Referring Practitioner: Dr Abbey Velázquez of care signed (Y/N): routed 21    Date of Patient follow up with Physician:      Progress Report: []  Yes  [x]  No     Date Range for reporting period:Quickda Eval= 37,  22 = 22  Beginnin2022  Ending:      Progress report due (10 Rx/or 30 days whichever is less):     Recertification due (POC duration/ or 90 days whichever is less):     Visit # POC/Insurance Allowable Auth Needed    24 visits thru  22 Cohere [x]Yes    []No     Functional Outcomes Measure:   Date Assessed:  Test:  Score:     Pain level:  10     History of Injury: Patient stated complaint: H/O Left shoulder pain for several years  with resultant  L rTSA DOS 21, he retired and lives with wife in house, he is left hand dominant    SUBJECTIVE:  21: Patient reports shoulder is doing well,just stiffness. States he still sleeping in a recliner at home. 21: Patient reports shoulder is doing ok,minimal soreness. States he still sleeping in a recliner. 21 Pt reports most of the time having no shoulder pain but was sore this morning following a busy day yesterday. 21 Pt reports no pain today, he has been attempting to transition back to bed but recliner still feels more comfortable. 12/28/21 Pt reports no pain or problems with how his shoulder feels today. 12/30/21 Pt reports \"every day I can feel a little more range of motion\". 01/06/22 Patient reports shoulder is improving,using his left arm more for driving. States he still sleeping in a recliner. 1/11/22 Patient reports no shoulder pain today. 1/13/22 Pt reports no shoulder or arm pain this am.   01/18/22 Patient reports shoulder was a little sore after he moved some light furniture in his [address] OBJECTIVE:    Observation:    Test measurements:    ROM:Left shoulder   Date      Shldr flexion    Shldr abd  Shldr IR         Shldr ER   A P A P A P A P   Eval           12/16/21 0-92 142 0-78 0-121 0-50 in scapular plane Not assessed  0-55 in scapular plane  65   12/23/21  145  125    65   1/11/22  150  140  55  65                                      Strength:  Date Shoulder flexion Shoulder abduction Shoulder IR Shoulder  ER Bicep   Eval                                    RESTRICTIONS/PRECAUTIONS:     Exercises/Interventions:   Therapeutic Ex (32059)  Min: 15 Resistance/Reps Cues/Notes   OH pulley  3 min     gripper     Supine cane press  amparo well   Deltoid isometrics    SL'ing [place]     T slide rows  Rows   Low Yellow 20 x   Elevated Yellow 20 x   Ext   Yellow 20 x   Cues to avoid pain         Cane assist  Flex. , scaption  10 x 2 ea  AddED TO HEP             Mat ex          Supine scap     Incline table slide. 30 x                    Manual Intervention  (81115)  Min: 20     PROM left shoulder Left shoulder flex, scaption and ER    DTM/ MFR Left pectoralis mm, Lev scap., scalenes  and UT's     Stretch   To above muscles     Scap.  Mobs  Resume>> I    Scar tissue mobs  Left shoulder          NMR re-education (36174)  Min:20      Ball vs table 4 way T Scapular retraction/ stability    Scapular retraction vs therapy ball  5\" 20 x     Morley Standing  10 x 3  Scapular retraction/ stability   Wax on / off 15 x 2 x ea  Scapular retraction/ stability and control    Supine scap protraction  1.5# 10 x 3 Scapular stability/ eccentric control         Therapeutic Activity (46712)  Min:               Modalities:  Min     CP            Other Therapeutic Activities:  Pt was educated on PT POC, Diagnosis, Prognosis, pathomechanics as well as frequency and duration of scheduling future physical therapy appointments. Time was also taken on this day to answer all patient questions and participation in PT. Reviewed appointment policy in detail with patient and patient verbalized understanding. Home Exercise Program:   HEP instruction: Access Code: [email or web address]: IntooBR/Date: 11/30/2021Prepared by: Mabel Varghese   · Seated Biceps Curl - 2 x daily - 7 x weekly - 3 sets - 10 reps   · Seated Forearm Pronation and Supination AROM - 2 x daily - 7 x weekly - 3 sets - 10 reps - 3 hold   · Seated Scapular Retraction - 2 x daily - 7 x weekly - 1 sets - 20 reps - 5 hold   · Circular Shoulder Pendulum with Table Support - 2 x daily - 7 x weekly - 1 sets - 20 reps   · Flexion-Extension Shoulder Pendulum with Table Support - 2 x daily - 7 x weekly - 1 sets - 20 reps   · Horizontal Shoulder Pendulum with Table Support - 2 x daily - 7 x weekly - 1 sets - 20 reps  The patient demonstrated good tolerance to and understanding of the HEP. Written instructions have been issued. 12/08/21 reviewed above exercises  Access Code: [email or web address]  URL: [email or web address]/  Date: 12/08/2021  Prepared by: Alise Min    Exercises  Seated Shoulder Flexion Towel Slide at Table Top - 1 x daily - 7 x weekly - 3 sets - 15 reps - 5 hold      Access Code: [email or web address]: [email or web address]. com/Date: 12/28/2021Prepared by: April Mo BloomExercises   Isometric Shoulder Abduction - 2 x daily - 7 x weekly - 2 sets - 10 reps - 5 hold   Isometric Shoulder Flexion - 2 x daily - 7 x weekly - 2 sets - 10 reps - 5 hold   Isometric Shoulder Internal Rotation - 2 x daily - 7 x weekly - 2 sets - 10 reps - 5 hold   Isometric Shoulder External Rotation - 2 x daily - 7 x weekly - 2 sets - 10 reps - 5 hold    Access Code: [email or web address]: [email or web address]. com/Date: 01/13/2022Prepared by: Filipe Adams KimExerckeith   Shoulder Scaption AAROM with Dowel - 1 x daily - 7 x weekly - 2-3 sets - 10 reps - 3 hold   Seated Shoulder Flexion AAROM with Dowel - 1 x daily - 7 x weekly - 2-3 sets - 10 reps - 3 hold    The patient demonstrated good tolerance to and understanding of the HEP. Written instructions have been issued. Therapeutic Exercise and NMR EXR  [] (00425) Provided verbal/tactile cueing for activities related to strengthening, flexibility, endurance, ROM  for improvements in scapular, scapulothoracic and UE control with self care, reaching, carrying, lifting, house/yardwork, driving/computer work.    [] (93066) Provided verbal/tactile cueing for activities related to improving balance, coordination, kinesthetic sense, posture, motor skill, proprioception  to assist with  scapular, scapulothoracic and UE control with self care, reaching, carrying, lifting, house/yardwork, driving/computer work. Therapeutic Activities:    [] (77846 or 53031) Provided verbal/tactile cueing for activities related to improving balance, coordination, kinesthetic sense, posture, motor skill, proprioception and motor activation to allow for proper function of scapular, scapulothoracic and UE control with self care, carrying, lifting, driving/computer work.      Home Exercise Program:    [x] (05399) Reviewed/Progressed HEP activities related to strengthening, flexibility, endurance, ROM of scapular, scapulothoracic and UE control with self care, reaching, carrying, lifting, house/yardwork, driving/computer work  [] (63539) Reviewed/Progressed HEP activities related to improving balance, coordination, kinesthetic sense, posture, motor skill, proprioception of scapular, scapulothoracic and UE control increased AROM to flexion 0-120, abduction 0-110, er 0-60 or better  to allow for proper joint functioning as indicated by Functional Deficits. []? Progressing: []? Met: []? Not Met: []? Adjusted  3. Patient will demonstrate an increase in NM recruitment/activation and overall GH and scapular strength to within 10 lbs HHD or WNL for proper functional mobility as indicated by patients Functional Deficits. []? Progressing: []? Met: []? Not Met: []? Adjusted  4. Patient will return to personal care, weight bearing and houshold  activities without increased symptoms or restriction. []? Progressing: []? Met: []? Not Met: []? Adjusted  5. Patient will have a decrease in pain 0-2/10 to facilitate improvement in movement, function, and ADLs as indicated by Functional Deficits. []? Progressing: []? Met: []? Not Met: []? Adjusted    ASSESSMENT:  See eval    Treatment/Activity Tolerance:  [x] Patient tolerated treatment well [] Patient limited by fatique  [] Patient limited by pain  [] Patient limited by other medical complications  [] Other:     Overall Progression Towards Functional goals/ Treatment Progress Update:  [] Patient is progressing as expected towards functional goals listed. [x] Progression is slowed due to complexities/Impairments listed. [] Progression has been slowed due to co-morbidities.   [] Plan just implemented, too soon to assess goals progression <30days   [] Goals require adjustment due to lack of progress  [] Patient is not progressing as expected and requires additional follow up with physician  [] Other    Prognosis for POC: [x] Good [] Fair  [] Poor    Patient requires continued skilled intervention: [x] Yes  [] No      PLAN:              s    Increase thera ex, HEP, decrease man Rx   Follow [facility]  rTSA  protocol   [] Continue per plan of care [] Alter current plan (see comments)  [x] Plan of care initiated [] Hold pending MD visit [] Discharge    Electronically signed by: Ambre Pearson PTA     Note: If patient does not return for scheduled/recommended follow up visits, this note will serve as a discharge from care along with the most recent update on progress.

## 2022-01-20 ENCOUNTER — HOSPITAL ENCOUNTER (OUTPATIENT)
Dept: PHYSICAL THERAPY | Age: 72
Setting detail: THERAPIES SERIES
Discharge: HOME | End: 2022-01-20
Payer: MEDICARE

## 2022-01-20 PROCEDURE — 97110 THERAPEUTIC EXERCISES: CPT

## 2022-01-20 PROCEDURE — 97112 NEUROMUSCULAR REEDUCATION: CPT

## 2022-01-20 PROCEDURE — 97140 MANUAL THERAPY 1/> REGIONS: CPT

## 2022-01-20 NOTE — FLOWSHEET NOTE
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    Insurance/Certification information:  PT Insurance Information: [facility] [employer] Medicare  Physician Information:  Referring Practitioner: Dr Patty Pace of care signed (Y/N): routed 21    Date of Patient follow up with Physician:      Progress Report: []  Yes  [x]  No     Date Range for reporting period:Quickda Mari= 37,  22 = 22  Beginnin2022  Ending:      Progress report due (10 Rx/or 30 days whichever is less):     Recertification due (POC duration/ or 90 days whichever is less):     Visit # POC/Insurance Allowable Auth Needed    24 visits thru  22 Cohere [x]Yes    []No     Functional Outcomes Measure:   Date Assessed:  Test:  Score:     Pain level:  /10     History of Injury: Patient stated complaint: H/O Left shoulder pain for several years  with resultant  L rTSA DOS 21, he retired and lives with wife in house, he is left hand dominant    SUBJECTIVE:  21: Patient reports shoulder is doing well,just stiffness. States he still sleeping in a recliner at home. 21: Patient reports shoulder is doing ok,minimal soreness. States he still sleeping in a recliner. 21 Pt reports most of the time having no shoulder pain but was sore this morning following a busy day yesterday. 21 Pt reports no pain today, he has been attempting to transition back to bed but recliner still feels more comfortable. 12/28/21 Pt reports no pain or problems with how his shoulder feels today. 12/30/21 Pt reports \"every day I can feel a little more range of motion\". 01/06/22 Patient reports shoulder is improving,using his left arm more for driving. States he still sleeping in a recliner. 1/11/22 Patient reports no shoulder pain today. 1/13/22 Pt reports no shoulder or arm pain this am.   01/18/22 Patient reports shoulder was a little sore after he moved some light furniture in his [address] 01/20/22 Patient reports shoulder has been doing well. OBJECTIVE:    Observation:    Test measurements:    ROM:Left shoulder   Date      Shldr flexion    Shldr abd  Shldr IR         Shldr ER   A P A P A P A P   Eval           12/16/21 0-92 142 0-78 0-121 0-50 in scapular plane Not assessed  0-55 in scapular plane  65   12/23/21  145  125    65   1/11/22  150  140  55  65                                      Strength:  Date Shoulder flexion Shoulder abduction Shoulder IR Shoulder  ER Bicep   Eval                                    RESTRICTIONS/PRECAUTIONS:     Exercises/Interventions:   Therapeutic Ex (53406)  Min: 15 Resistance/Reps Cues/Notes   OH pulley  3 min     gripper     Supine cane press  amparo well   Deltoid isometrics    SL'ing Norris     T slide rows  Rows   Low Yellow 20 x   Elevated Yellow 20 x   Ext   Yellow 20 x   Cues to avoid pain         Cane assist  Flex. , scaption  10 x 2 ea  AddED TO HEP             Mat ex          Supine scap     Incline table slide. 30 x                    Manual Intervention  (98018)  Min: 20     PROM left shoulder Left shoulder flex, scaption and ER    DTM/ MFR Left pectoralis mm, Lev scap., scalenes  and UT's     Stretch   To above muscles     Scap.  Mobs  Resume>> I    Scar tissue mobs  Left shoulder          NMR re-education (21987)  Min:20      Ball vs table 4 way T Scapular retraction/ stability    Scapular retraction vs therapy ball  5\" 20 x     Norris Standing  10 x 3  Scapular retraction/ stability   Wax on / off 15 x 2 x ea  Scapular retraction/ stability and control    Supine scap protraction  2# 10 x 3 Scapular stability/ eccentric control         Therapeutic Activity (15296)  Min:               Modalities:  Min     CP            Other Therapeutic Activities:  Pt was educated on PT POC, Diagnosis, Prognosis, pathomechanics as well as frequency and duration of scheduling future physical therapy appointments. Time was also taken on this day to answer all patient questions and participation in PT. Reviewed appointment policy in detail with patient and patient verbalized understanding. Home Exercise Program:   HEP instruction: Access Code: [email or web address]: [employer]/Date: 11/30/2021Prepared by: Elizabeth Held   · Seated Biceps Curl - 2 x daily - 7 x weekly - 3 sets - 10 reps   · Seated Forearm Pronation and Supination AROM - 2 x daily - 7 x weekly - 3 sets - 10 reps - 3 hold   · Seated Scapular Retraction - 2 x daily - 7 x weekly - 1 sets - 20 reps - 5 hold   · Circular Shoulder Pendulum with Table Support - 2 x daily - 7 x weekly - 1 sets - 20 reps   · Flexion-Extension Shoulder Pendulum with Table Support - 2 x daily - 7 x weekly - 1 sets - 20 reps   · Horizontal Shoulder Pendulum with Table Support - 2 x daily - 7 x weekly - 1 sets - 20 reps  The patient demonstrated good tolerance to and understanding of the HEP. Written instructions have been issued. 12/08/21 reviewed above exercises  Access Code: [email or web address]  URL: [employer]/  Date: 12/08/2021  Prepared by: Aviva Carl    Exercises  Seated Shoulder Flexion Towel Slide at Table Top - 1 x daily - 7 x weekly - 3 sets - 15 reps - 5 hold      Access Code: [email or web address]: [email or web address]. com/Date: 12/28/2021Prepared by: Teresita Medici BloomExercises   Isometric Shoulder Abduction - 2 x daily - 7 x weekly - 2 sets - 10 reps - 5 hold   Isometric Shoulder Flexion - 2 x daily - 7 x weekly - 2 sets - 10 reps - 5 hold   Isometric Shoulder Internal Rotation - 2 x daily - 7 x weekly - 2 sets - 10 reps - 5 hold   Isometric Shoulder External Rotation - 2 x daily - 7 x weekly - 2 sets - 10 reps - 5 hold    Access Code: [email or web address]: [email or web address] com/Date: 01/13/2022Prepared by: Layvonne Player BloomExercises   Shoulder Scaption AAROM with Dowel - 1 x daily - 7 x weekly - 2-3 sets - 10 reps - 3 hold   Seated Shoulder Flexion AAROM with Dowel - 1 x daily - 7 x weekly - 2-3 sets - 10 reps - 3 hold    The patient demonstrated good tolerance to and understanding of the HEP. Written instructions have been issued. Therapeutic Exercise and NMR EXR  [] (07034) Provided verbal/tactile cueing for activities related to strengthening, flexibility, endurance, ROM  for improvements in scapular, scapulothoracic and UE control with self care, reaching, carrying, lifting, house/yardwork, driving/computer work.    [] (94730) Provided verbal/tactile cueing for activities related to improving balance, coordination, kinesthetic sense, posture, motor skill, proprioception  to assist with  scapular, scapulothoracic and UE control with self care, reaching, carrying, lifting, house/yardwork, driving/computer work. Therapeutic Activities:    [] (45073 or 16584) Provided verbal/tactile cueing for activities related to improving balance, coordination, kinesthetic sense, posture, motor skill, proprioception and motor activation to allow for proper function of scapular, scapulothoracic and UE control with self care, carrying, lifting, driving/computer work.      Home Exercise Program:    [x] (08105) Reviewed/Progressed HEP activities related to strengthening, flexibility, endurance, ROM of scapular, scapulothoracic and UE control with self care, reaching, carrying, lifting, house/yardwork, driving/computer work  [] (43078) Reviewed/Progressed HEP activities related to improving balance, coordination, kinesthetic sense, posture, motor skill, proprioception of scapular, scapulothoracic and UE control with self care, reaching, carrying, lifting, house/yardwork, driving/computer work      Manual Treatments:  PROM / STM / Oscillations-Mobs:  G-I, II, III, IV ([place], Inf., Post.)  [] (12557) Provided manual therapy to mobilize soft tissue/joints of cervical/CT, scapular GHJ and UE for the purpose of modulating pain, promoting relaxation,  increasing ROM, reducing/eliminating soft tissue swelling/inflammation/restriction, improving soft tissue extensibility and allowing for proper ROM for normal function with self care, reaching, carrying, lifting, house/yardwork, driving/computer work    Charges:  Timed Code Treatment Minutes: 50   Total Treatment Minutes: 50       [] EVAL (LOW) 02033 (typically 20 minutes face-to-face)  [] EVAL (MOD) 58533 (typically 30 minutes face-to-face)  [] EVAL (HIGH) 01044 (typically 45 minutes face-to-face)  [] RE-EVAL     [x] IM(84657) x     [] Dry needle 1 or 2 Muscles (68288)  [x] NMR (26195) x     [] Dry needle 3+ Muscles (92101)  [x] Manual (97340) x     [] Ultrasound (40596) x  [] TA (91196) x     [] Mech Traction (76129)  [] ES(attended) (71458)     [] ES (un) (05494):   [] Vasopump (43033) [] Ionto (63164)   [] Other:    If [facility] Please Indicate Time In/Out  CPT Code Time in Time out                                   Approval Dates:  CPT Code Units Approved Units Used  Date Updated:                     Shivam Corporal stated goal: recover rom, strength and function   []? Progressing: []? Met: []? Not Met: []? Adjusted     Therapist goals for Patient:   Short Term Goals: To be achieved in: 2 weeks  1. Independent in HEP and progression per patient tolerance, in order to prevent re-injury. []? Progressing: []? Met: []? Not Met: []? Adjusted        Long Term Goals: To be achieved in: 8-12 weeks  1. Disability index score of 30% or less for the Quick DASH to assist with reaching prior level of function. []? Progressing: []?  Met: []? Not Met: []? Adjusted  2. Patient will demonstrate increased AROM to flexion 0-120, abduction 0-110, er 0-60 or better  to allow for proper joint functioning as indicated by Functional Deficits. []? Progressing: []? Met: []? Not Met: []? Adjusted  3. Patient will demonstrate an increase in NM recruitment/activation and overall GH and scapular strength to within 10 lbs HHD or WNL for proper functional mobility as indicated by patients Functional Deficits. []? Progressing: []? Met: []? Not Met: []? Adjusted  4. Patient will return to personal care, weight bearing and houshold  activities without increased symptoms or restriction. []? Progressing: []? Met: []? Not Met: []? Adjusted  5. Patient will have a decrease in pain 0-2/10 to facilitate improvement in movement, function, and ADLs as indicated by Functional Deficits. []? Progressing: []? Met: []? Not Met: []? Adjusted    ASSESSMENT:  See eval    Treatment/Activity Tolerance:  [x] Patient tolerated treatment well [] Patient limited by fatique  [] Patient limited by pain  [] Patient limited by other medical complications  [] Other:     Overall Progression Towards Functional goals/ Treatment Progress Update:  [] Patient is progressing as expected towards functional goals listed. [x] Progression is slowed due to complexities/Impairments listed. [] Progression has been slowed due to co-morbidities.   [] Plan just implemented, too soon to assess goals progression <30days   [] Goals require adjustment due to lack of progress  [] Patient is not progressing as expected and requires additional follow up with physician  [] Other    Prognosis for POC: [x] Good [] Fair  [] Poor    Patient requires continued skilled intervention: [x] Yes  [] No      PLAN:              s    Increase thera ex, HEP, decrease man Rx   Follow [facility]  rTSA  protocol   [] Continue per plan of care [] Alter current plan (see comments)  [x] Plan of care initiated [] Hold pending MD visit [] Discharge    Electronically signed by: Swathi Downing PTA     Note: If patient does not return for scheduled/recommended follow up visits, this note will serve as a discharge from care along with the most recent update on progress.

## 2022-01-25 ENCOUNTER — HOSPITAL ENCOUNTER (OUTPATIENT)
Dept: PHYSICAL THERAPY | Age: 72
Setting detail: THERAPIES SERIES
Discharge: HOME | End: 2022-01-25
Payer: MEDICARE

## 2022-01-25 PROCEDURE — 97112 NEUROMUSCULAR REEDUCATION: CPT

## 2022-01-25 PROCEDURE — 97140 MANUAL THERAPY 1/> REGIONS: CPT

## 2022-01-25 PROCEDURE — 97110 THERAPEUTIC EXERCISES: CPT

## 2022-01-25 NOTE — FLOWSHEET NOTE
[address] 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    Insurance/Certification information:  PT Insurance Information: [facility] [employer] Medicare  Physician Information:  Referring Practitioner: Dr Graham Gilbert of care signed (Y/N): routed 21    Date of Patient follow up with Physician:      Progress Report: []  Yes  [x]  No     Date Range for reporting period:Quickda Eval= 37,  22 = 22  Beginnin2022  Ending:      Progress report due (10 Rx/or 30 days whichever is less): 3/89/79    Recertification due (POC duration/ or 90 days whichever is less):     Visit # POC/Insurance Allowable Auth Needed    24 visits thru  22 [facility]here [x]Yes    []No     Functional Outcomes Measure:   Date Assessed:  Test:  Score:     Pain level:  1/10     History of Injury: Patient stated complaint: H/O Left shoulder pain for several years  with resultant  L rTSA DOS 21, he retired and lives with wife in house, he is left hand dominant    SUBJECTIVE:  21: Patient reports shoulder is doing well,just stiffness. States he still sleeping in a recliner at home. 21: Patient reports shoulder is doing ok,minimal soreness. States he still sleeping in a recliner. 21 Pt reports most of the time having no shoulder pain but was sore this morning following a busy day yesterday. 21 Pt reports no pain today, he has been attempting to transition back to bed but recliner still feels more comfortable. 12/28/21 Pt reports no pain or problems with how his shoulder feels today. 12/30/21 Pt reports \"every day I can feel a little more range of motion\". 01/06/22 Patient reports shoulder is improving,using his left arm more for driving. States he still sleeping in a recliner. 1/11/22 Patient reports no shoulder pain today. 1/13/22 Pt reports no shoulder or arm pain this am.   01/18/22 Patient reports shoulder was a little sore after he moved some light furniture in his [address] 01/20/22 Patient reports shoulder has been doing well. 1/25/22 Pt reports no pain today. He would like to continue PT to ensure ability to reach behind his back with his left arm. OBJECTIVE:    Observation:    Test measurements:    ROM:Left shoulder   Date      Shldr flexion    Shldr abd  Shldr IR         Shldr ER   A P A P A P A P   Eval           12/16/21 0-92 142 0-78 0-121 0-50 in scapular plane Not assessed  0-55 in scapular plane  65   12/23/21  145  125    65   1/11/22  150  140  55  65   1/25/22  142 158 121 145  55 56 65                           Strength:  Date Shoulder flexion Shoulder abduction Shoulder IR Shoulder  ER Bicep   Eval        1/25/22                            RESTRICTIONS/PRECAUTIONS:     Exercises/Interventions:   Therapeutic Ex (41288)  Min: 15 Resistance/Reps Cues/Notes   OH pulley  3 min     gripper     Supine cane press  amparo well   Deltoid isometrics    SL'ing [place]     T slide rows  Rows   Low Yellow 20 x   Elevated Black 30 x   Ext   Yellow 20 x   Cues to avoid pain         Cane assist  Flex. , scaption  10 x 2 ea  AddED TO HEP             Mat ex          Supine scap     Incline table slide. Manual Intervention  (97611)  Min: 20     PROM left shoulder Left shoulder flex, scaption and ER    DTM/ MFR Left pectoralis mm, Lev scap., scalenes  and UT's     Stretch   To above muscles     Scap.  Mobs  Resume>> I    Scar tissue mobs  Left shoulder          NMR re-education (44912)  Min:20        Scapular retraction/ stability    Scapular retraction vs therapy ball      [place] Standing  Flexion and Sscaption 3\" hold 15 x ea  Scapular retraction/ stability/ eccentric control    Wax on / off 20 x ea  Scapular retraction/ stability and control    Supine scap protraction  4# 20 x  Scapular stability/ eccentric control         Therapeutic Activity (05740)  Min:               Modalities:  Min     CP            Other Therapeutic Activities:  Pt was educated on PT POC, Diagnosis, Prognosis, pathomechanics as well as frequency and duration of scheduling future physical therapy appointments. Time was also taken on this day to answer all patient questions and participation in PT. Reviewed appointment policy in detail with patient and patient verbalized understanding. Home Exercise Program:   HEP instruction: Access Code: [email or web address]: [employer]/Date: 11/30/2021Prepared by: Romana Hough   · Seated Biceps Curl - 2 x daily - 7 x weekly - 3 sets - 10 reps   · Seated Forearm Pronation and Supination AROM - 2 x daily - 7 x weekly - 3 sets - 10 reps - 3 hold   · Seated Scapular Retraction - 2 x daily - 7 x weekly - 1 sets - 20 reps - 5 hold   · Circular Shoulder Pendulum with Table Support - 2 x daily - 7 x weekly - 1 sets - 20 reps   · Flexion-Extension Shoulder Pendulum with Table Support - 2 x daily - 7 x weekly - 1 sets - 20 reps   · Horizontal Shoulder Pendulum with Table Support - 2 x daily - 7 x weekly - 1 sets - 20 reps  The patient demonstrated good tolerance to and understanding of the HEP. Written instructions have been issued. 12/08/21 reviewed above exercises  Access Code: [email or web address]  URL: [email or web address]  Date: 12/08/2021  Prepared by: Georgina Bell    Exercises  Seated Shoulder Flexion Towel Slide at Table Top - 1 x daily - 7 x weekly - 3 sets - 15 reps - 5 hold      Access Code: [email or web address]: [email or web address]/Date: 12/28/2021Prepared by: Allison Efraín KimExercises   Isometric Shoulder Abduction - 2 x daily - 7 x weekly - 2 sets - 10 reps - 5 hold   Isometric Shoulder Flexion - 2 x daily - 7 x weekly - 2 sets - 10 reps - 5 hold   Isometric Shoulder Internal Rotation - 2 x daily - 7 x weekly - 2 sets - 10 reps - 5 hold   Isometric Shoulder External Rotation - 2 x daily - 7 x weekly - 2 sets - 10 reps - 5 hold    Access Code: [email or web address]: [employer]/Date: 01/13/2022Prepared by: Allison Efraínnorma AlvarezExercises   Shoulder Scaption AAROM with Dowel - 1 x daily - 7 x weekly - 2-3 sets - 10 reps - 3 hold   Seated Shoulder Flexion AAROM with Dowel - 1 x daily - 7 x weekly - 2-3 sets - 10 reps - 3 hold    The patient demonstrated good tolerance to and understanding of the HEP. Written instructions have been issued. Therapeutic Exercise and NMR EXR  [] (72579) Provided verbal/tactile cueing for activities related to strengthening, flexibility, endurance, ROM  for improvements in scapular, scapulothoracic and UE control with self care, reaching, carrying, lifting, house/yardwork, driving/computer work.    [] (49681) Provided verbal/tactile cueing for activities related to improving balance, coordination, kinesthetic sense, posture, motor skill, proprioception  to assist with  scapular, scapulothoracic and UE control with self care, reaching, carrying, lifting, house/yardwork, driving/computer work. Therapeutic Activities:    [] (97472 or 01733) Provided verbal/tactile cueing for activities related to improving balance, coordination, kinesthetic sense, posture, motor skill, proprioception and motor activation to allow for proper function of scapular, scapulothoracic and UE control with self care, carrying, lifting, driving/computer work.      Home Exercise Program:    [x] (64831) Reviewed/Progressed HEP activities related to strengthening, flexibility, endurance, ROM of scapular, scapulothoracic and UE control with self care, reaching, carrying, lifting, house/yardwork, driving/computer work  [] (36988) Reviewed/Progressed HEP activities related to improving balance, coordination, kinesthetic sense, posture, motor skill, proprioception of scapular, scapulothoracic and UE control with self care, reaching, carrying, lifting, house/yardwork, driving/computer work      Manual Treatments:  PROM / STM / Oscillations-Mobs:  G-I, II, III, IV (PA's, Inf., Post.)  [] ([id number]) Provided manual therapy to mobilize soft tissue/joints of cervical/CT, scapular GHJ and UE for the purpose of modulating pain, promoting relaxation,  increasing ROM, reducing/eliminating soft tissue swelling/inflammation/restriction, improving soft tissue extensibility and allowing for proper ROM for normal function with self care, reaching, carrying, lifting, house/yardwork, driving/computer work    Charges:  Timed Code Treatment Minutes: 50   Total Treatment Minutes: 50       [] EVAL (LOW) 95621 (typically 20 minutes face-to-face)  [] EVAL (MOD) 55365 (typically 30 minutes face-to-face)  [] EVAL (HIGH) 97845 (typically 45 minutes face-to-face)  [] RE-EVAL     [x] NK(67926) x     [] Dry needle 1 or 2 Muscles (18059)  [x] NMR (62004) x     [] Dry needle 3+ Muscles (00546)  [x] Manual (17101) x     [] Ultrasound (78591) x  [] TA (69354) x     [] Mech Traction (78550)  [] ES(attended) (68013)     [] ES (un) (04763):   [] Vasopump (49497) [] Ionto (70499)   [] Other:    If [facility] Please Indicate Time In/Out  CPT Code Time in Time out                                   Approval Dates:  CPT Code Units Approved Units Used  Date Updated:                     Elizabeth Almeida stated goal: recover rom, strength and function   []? Progressing: []? Met: []? Not Met: []? Adjusted     Therapist goals for Patient:   Short Term Goals: To be achieved in: 2 weeks  1. Independent in HEP and progression per patient tolerance, in order to prevent re-injury. []? Progressing: []? Met: []? Not Met: []?  Adjusted        Long Term Goals: To be achieved in: 8-12 weeks  1. Disability index score of 30% or less for the Quick DASH to assist with reaching prior level of function. []? Progressing: []? Met: []? Not Met: []? Adjusted  2. Patient will demonstrate increased AROM to flexion 0-120, abduction 0-110, er 0-60 or better  to allow for proper joint functioning as indicated by Functional Deficits. []? Progressing: []? Met: []? Not Met: []? Adjusted  3. Patient will demonstrate an increase in NM recruitment/activation and overall GH and scapular strength to within 10 lbs HHD or WNL for proper functional mobility as indicated by patients Functional Deficits. []? Progressing: []? Met: []? Not Met: []? Adjusted  4. Patient will return to personal care, weight bearing and houshold  activities without increased symptoms or restriction. []? Progressing: []? Met: []? Not Met: []? Adjusted  5. Patient will have a decrease in pain 0-2/10 to facilitate improvement in movement, function, and ADLs as indicated by Functional Deficits. []? Progressing: []? Met: []? Not Met: []? Adjusted    ASSESSMENT:  See eval    Treatment/Activity Tolerance:  [x] Patient tolerated treatment well [] Patient limited by fatique  [] Patient limited by pain  [] Patient limited by other medical complications  [] Other:     Overall Progression Towards Functional goals/ Treatment Progress Update:  [] Patient is progressing as expected towards functional goals listed. [x] Progression is slowed due to complexities/Impairments listed. [] Progression has been slowed due to co-morbidities.   [] Plan just implemented, too soon to assess goals progression <30days   [] Goals require adjustment due to lack of progress  [] Patient is not progressing as expected and requires additional follow up with physician  [] Other    Prognosis for POC: [x] Good [] Fair  [] Poor    Patient requires continued skilled intervention: [x] Yes  [] No      PLAN:              s ADD T band tameka add. And  ext (to neutral) to HEP                           Cohere due for 2/2/22     Increase thera ex, HEP, decrease man Rx   Follow [facility]  rTSA  protocol   [] Continue per plan of care [] Alter current plan (see comments)  [x] Plan of care initiated [] Hold pending MD visit [] Discharge    Electronically signed by: Joshua Campo PT     Note: If patient does not return for scheduled/recommended follow up visits, this note will serve as a discharge from care along with the most recent update on progress.

## 2022-01-27 ENCOUNTER — HOSPITAL ENCOUNTER (OUTPATIENT)
Dept: PHYSICAL THERAPY | Age: 72
Setting detail: THERAPIES SERIES
Discharge: HOME | End: 2022-01-27
Payer: MEDICARE

## 2022-01-27 PROCEDURE — 97140 MANUAL THERAPY 1/> REGIONS: CPT

## 2022-01-27 PROCEDURE — 97112 NEUROMUSCULAR REEDUCATION: CPT

## 2022-01-27 PROCEDURE — 97110 THERAPEUTIC EXERCISES: CPT

## 2022-01-27 NOTE — FLOWSHEET NOTE
[address] 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    Insurance/Certification information:  PT Insurance Information: [facility]  Physician Information:  Referring Practitioner: Dr Sofya Eastman of care signed (Y/N): routed 21    Date of Patient follow up with Physician:      Progress Report: []  Yes  [x]  No     Date Range for reporting period: Eval= 37,  22 = 22  Beginnin2022  Ending:      Progress report due (10 Rx/or 30 days whichever is less): 3/51/86    Recertification due (POC duration/ or 90 days whichever is less):     Visit # POC/Insurance Allowable Auth Needed    24 visits thru  22 [facility]here [x]Yes    []No     Functional Outcomes Measure:   Date Assessed:  Test:  Score:     Pain level:  0/10     History of Injury: Patient stated complaint: H/O Left shoulder pain for several years  with resultant  L rTSA DOS 21, he retired and lives with wife in house, he is left hand dominant    SUBJECTIVE:  21: Patient reports shoulder is doing well,just stiffness. States he still sleeping in a recliner at home. 21: Patient reports shoulder is doing ok,minimal soreness. States he still sleeping in a recliner. 21 Pt reports most of the time having no shoulder pain but was sore this morning following a busy day yesterday. 21 Pt reports no pain today, he has been attempting to transition back to bed but recliner still feels more comfortable. 12/28/21 Pt reports no pain or problems with how his shoulder feels today. 12/30/21 Pt reports \"every day I can feel a little more range of motion\". 01/06/22 Patient reports shoulder is improving,using his left arm more for driving. States he still sleeping in a recliner. 1/11/22 Patient reports no shoulder pain today. 1/13/22 Pt reports no shoulder or arm pain this am.   01/18/22 Patient reports shoulder was a little sore after he moved some light furniture in his [address] 01/20/22 Patient reports shoulder has been doing well. 1/25/22 Pt reports no pain today. He would like to continue PT to ensure ability to reach behind his back with his left arm.   1/27/22 Pt reports no pain at his shoulder. OBJECTIVE:    Observation:    Test measurements:    ROM:Left shoulder   Date      Shldr flexion    Shldr abd  Shldr IR         Shldr ER   A P A P A P A P   Eval           12/16/21 0-92 142 0-78 0-121 0-50 in scapular plane Not assessed  0-55 in scapular plane  65   12/23/21  145  125    65   1/11/22  150  140  55  65   1/25/22  142 158 121 145  55 56 65                           Strength:  Date Shoulder flexion Shoulder abduction Shoulder IR Shoulder  ER Bicep   Eval        1/27/22 4/5 4/5 4+/5 4/5 4+/5                       RESTRICTIONS/PRECAUTIONS:     Exercises/Interventions:   Therapeutic Ex (53097)  Min: 15 Resistance/Reps Cues/Notes   OH pulley  3 min     gripper     Supine cane press  amparo well   Deltoid isometrics    SL'ing [place]     T slide rows  Rows   Black 20 x   Elevated Black 30 x   ADD Green 30 x      Cues to avoid pain         Cane assist  Flex. , scaption  10 x 2 ea  AddED TO HEP             Mat ex          Supine scap     Incline table slide. Manual Intervention  (78471)  Min: 20     PROM left shoulder Left shoulder flex, scaption and ER    DTM/ MFR Left pectoralis mm, Lev scap., scalenes  and UT's     Stretch   To above muscles     Scap.  Mobs  Resume>> I    Scar tissue mobs  Left shoulder          NMR re-education (28707)  Min:20        Scapular retraction/ stability    Scapular retraction vs therapy ball      Rowe Standing  Flexion and Sscaption 3\" hold 10 x 2 ea  Scapular retraction/ stability/ eccentric control    Wax on / off 20 x ea  Scapular retraction/ stability and control    Supine scap protraction  4# 20 x  Scapular stability/ eccentric control    Counter top partial push ups 3\" 10 x 2 Scapular stability/ eccentric control         Therapeutic Activity (93227)  Min:               Modalities:  Min     CP            Other Therapeutic Activities:  Pt was educated on PT POC, Diagnosis, Prognosis, pathomechanics as well as frequency and duration of scheduling future physical therapy appointments. Time was also taken on this day to answer all patient questions and participation in PT. Reviewed appointment policy in detail with patient and patient verbalized understanding. Home Exercise Program:   HEP instruction: Access Code: [email or web address]: [employer]/Date: 11/30/2021Prepared by: Fiona Meeks   · Seated Biceps Curl - 2 x daily - 7 x weekly - 3 sets - 10 reps   · Seated Forearm Pronation and Supination AROM - 2 x daily - 7 x weekly - 3 sets - 10 reps - 3 hold   · Seated Scapular Retraction - 2 x daily - 7 x weekly - 1 sets - 20 reps - 5 hold   · Circular Shoulder Pendulum with Table Support - 2 x daily - 7 x weekly - 1 sets - 20 reps   · Flexion-Extension Shoulder Pendulum with Table Support - 2 x daily - 7 x weekly - 1 sets - 20 reps   · Horizontal Shoulder Pendulum with Table Support - 2 x daily - 7 x weekly - 1 sets - 20 reps  The patient demonstrated good tolerance to and understanding of the HEP. Written instructions have been issued. 12/08/21 reviewed above exercises  Access Code: [email or web address]  URL: [email or web address]  Date: 12/08/2021  Prepared by: Patricia Reveles    Exercises  Seated Shoulder Flexion Towel Slide at Table Top - 1 x daily - 7 x weekly - 3 sets - 15 reps - 5 hold      Access Code: [email or web address]: [email or web address] com/Date: 12/28/2021Prepared by: June [employer]   Isometric Shoulder Abduction - 2 x daily - 7 x weekly - 2 sets - 10 reps - 5 hold   Isometric Shoulder Flexion - 2 x daily - 7 x weekly - 2 sets - 10 reps - 5 hold   Isometric Shoulder Internal Rotation - 2 x daily - 7 x weekly - 2 sets - 10 reps - 5 hold   Isometric Shoulder External Rotation - 2 x daily - 7 x weekly - 2 sets - 10 reps - 5 hold    Access Code: [email or web address]: "[email or web address] com/Date: 01/13/2022Prepared by: June [employer] Bloom[employer]   Shoulder Scaption AAROM with Dowel - 1 x daily - 7 x weekly - 2-3 sets - 10 reps - 3 hold   Seated Shoulder Flexion AAROM with Dowel - 1 x daily - 7 x weekly - 2-3 sets - 10 reps - 3 hold    The patient demonstrated good tolerance to and understanding of the HEP. Written instructions have been issued. Therapeutic Exercise and NMR EXR  [] (33112) Provided verbal/tactile cueing for activities related to strengthening, flexibility, endurance, ROM  for improvements in scapular, scapulothoracic and UE control with self care, reaching, carrying, lifting, house/yardwork, driving/computer work.    [] (20661) Provided verbal/tactile cueing for activities related to improving balance, coordination, kinesthetic sense, posture, motor skill, proprioception  to assist with  scapular, scapulothoracic and UE control with self care, reaching, carrying, lifting, house/yardwork, driving/computer work. Therapeutic Activities:    [] (83638 or 22962) Provided verbal/tactile cueing for activities related to improving balance, coordination, kinesthetic sense, posture, motor skill, proprioception and motor activation to allow for proper function of scapular, scapulothoracic and UE control with self care, carrying, lifting, driving/computer work.      Home Exercise Program:    [x] (66372) Reviewed/Progressed HEP activities related to strengthening, flexibility, endurance, ROM of scapular, scapulothoracic and UE control with self care, reaching, carrying, lifting, house/yardwork, driving/computer work  [] (17118) Reviewed/Progressed HEP activities related to improving balance, coordination, kinesthetic sense, posture, motor skill, proprioception of scapular, scapulothoracic and UE control with self care, reaching, carrying, lifting, house/yardwork, driving/computer work      Manual Treatments:  PROM / STM / Oscillations-Mobs:  G-I, II, III, IV (PA's, Inf., Post.)  [] ([id number]) Provided manual therapy to mobilize soft tissue/joints of cervical/CT, scapular GHJ and UE for the purpose of modulating pain, promoting relaxation,  increasing ROM, reducing/eliminating soft tissue swelling/inflammation/restriction, improving soft tissue extensibility and allowing for proper ROM for normal function with self care, reaching, carrying, lifting, house/yardwork, driving/computer work    Charges:  Timed Code Treatment Minutes: 50   Total Treatment Minutes: 50       [] EVAL (LOW) 20956 (typically 20 minutes face-to-face)  [] EVAL (MOD) 77833 (typically 30 minutes face-to-face)  [] EVAL (HIGH) 80451 (typically 45 minutes face-to-face)  [] RE-EVAL     [x] JR(16897) x     [] Dry needle 1 or 2 Muscles (70101)  [x] NMR (51854) x     [] Dry needle 3+ Muscles (45668)  [x] Manual (93080) x     [] Ultrasound (92089) x  [] TA (91257) x     [] [facility] Traction (82356)  [] ES(attended) (38879)     [] ES (un) (47852):   [] Vasopump (03304) [] Ionto (28674)   [] Other:    If [facility] Please Indicate Time In/Out  CPT Code Time in Time out                                   Approval Dates:  CPT Code Units Approved Units Used  Date Updated:                     Yuni Beckham stated goal: recover rom, strength and function   []? Progressing: []? Met: []? Not Met: []? Adjusted     Therapist goals for Patient:   Short Term Goals: To be achieved in: 2 weeks  1.  Independent in HEP and progression per patient tolerance, in order to prevent re-injury. []? Progressing: []? Met: []? Not Met: []? Adjusted        Long Term Goals: To be achieved in: 8-12 weeks  1. Disability index score of 30% or less for the Quick DASH to assist with reaching prior level of function. []? Progressing: []? Met: []? Not Met: []? Adjusted  2. Patient will demonstrate increased AROM to flexion 0-120, abduction 0-110, er 0-60 or better  to allow for proper joint functioning as indicated by Functional Deficits. []? Progressing: []? Met: []? Not Met: []? Adjusted  3. Patient will demonstrate an increase in NM recruitment/activation and overall GH and scapular strength to within 10 lbs HHD or WNL for proper functional mobility as indicated by patients Functional Deficits. []? Progressing: []? Met: []? Not Met: []? Adjusted  4. Patient will return to personal care, weight bearing and houshold  activities without increased symptoms or restriction. []? Progressing: []? Met: []? Not Met: []? Adjusted  5. Patient will have a decrease in pain 0-2/10 to facilitate improvement in movement, function, and ADLs as indicated by Functional Deficits. []? Progressing: []? Met: []? Not Met: []? Adjusted    ASSESSMENT:  See eval    Treatment/Activity Tolerance:  [x] Patient tolerated treatment well [] Patient limited by fatique  [] Patient limited by pain  [] Patient limited by other medical complications  [] Other:     Overall Progression Towards Functional goals/ Treatment Progress Update:  [] Patient is progressing as expected towards functional goals listed. [x] Progression is slowed due to complexities/Impairments listed. [] Progression has been slowed due to co-morbidities.   [] Plan just implemented, too soon to assess goals progression <30days   [] Goals require adjustment due to lack of progress  [] Patient is not progressing as expected and requires additional follow up with physician  [] Other    Prognosis for POC: [x] Good [] Fair  [] Poor    Patient requires continued skilled intervention: [x] Yes  [] No      PLAN:              s ADD T band row, add. And  ext (to neutral) to HEP                           Cohere due for 2/2/22     Increase thera ex, HEP, decrease man Rx   [facility]SA  protocol   [] Continue per plan of care [] Alter current plan (see comments)  [x] Plan of care initiated [] Hold pending MD visit [] Discharge    Electronically signed by: Pedro Pablo Barajas PT     Note: If patient does not return for scheduled/recommended follow up visits, this note will serve as a discharge from care along with the most recent update on progress.

## 2022-02-01 ENCOUNTER — HOSPITAL ENCOUNTER (OUTPATIENT)
Dept: PHYSICAL THERAPY | Age: 72
Setting detail: THERAPIES SERIES
Discharge: HOME | End: 2022-02-01
Payer: MEDICARE

## 2022-02-01 PROCEDURE — 97110 THERAPEUTIC EXERCISES: CPT

## 2022-02-01 PROCEDURE — 97112 NEUROMUSCULAR REEDUCATION: CPT

## 2022-02-01 PROCEDURE — 97140 MANUAL THERAPY 1/> REGIONS: CPT

## 2022-02-01 NOTE — FLOWSHEET NOTE
[address] [facility]  Phone: [phone]   Fax:     [phone]        Physical Therapy Treatment Note/ Progress Report:       Date:  2022    Patient Name:  Nile Ramos    :  1950  MRN: [id number]    Pertinent Medical History:     Medical/Treatment Diagnosis Information:  Diagnosis: History of total replacement of left shoulder joint (I44.933  Treatment Diagnosis: Left shoulder movement dysfuction s/p Left rTSA    Insurance/Certification information:  PT Insurance Information: [facility] [employer] Medicare  Physician Information:  Referring Practitioner: Dr Jose Enrique Joyce of care signed (Y/N): routed 21    Date of Patient follow up with Physician:      Progress Report: []  Yes  [x]  No     Date Range for reporting period:Quickda Eval= 37,  22 = 22  Beginnin2022  Ending:      Progress report due (10 Rx/or 30 days whichever is less): 05    Recertification due (POC duration/ or 90 days whichever is less):     Visit # POC/Insurance Allowable Auth Needed   15/24 24 visits thru  22 Cohere [x]Yes    []No     Functional Outcomes Measure:   Date Assessed:  Test:  Score:     Pain level:  0/10     History of Injury: Patient stated complaint: H/O Left shoulder pain for several years  with resultant  L rTSA DOS 21, he retired and lives with wife in house, he is left hand dominant    SUBJECTIVE:  21: Patient reports shoulder is doing well,just stiffness. States he still sleeping in a recliner at home. 21: Patient reports shoulder is doing ok,minimal soreness. States he still sleeping in a recliner. 21 Pt reports most of the time having no shoulder pain but was sore this morning following a busy day yesterday. 21 Pt reports no pain today, he has been attempting to transition back to bed but recliner still feels more comfortable. 12/28/21 Pt reports no pain or problems with how his shoulder feels today. 12/30/21 Pt reports \"every day I can feel a little more range of motion\". 01/06/22 Patient reports shoulder is improving,using his left arm more for driving. States he still sleeping in a recliner. 1/11/22 Patient reports no shoulder pain today. 1/13/22 Pt reports no shoulder or arm pain this am.   01/18/22 Patient reports shoulder was a little sore after he moved some light furniture in his [address] 01/20/22 Patient reports shoulder has been doing well. 1/25/22 Pt reports no pain today. He would like to continue PT to ensure ability to reach behind his back with his left arm.   1/27/22 Pt reports no pain at his shoulder. 02/01/22 Patient reports shoulder cont. to improve,minimal soreness. OBJECTIVE:    Observation:    Test measurements:    ROM:Left shoulder   Date      Shldr flexion    Shldr abd  Shldr IR         Shldr ER   A P A P A P A P   Eval           12/16/21 0-92 142 0-78 0-121 0-50 in scapular plane Not assessed  0-55 in scapular plane  65   12/23/21  145  125    65   1/11/22  150  140  55  65   1/25/22  142 158 121 145  55 56 65                           Strength:  Date Shoulder flexion Shoulder abduction Shoulder IR Shoulder  ER Bicep   Eval        1/27/22 4/5 4/5 4+/5 4/5 4+/5                       RESTRICTIONS/PRECAUTIONS:     Exercises/Interventions:   Therapeutic Ex (72566)  Min: 15 Resistance/Reps Cues/Notes   OH pulley  3 min     gripper     Supine cane press  amparo well   Deltoid isometrics    SL'ing [place]     T slide rows  Rows   Black 20 x   Elevated Black 30 x   ADD Green 30 x      Cues to avoid pain         Cane assist  Flex. , scaption  10 x 2 ea  AddED TO HEP             Mat ex          Supine scap     Incline table slide.                     Manual Intervention  (49363)  Min: 20     PROM left shoulder Left shoulder flex, scaption and ER    DTM/ MFR Left pectoralis mm, Lev scap., scalenes  and UT's Stretch   To above muscles     Scap. Mobs  Resume>> I    Scar tissue mobs  Left shoulder          NMR re-education (65135)  Min:20        Scapular retraction/ stability    Scapular retraction vs therapy ball      Sebring Standing  Flexion and Sscaption 3\" hold 10 x 2 ea  Scapular retraction/ stability/ eccentric control    Wax on / off 20 x ea  Scapular retraction/ stability and control    Supine scap protraction  4# 20 x  Scapular stability/ eccentric control    Counter top partial push ups 3\" 10 x 2 Scapular stability/ eccentric control         Therapeutic Activity (73203)  Min:               Modalities:  Min     CP            Other Therapeutic Activities:  Pt was educated on PT POC, Diagnosis, Prognosis, pathomechanics as well as frequency and duration of scheduling future physical therapy appointments. Time was also taken on this day to answer all patient questions and participation in PT. Reviewed appointment policy in detail with patient and patient verbalized understanding. Home Exercise Program:   HEP instruction: Access Code: [email or web address]: MyTr[employer]/Date: 11/30/2021Prepared by: Gideon Dus   · Seated Biceps Curl - 2 x daily - 7 x weekly - 3 sets - 10 reps   · Seated Forearm Pronation and Supination AROM - 2 x daily - 7 x weekly - 3 sets - 10 reps - 3 hold   · Seated Scapular Retraction - 2 x daily - 7 x weekly - 1 sets - 20 reps - 5 hold   · Circular Shoulder Pendulum with Table Support - 2 x daily - 7 x weekly - 1 sets - 20 reps   · Flexion-Extension Shoulder Pendulum with Table Support - 2 x daily - 7 x weekly - 1 sets - 20 reps   · Horizontal Shoulder Pendulum with Table Support - 2 x daily - 7 x weekly - 1 sets - 20 reps  The patient demonstrated good tolerance to and understanding of the HEP. Written instructions have been issued. 12/08/21 reviewed above exercises  Access Code: [email or web address]  URL: MyTrade/  Date: 12/08/2021  Prepared by: Jimmy Goldberg Stacocouskas    Exercises  Seated Shoulder Flexion Towel Slide at Table Top - 1 x daily - 7 x weekly - 3 sets - 15 reps - 5 hold      Access Code: [email or web address]: [email or web address]. com/Date: 12/28/2021Prepared by: Dianne Villatoro KimExercises   Isometric Shoulder Abduction - 2 x daily - 7 x weekly - 2 sets - 10 reps - 5 hold   Isometric Shoulder Flexion - 2 x daily - 7 x weekly - 2 sets - 10 reps - 5 hold   Isometric Shoulder Internal Rotation - 2 x daily - 7 x weekly - 2 sets - 10 reps - 5 hold   Isometric Shoulder External Rotation - 2 x daily - 7 x weekly - 2 sets - 10 reps - 5 hold    Access Code: [email or web address]. com/Date: 01/13/2022Prepared by: Dianne Villatoro BloomExercises   Shoulder Scaption AAROM with Dowel - 1 x daily - 7 x weekly - 2-3 sets - 10 reps - 3 hold   Seated Shoulder Flexion AAROM with Dowel - 1 x daily - 7 x weekly - 2-3 sets - 10 reps - 3 hold    The patient demonstrated good tolerance to and understanding of the HEP. Written instructions have been issued. Therapeutic Exercise and NMR EXR  [] (20499) Provided verbal/tactile cueing for activities related to strengthening, flexibility, endurance, ROM  for improvements in scapular, scapulothoracic and UE control with self care, reaching, carrying, lifting, house/yardwork, driving/computer work.    [] (69121) Provided verbal/tactile cueing for activities related to improving balance, coordination, kinesthetic sense, posture, motor skill, proprioception  to assist with  scapular, scapulothoracic and UE control with self care, reaching, carrying, lifting, house/yardwork, driving/computer work. Therapeutic Activities:    [] (97693 or 32337) Provided verbal/tactile cueing for activities related to improving balance, coordination, kinesthetic sense, posture, motor skill, proprioception and motor activation to allow for proper function of scapular, scapulothoracic and UE control with self care, carrying, lifting, driving/computer work. Home Exercise Program:    [x] (06843) Reviewed/Progressed HEP activities related to strengthening, flexibility, endurance, ROM of scapular, scapulothoracic and UE control with self care, reaching, carrying, lifting, house/yardwork, driving/computer work  [] (74582) Reviewed/Progressed HEP activities related to improving balance, coordination, kinesthetic sense, posture, motor skill, proprioception of scapular, scapulothoracic and UE control with self care, reaching, carrying, lifting, house/yardwork, driving/computer work      Manual Treatments:  PROM / STM / Oscillations-Mobs:  G-I, II, III, IV (PA's, Inf., Post.)  [] (20018) Provided manual therapy to mobilize soft tissue/joints of cervical/CT, scapular GHJ and UE for the purpose of modulating pain, promoting relaxation,  increasing ROM, reducing/eliminating soft tissue swelling/inflammation/restriction, improving soft tissue extensibility and allowing for proper ROM for normal function with self care, reaching, carrying, lifting, house/yardwork, driving/computer work    Charges:  Timed Code Treatment Minutes: 50   Total Treatment Minutes: 50       [] EVAL (LOW) 64545 (typically 20 minutes face-to-face)  [] EVAL (MOD) 90855 (typically 30 minutes face-to-face)  [] EVAL (HIGH) 59508 (typically 45 minutes face-to-face)  [] RE-EVAL     [x] EG(40583) x     [] Dry needle 1 or 2 Muscles (95709)  [x] NMR (25929) x     [] Dry needle 3+ Muscles (80435)  [x] Manual (76444) x     [] Ultrasound (77587) x  [] TA (56143) x     [] Mech Traction (90173)  [] ES(attended) (27628)     [] ES (un) (01343):   [] Vasopump (43741) [] Ionto (83916)   [] Other:    If [facility] Please Indicate Time In/Out  CPT Code Time in Time out                                   Approval Dates:  CPT Code Units Approved Units Used  Date Updated:                     Soheila Caballero stated goal: recover rom, strength and function   []? Progressing: []? Met: []? Not Met: []?  Adjusted     Therapist goals for Patient:   Short Term Goals: To be achieved in: 2 weeks  1. Independent in HEP and progression per patient tolerance, in order to prevent re-injury. []? Progressing: []? Met: []? Not Met: []? Adjusted        Long Term Goals: To be achieved in: 8-12 weeks  1. Disability index score of 30% or less for the Quick DASH to assist with reaching prior level of function. []? Progressing: []? Met: []? Not Met: []? Adjusted  2. Patient will demonstrate increased AROM to flexion 0-120, abduction 0-110, er 0-60 or better  to allow for proper joint functioning as indicated by Functional Deficits. []? Progressing: []? Met: []? Not Met: []? Adjusted  3. Patient will demonstrate an increase in NM recruitment/activation and overall GH and scapular strength to within 10 lbs HHD or WNL for proper functional mobility as indicated by patients Functional Deficits. []? Progressing: []? Met: []? Not Met: []? Adjusted  4. Patient will return to personal care, weight bearing and houshold  activities without increased symptoms or restriction. []? Progressing: []? Met: []? Not Met: []? Adjusted  5. Patient will have a decrease in pain 0-2/10 to facilitate improvement in movement, function, and ADLs as indicated by Functional Deficits. []? Progressing: []? Met: []? Not Met: []? Adjusted    ASSESSMENT:  See eval    Treatment/Activity Tolerance:  [x] Patient tolerated treatment well [] Patient limited by fatique  [] Patient limited by pain  [] Patient limited by other medical complications  [] Other:     Overall Progression Towards Functional goals/ Treatment Progress Update:  [] Patient is progressing as expected towards functional goals listed. [x] Progression is slowed due to complexities/Impairments listed. [] Progression has been slowed due to co-morbidities.   [] Plan just implemented, too soon to assess goals progression <30days   [] Goals require adjustment due to lack of progress  [] Patient is not progressing as expected and requires additional follow up with physician  [] Other    Prognosis for POC: [x] Good [] Fair  [] Poor    Patient requires continued skilled intervention: [x] Yes  [] No      PLAN:              s ADD T band row, add. And  ext (to neutral) to HEP                           Cohere due for 2/2/22     Increase thera ex, HEP, decrease man Rx   Follow [facility]  rTSA  protocol   [] Continue per plan of care [] Alter current plan (see comments)  [x] Plan of care initiated [] Hold pending MD visit [] Discharge    Electronically signed by: Felicia Mays PTA     Note: If patient does not return for scheduled/recommended follow up visits, this note will serve as a discharge from care along with the most recent update on progress.

## 2022-02-03 ENCOUNTER — HOSPITAL ENCOUNTER (OUTPATIENT)
Dept: PHYSICAL THERAPY | Age: 72
Setting detail: THERAPIES SERIES
End: 2022-02-03
Payer: MEDICARE

## 2022-02-07 ENCOUNTER — OFFICE VISIT (OUTPATIENT)
Dept: ORTHOPEDIC SURGERY | Age: 72
End: 2022-02-07
Payer: MEDICARE

## 2022-02-07 VITALS — HEIGHT: 74 IN | BODY MASS INDEX: 33.88 KG/M2 | WEIGHT: 264 LBS | RESPIRATION RATE: 16 BRPM

## 2022-02-07 DIAGNOSIS — Z96.612 STATUS POST REVERSE TOTAL ARTHROPLASTY OF LEFT SHOULDER: Primary | ICD-10-CM

## 2022-02-07 DIAGNOSIS — M17.0 PRIMARY OSTEOARTHRITIS OF BOTH KNEES: ICD-10-CM

## 2022-02-07 PROCEDURE — G8417 CALC BMI ABV UP PARAM F/U: HCPCS | Performed by: PHYSICIAN ASSISTANT

## 2022-02-07 PROCEDURE — G8484 FLU IMMUNIZE NO ADMIN: HCPCS | Performed by: PHYSICIAN ASSISTANT

## 2022-02-07 PROCEDURE — 4040F PNEUMOC VAC/ADMIN/RCVD: CPT | Performed by: PHYSICIAN ASSISTANT

## 2022-02-07 PROCEDURE — 99213 OFFICE O/P EST LOW 20 MIN: CPT | Performed by: PHYSICIAN ASSISTANT

## 2022-02-07 PROCEDURE — 1036F TOBACCO NON-USER: CPT | Performed by: PHYSICIAN ASSISTANT

## 2022-02-07 PROCEDURE — 1123F ACP DISCUSS/DSCN MKR DOCD: CPT | Performed by: PHYSICIAN ASSISTANT

## 2022-02-07 PROCEDURE — G8427 DOCREV CUR MEDS BY ELIG CLIN: HCPCS | Performed by: PHYSICIAN ASSISTANT

## 2022-02-07 PROCEDURE — 20610 DRAIN/INJ JOINT/BURSA W/O US: CPT | Performed by: PHYSICIAN ASSISTANT

## 2022-02-07 PROCEDURE — 3017F COLORECTAL CA SCREEN DOC REV: CPT | Performed by: PHYSICIAN ASSISTANT

## 2022-02-07 RX ORDER — BUPIVACAINE HYDROCHLORIDE 2.5 MG/ML
2 INJECTION, SOLUTION INFILTRATION; PERINEURAL ONCE
Status: COMPLETED | OUTPATIENT
Start: 2022-02-07 | End: 2022-02-07

## 2022-02-07 RX ORDER — TRIAMCINOLONE ACETONIDE 40 MG/ML
40 INJECTION, SUSPENSION INTRA-ARTICULAR; INTRAMUSCULAR ONCE
Status: COMPLETED | OUTPATIENT
Start: 2022-02-07 | End: 2022-02-07

## 2022-02-07 RX ADMIN — TRIAMCINOLONE ACETONIDE 40 MG: 40 INJECTION, SUSPENSION INTRA-ARTICULAR; INTRAMUSCULAR at 09:30

## 2022-02-07 RX ADMIN — TRIAMCINOLONE ACETONIDE 40 MG: 40 INJECTION, SUSPENSION INTRA-ARTICULAR; INTRAMUSCULAR at 09:29

## 2022-02-07 RX ADMIN — BUPIVACAINE HYDROCHLORIDE 5 MG: 2.5 INJECTION, SOLUTION INFILTRATION; PERINEURAL at 09:29

## 2022-02-07 NOTE — PROGRESS NOTES
This dictation was done with Dragon dictation and may contain mechanical errors related to translation. I have today reviewed with Jennifer Aquino the clinically relevant, past medical history, medications, allergies, family history, social history, and Review Of Systems form the patients most recent history form & I have documented any details relevant to today's presenting complaints in my history below. Mr. Marti Healy self-reported past medical history, medications, allergies, family history, social history, and Review Of Systems form has been scanned into the chart under the \"Media\" tab. Subjective:  Jennifer Aquino is a 66 y.o. who is here in follow-up for his left shoulder as well as the osteoarthritis in both of his knees. He had left reverse total shoulder done on 11/17/2021 and has made good progress. He says he has 0-10 pain but he is little frustrated that he cannot put his arm behind his back as far as he wants to at this point. He is also had a recent cortisone injection back in October that lasted 8 weeks. Over the last several weeks he has had increasing soreness swelling and pain mainly in the medial and patellofemoral area of both knees.       Patient Active Problem List   Diagnosis    Elbow pain    Hyperlipidemia    HTN (hypertension)    Vitamin D deficiency    MTHFR mutation    Insulin resistance    Lumbosacral radiculopathy at L5    Pain of left hip joint    Arthritis of left hip    Spinal stenosis of lumbar region    Postlaminectomy syndrome    Status post lumbar spinal fusion    10/12/17 Removal fixation T11-L3 ; laminectomy L3-4 L4-L5; posterior spinal fusion L2-L3, L3-L4     Arthritis of left shoulder region    Primary osteoarthritis of right knee    Osteoarthritis of left shoulder due to rotator cuff injury           Current Outpatient Medications on File Prior to Visit   Medication Sig Dispense Refill    aspirin 81 MG EC tablet Take 1 tablet by mouth 2 times daily 60 tablet 0    meloxicam (MOBIC) 7.5 MG tablet Take 1 tablet by mouth daily for 5 days 5 tablet 0    pregabalin (LYRICA) 75 MG capsule Take 1 capsule by mouth 2 times daily for 14 days. 28 capsule 0    Cyanocobalamin (VITAMIN B 12 PO) Take 1 tablet by mouth daily      fosinopril (MONOPRIL) 10 MG tablet Take 20 mg by mouth nightly      Cholecalciferol (VITAMIN D3) 50 MCG (2000 UT) CAPS Take by mouth daily      metFORMIN (GLUCOPHAGE) 500 MG tablet Take 1 tablet by mouth 2 times daily (with meals) (Patient taking differently: Take 500 mg by mouth daily (with breakfast) Patient states he takes once a day) 60 tablet 1    CRESTOR 40 MG tablet TAKE ONE TABLET BY MOUTH EVERY EVENING 90 tablet 2    Thiamine HCl (VITAMIN B-1 PO) Take by mouth daily Vitamin B12       No current facility-administered medications on file prior to visit. Objective:   Resp. rate 16, height 6' 2\" (1.88 m), weight 264 lb (119.7 kg). On examination this is a pleasant 60-year-old gentleman in no acute distress he is alert and oriented x3 he has approximately 160 degrees of forward flexion about 110 degrees of abduction. He has  fair shoulder abduction strength and can touch the opposite hip top and shoulder and the top of his head comfortably. No neurological deficits to the left hand. Both knees have 1+ edema with crepitus during flexion extension through the patellofemoral joint with the right being worse than the left. No significant varus or valgus laxity quad tone is acceptable. He is got good distal pulses good dorsiflexion plantarflexion strength and comfortable internal and external rotation of both hips. Neuro exam grossly intact both lower extremities. Intact sensation to light touch. Motor exam 4+ to 5/5 in all major motor groups. Negative Roberts's sign. Skin is warm, dry and intact with out erythema or significant increased temperature around the knee joint(s).      There are no cutaneous lesions or lymphadenopathy present. X-RAYS:  The x-rays taken the office today of the left shoulder showed a stable reverse total shoulder. The AP transaxillary view and Y view show excellent alignment sizing and fit of the reverse total shoulder with no lucencies good screw fixation into the glenoid or any other bony abnormalities. Assessment:  Stable healing left reverse total shoulder and osteoarthritis of both knees    Plan:  During today's visit, there was approximately 20 minutes of face-to-face discussion in regards to the patient's current condition and treatment options. More than 50 % of the time was counseling and coordination of care as indicated above. We talked about short and long-term expectations with the shoulder and her continue with exercises and treatment addition to her home exercise program only. He was given a cortisone shot in both of his knees and I think he be an excellent candidate for hyaluronic acid injections  This patient has a well documented history of osteoarthritis in their knee. They have trialed Nsaid medications, physical therapy exercises and steroid injections. They continue to have pain, swelling and dysfunction. At this junction, hyalgen injections are advisable. I gave them literature and discussed the risks and benefits with them and would like to seek pre-authorization for        PROCEDURE NOTE:  After a discussion of the multiple options, they consented to a cortisone shot. 1 ml of 40mg/ml Kenalog and 2 ml's of 0.25%Marcaine were injected into both the right and the left knee joint spaces. The leg was slightly flexed and injected just lateral to the patella tendon to under the patella. This was done with sterile technique and they tolerated it well.         They will schedule a follow up in 4 weeks

## 2022-02-08 ENCOUNTER — HOSPITAL ENCOUNTER (OUTPATIENT)
Dept: PHYSICAL THERAPY | Age: 72
Setting detail: THERAPIES SERIES
Discharge: HOME | End: 2022-02-08
Payer: MEDICARE

## 2022-02-08 NOTE — FLOWSHEET NOTE
[address] [facility]  [place] [address]  Phone: [phone]   Fax:     [phone]    Physical Therapy  Cancellation/No-show Note  Patient Name:  Meghan Sanchez  :  1950   Date:  2022  Cancelled visits to date: 1  No-shows to date: 0    Patient status for today's appointment patient:  [x]  Cancelled  []  Rescheduled appointment  []  No-show     Reason given by patient:  []  Patient ill  []  Conflicting appointment  []  No transportation    []  Conflict with work  []  No reason given  [x]  Other:     Comments: per patient Doctor advised to DC PT     Phone call information:   []  Phone call made today to patient at _ time at number provided:      []  Patient answered, conversation as follows:    []  Patient did not answer, message left as follows:  []  Phone call not made today    Electronically signed by:  Jennifer Puri PT

## 2022-02-10 ENCOUNTER — APPOINTMENT (OUTPATIENT)
Dept: PHYSICAL THERAPY | Age: 72
End: 2022-02-10
Payer: MEDICARE

## 2022-02-15 ENCOUNTER — APPOINTMENT (OUTPATIENT)
Dept: PHYSICAL THERAPY | Age: 72
End: 2022-02-15
Payer: MEDICARE

## 2022-02-17 ENCOUNTER — APPOINTMENT (OUTPATIENT)
Dept: PHYSICAL THERAPY | Age: 72
End: 2022-02-17
Payer: MEDICARE

## 2022-02-22 ENCOUNTER — APPOINTMENT (OUTPATIENT)
Dept: PHYSICAL THERAPY | Age: 72
End: 2022-02-22
Payer: MEDICARE

## 2022-02-23 ENCOUNTER — TELEPHONE (OUTPATIENT)
Dept: ORTHOPEDIC SURGERY | Age: 72
End: 2022-02-23

## 2022-02-23 NOTE — TELEPHONE ENCOUNTER
Julio Cesar ARAUJO [facility] Clinical Support  2/22/2022. [address] [facility] () series of 3. APPROVED # [id number].  2/17/2022-5/17/2022.  BILATERAL KNEE.  VALID & BILLABLE ON CLAIM YES. BUY AND BILL.  Per FAX from [employer]. I called and informed the patient that [facility] bilateral knees is approved. He is going to think about it and let us know if he wants to proceed.

## 2022-02-24 ENCOUNTER — APPOINTMENT (OUTPATIENT)
Dept: PHYSICAL THERAPY | Age: 72
End: 2022-02-24
Payer: MEDICARE

## 2022-03-30 ENCOUNTER — OFFICE VISIT (OUTPATIENT)
Dept: ORTHOPEDIC SURGERY | Age: 72
End: 2022-03-30
Payer: MEDICARE

## 2022-03-30 VITALS — RESPIRATION RATE: 18 BRPM | HEIGHT: 76 IN | BODY MASS INDEX: 32.15 KG/M2 | WEIGHT: 264 LBS

## 2022-03-30 DIAGNOSIS — M17.0 PRIMARY OSTEOARTHRITIS OF BOTH KNEES: Primary | ICD-10-CM

## 2022-03-30 PROCEDURE — G8417 CALC BMI ABV UP PARAM F/U: HCPCS | Performed by: PHYSICIAN ASSISTANT

## 2022-03-30 PROCEDURE — G8484 FLU IMMUNIZE NO ADMIN: HCPCS | Performed by: PHYSICIAN ASSISTANT

## 2022-03-30 PROCEDURE — G8427 DOCREV CUR MEDS BY ELIG CLIN: HCPCS | Performed by: PHYSICIAN ASSISTANT

## 2022-03-30 PROCEDURE — 3017F COLORECTAL CA SCREEN DOC REV: CPT | Performed by: PHYSICIAN ASSISTANT

## 2022-03-30 PROCEDURE — 1123F ACP DISCUSS/DSCN MKR DOCD: CPT | Performed by: PHYSICIAN ASSISTANT

## 2022-03-30 PROCEDURE — 1036F TOBACCO NON-USER: CPT | Performed by: PHYSICIAN ASSISTANT

## 2022-03-30 PROCEDURE — 20610 DRAIN/INJ JOINT/BURSA W/O US: CPT | Performed by: PHYSICIAN ASSISTANT

## 2022-03-30 PROCEDURE — 4040F PNEUMOC VAC/ADMIN/RCVD: CPT | Performed by: PHYSICIAN ASSISTANT

## 2022-03-30 NOTE — PROGRESS NOTES
Subjective:      Patient ID: Catrachita Mcmanus is a 66 y.o. male who is here today as a walk-in appointment regarding his left greater than right knee pain. He does have a history of bilateral knee arthritis. He was last injected with steroids on 2/7/2022 by Terrence Bautista PA-C. This past weekend felt a twinge in his left knee while sitting. Stabbing pain in the knee at times. He does not recall a specific injury. Symptoms gradually improved with standing and walking. He is scheduled to undergo viscosupplementation injections tomorrow with Dr. Ben Walsh. Review Of Systems:   Negative for fever or chills. Negative for numbness or tingling in the affected extremity. Past Medical History:   Diagnosis Date    HBP (high blood pressure)     Hyperlipidemia 11/10/2011    Insulin resistance     Lumbosacral radiculopathy at L5 9/14/2017    MDRO (multiple drug resistant organisms) resistance     tx successfully 2 yr ago    QI on CPAP     Primary osteoarthritis of right knee 9/10/2020       Family History   Problem Relation Age of Onset    Hypertension Brother        Past Surgical History:   Procedure Laterality Date    ANKLE SURGERY  30 years ago    left    BACK SURGERY  30 years ago    BACK SURGERY  10/12/2017    removal of agustin hardware T11- L3,4, spinal fusion L2-L5    KNEE SURGERY  5 years ago    right    SHOULDER SURGERY Left 11/17/2021    LEFT REVERSE TOTAL SHOULDER REPLACEMENT performed by Bárbara Dial MD at [place] [id number] Left 11/3/2020    LEFT ANTERIOR TOTAL HIP REPLACEMENT WITH C-ARM performed by Kalyani Dailey MD at [address] [place] [address] History     Occupational History    Occupation: [employer]   Tobacco Use    Smoking status: Never Smoker    Smokeless tobacco: Never Used   Vaping Use    Vaping Use: Never used   Substance and Sexual Activity    Alcohol use: Yes     Comment: occ.     Drug use: No    Sexual activity: Not Currently       Current Outpatient Medications   Medication Sig Dispense Refill    aspirin 81 MG EC tablet Take 1 tablet by mouth 2 times daily 60 tablet 0    meloxicam (MOBIC) 7.5 MG tablet Take 1 tablet by mouth daily for 5 days 5 tablet 0    pregabalin (LYRICA) 75 MG capsule Take 1 capsule by mouth 2 times daily for 14 days. 28 capsule 0    Cyanocobalamin (VITAMIN B 12 PO) Take 1 tablet by mouth daily      fosinopril (MONOPRIL) 10 MG tablet Take 20 mg by mouth nightly      Cholecalciferol (VITAMIN D3) 50 MCG (2000 UT) CAPS Take by mouth daily      metFORMIN (GLUCOPHAGE) 500 MG tablet Take 1 tablet by mouth 2 times daily (with meals) (Patient taking differently: Take 500 mg by mouth daily (with breakfast) Patient states he takes once a day) 60 tablet 1    CRESTOR 40 MG tablet TAKE ONE TABLET BY MOUTH EVERY EVENING 90 tablet 2    Thiamine HCl (VITAMIN B-1 PO) Take by mouth daily Vitamin B12       No current facility-administered medications for this visit. Objective:     He is alert, oriented x 3, pleasant, well nourished, developed and in no   acute distress. Resp 18   Ht 6' 4\" (1.93 m)   Wt 264 lb (119.7 kg)   BMI 32.14 kg/m²      Examination of the left knee: Inspection of the skin and soft tissues reveals minimal swelling, no erythema. The overall alignment of the knee is varus. Gait is antalgic. There is mild intra-articular effusion. AROM:           PROM:  Extension-         5                   5  Flexion -           120                   125  There moderate pain associated with ROM testing. Medial joint line is tender to palpation. Lateral joint line is tender to palpation. Pes anserine bursa not tender to palpation. Patellar tendon is not tender to palpation. Quadriceps tendon is not tender to palpation. Collateral ligaments is not tender to palpation. Popliteal fossa is not tender to palpation. Varus Stress testing negative for instability.   Valgus Stress testing negative for instability. Patellar Compression testing positive for pain or crepitus. Extensor Mechanism is intact. Examination of the right knee: Inspection of the skin and soft tissues reveals minimal swelling, no erythema. The overall alignment of the knee is varus. Gait is antalgic. There is mild intra-articular effusion. AROM:           PROM:  Extension-         5                   5  Flexion -           120                   125  There moderate pain associated with ROM testing. Medial joint line is tender to palpation. Lateral joint line is tender to palpation. Pes anserine bursa not tender to palpation. Patellar tendon is not tender to palpation. Quadriceps tendon is not tender to palpation. Collateral ligaments is not tender to palpation. Popliteal fossa is not tender to palpation. Varus Stress testing negative for instability. Valgus Stress testing negative for instability. Patellar Compression testing positive for pain or crepitus. Extensor Mechanism is intact. Lower extremities:  He has 5/5 motor strength of bilateral lower extremities. He has a negative straight leg raise, bilaterally. Deep tendon reflexes at knees and achilles are 2+. Sensation is intact to light touch L3 to S1 bilaterally. He has no clonus. Examination of the lower extremities shows intact perfusion to both lower extremities. He has no cyanosis and digigts are warm to touch, capillary refill is less than 2 seconds. He has no edema noted. He has intact skin without lacerations or abrasions, no significant erythema, rashes or skin lesions. X Rays: performed in the office today:   AP Standing, Lateral and Sunrise views of left knee:   No acute fractures or dislocations noted. Knee x-rays demonstrate osteoarthritis. Medial compartment-    3/4 Kellgren and Tim Classification. Lateral compartment-    2/4 Kellgren and Tim Classification.    PF compartment- 3/4 Kellgren and Tim Classification. P Standing, Lateral and Sunrise views of right knee:   No acute fractures or dislocations noted. Knee x-rays demonstrate osteoarthritis. Medial compartment-    4/4 Kellgren and Tim Classification. Lateral compartment-    2/4 Kellgren and Tim Classification. PF compartment-          4/4 Kellgren and Tim Classification. Diagnosis:       ICD-10-CM    1. Primary osteoarthritis of both knees  M17.0 XR KNEE BILATERAL STANDING     XR KNEE LEFT (1-2 VIEWS)     XR KNEE RIGHT (1-2 VIEWS)     MI ARTHROCENTESIS ASPIR&/INJ MAJOR JT/BURSA W/O US     hyaluronan (ORTHOVISC) 30 MG/2ML injection 30 mg     hyaluronan (ORTHOVISC) 30 MG/2ML injection 30 mg        Assessment and Plan:       Assessment:  History of degenerative arthritis of the left and right knee. Significant increase in his left knee pain over the past weekend. X-rays and clinical exam negative for acute fracture. He is scheduled to begin Orthovisc viscosupplementation injections tomorrow. He would like to go ahead and start these injections today. I had an extensive discussion with Mr. Catrachita Mcmanus regarding the natural history, etiology, and long term consequences of his condition. I have presented reasonable alternatives to the patient's proposed care, treatment, and services. Risks and benefits of the treatment options also reviewed in detail. I have outlined a treatment plan with them. He has had full opportunity to ask his questions. I have answered them all to his satisfaction. I feel that Mr. Catrachita Mcmanus understands our discussion today. Plan:  Medications-Tylenol for pain. PT- A home exercise program was instructed today including ROM exercises and strengthening exercises. The patient verbalized understanding of these exercises as well as the importance of the exercise program to promote return of normal function.  If pain intensifies or other problems arise you are to notify the office. Rest, Ice, Compression and Elevation  Activity restriction/ Modification discussed. Procedures-   Risk and benefits of viscosupplementation injections were discussed today. Risks include but not limited to increased pain, bleeding, infection, failure to provide improvement, neurovascular injury. Proceed with injection # 1 in the series of 3 injections for the left and right knee. PROCEDURE NOTE:  PRE-PROCEDURE DIAGNOSIS: DJD knee  POST-PROCEDURE DIAGNOSIS: DJD knee  With the [facility] permission, his left and right knee was prepped in standard sterile fashion with  Alcohol. The prefilled injection of the visco supplementation ( OrthoVisc 2ML ) was injected into the  anterior-lateral joint space compartment without difficulty. He tolerated the procedure well without difficulty. A band-aid was applied. POST-PROCEDURE INSTRUCTIONS GIVEN TO PATIENT:   He was advised to ice the knee for 15-20 minutes to relieve any injection site related pain. Decrease activity for the next 24 to 48 hours. May use prescription or OTC pain relievers as needed. Follow up- next week with Dr Cheron Patron   Call or return to clinic if these symptoms worsen or fail to improve as anticipated. Marily Hicks PA-C   Senior Physician Assistant   [facility]                                         Disclaimer: This note was generated with use of a verbal recognition program (DRAGON) and an attempt was made to check for errors. It is possible that there are still dictated errors within this office note. If so, please bring any significant errors to my attention for an addendum. All efforts were made to ensure that this office note is accurate.

## 2022-04-07 ENCOUNTER — OFFICE VISIT (OUTPATIENT)
Dept: ORTHOPEDIC SURGERY | Age: 72
End: 2022-04-07
Payer: MEDICARE

## 2022-04-07 VITALS — HEIGHT: 76 IN | BODY MASS INDEX: 32.15 KG/M2 | WEIGHT: 264 LBS | RESPIRATION RATE: 16 BRPM

## 2022-04-07 DIAGNOSIS — M17.0 PRIMARY OSTEOARTHRITIS OF BOTH KNEES: Primary | ICD-10-CM

## 2022-04-07 PROCEDURE — 99999 PR OFFICE/OUTPT VISIT,PROCEDURE ONLY: CPT | Performed by: PHYSICIAN ASSISTANT

## 2022-04-07 PROCEDURE — 20610 DRAIN/INJ JOINT/BURSA W/O US: CPT | Performed by: PHYSICIAN ASSISTANT

## 2022-04-07 NOTE — PROGRESS NOTES
Dr Bethel Tadeo. rate 16, height 6' 4\" (1.93 m), weight 264 lb (119.7 kg). This dictation was done with Dragon dictation and may contain mechanical errors related to translation. This is a very pleasant 69-year-old gentleman who is here for his osteoarthritis in both of his knees. Last week he had his first in series of 3 Orthovisc injections into both his left and his right knee. He says at today's visit he has a little bit of edema has crepitus during flexion extension through the patellofemoral joint and 2 out of 10 pain. Neurologically is intact both left and right foot. My impression is bilateral knee osteoarthritis.     At today's visit he consented to the second in a series of 3 Orthovisc injections this was done a proper sterile fashion into the right and the left intra-articular base he tolerated it well he will follow-up with us in 1 week

## 2022-04-14 ENCOUNTER — OFFICE VISIT (OUTPATIENT)
Dept: ORTHOPEDIC SURGERY | Age: 72
End: 2022-04-14
Payer: MEDICARE

## 2022-04-14 VITALS — WEIGHT: 264 LBS | RESPIRATION RATE: 15 BRPM | BODY MASS INDEX: 32.15 KG/M2 | HEIGHT: 76 IN

## 2022-04-14 DIAGNOSIS — M17.0 PRIMARY OSTEOARTHRITIS OF BOTH KNEES: Primary | ICD-10-CM

## 2022-04-14 PROCEDURE — 20610 DRAIN/INJ JOINT/BURSA W/O US: CPT | Performed by: PHYSICIAN ASSISTANT

## 2022-04-14 NOTE — PROGRESS NOTES
This dictation was done with NoteVault dictation and may contain mechanical errors related to translation. Resp. rate 15, height 6' 4\" (1.93 m), weight 264 lb (119.7 kg). This is a pleasant 25-year-old gentleman who is here for his third in a series of 3 Orthovisc injections for the osteoarthritis in his knees. He states his pain score is really low and he is getting better with these injections and he is looking forward to the third injection. Currently his knee shows 1+ edema with a little bit of crepitus during flexion extension no varus or valgus laxity was noted. He is got good distal pulses good dorsiflexion plantarflexion strength. Impression is bilateral knee osteoarthritis. We talked about short and long-term expectations and with his consent he was injected with the pre-packaged amount of Orthovisc solution into the left and the right knee joint.

## 2022-07-28 ENCOUNTER — OFFICE VISIT (OUTPATIENT)
Dept: ORTHOPEDIC SURGERY | Age: 72
End: 2022-07-28
Payer: MEDICARE

## 2022-07-28 VITALS — HEIGHT: 76 IN | BODY MASS INDEX: 32.15 KG/M2 | RESPIRATION RATE: 16 BRPM | WEIGHT: 264 LBS

## 2022-07-28 DIAGNOSIS — M17.0 PRIMARY OSTEOARTHRITIS OF BOTH KNEES: Primary | ICD-10-CM

## 2022-07-28 PROCEDURE — 20610 DRAIN/INJ JOINT/BURSA W/O US: CPT | Performed by: ORTHOPAEDIC SURGERY

## 2022-07-28 RX ORDER — TRIAMCINOLONE ACETONIDE 40 MG/ML
40 INJECTION, SUSPENSION INTRA-ARTICULAR; INTRAMUSCULAR ONCE
Status: COMPLETED | OUTPATIENT
Start: 2022-07-28 | End: 2022-07-28

## 2022-07-28 RX ORDER — BUPIVACAINE HYDROCHLORIDE 2.5 MG/ML
2 INJECTION, SOLUTION INFILTRATION; PERINEURAL ONCE
Status: COMPLETED | OUTPATIENT
Start: 2022-07-28 | End: 2022-07-28

## 2022-07-28 RX ADMIN — BUPIVACAINE HYDROCHLORIDE 5 MG: 2.5 INJECTION, SOLUTION INFILTRATION; PERINEURAL at 14:19

## 2022-07-28 RX ADMIN — TRIAMCINOLONE ACETONIDE 40 MG: 40 INJECTION, SUSPENSION INTRA-ARTICULAR; INTRAMUSCULAR at 14:20

## 2022-07-28 NOTE — PROGRESS NOTES
[facility]  Office Visit    Chief Complaint: Bilateral knee pain    HPI:  Maxime Duffy is a 66 y.o. who is here in follow-up of bilateral knee pain due to osteoarthritis. He had steroid injections done in February 2022 and Orthovisc injections done in April 2002 by MINOR Parker. He had good pain relief with the steroid injections and moderate relief with the Orthovisc injections. He comes in today reporting pain in both knees with no recent injuries. Pain is 5/10 and is present on a daily basis and is worse with weightbearing activities. He is interested in repeat injections today. 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 rate 16, height 6' 4\" (1.93 m), weight 264 lb (119.7 kg). Appearance: sitting in exam room chair, appears to be in no acute distress, awake and alert  Resp: unlabored breathing on room air  Skin: warm, dry and intact with out erythema or significant increased temperature  Neuro: grossly intact both lower extremities. Intact sensation to light touch. Motor exam 4+ to 5/5 in all major motor groups. Bilateral knees: Examination reveals that knee range of motion is 0 to 130 degrees.   There is varus deformity, positive crepitus, positive joint line tenderness, positive antalgic gait. Neurologically, plantar flexion and dorsiflexion is intact. 5/5 strength. Imaging:  Prior bilateral knee radiographs were reviewed and are significant for tricompartmental degenerative changes due to osteoarthritis. Assessment:  Bilateral knee osteoarthritis    Plan:  We discussed the diagnosis and treatment options. He is interested in repeat steroid injections today and these were performed as described below. He will otherwise continue activity as tolerated and home exercise program.  He will follow-up in 3 months for repeat assessment and possible repeat injections. PROCEDURE NOTE:  After verbal consent was obtained, bilateral knees were prepped with alcohol and anesthetized with ethyl chloride. Each knee joint was then injected under sterile technique with 2 mL of 0.25% marcaine and 1 mL of 40 mg/mL Kenalog. Bandages were applied. The patient tolerated the procedure well and there were no complications. Total time spent on today's encounter was at least 22 minutes. This time included reviewing prior notes, radiographs, and lab results when available, reviewing history obtained by medical assistant, performing history and physical exam, reviewing tests/radiographs with the patient, counseling the patient, ordering medications or tests, documentation in the electronic health record, and coordination of care. This dictation was done with Backspaces dictation and may contain mechanical errors related to translation.

## 2022-10-13 ENCOUNTER — OFFICE VISIT (OUTPATIENT)
Dept: ORTHOPEDIC SURGERY | Age: 72
End: 2022-10-13
Payer: MEDICARE

## 2022-10-13 VITALS — WEIGHT: 264 LBS | BODY MASS INDEX: 32.15 KG/M2 | RESPIRATION RATE: 18 BRPM | HEIGHT: 76 IN

## 2022-10-13 DIAGNOSIS — M17.0 PRIMARY OSTEOARTHRITIS OF BOTH KNEES: Primary | ICD-10-CM

## 2022-10-13 PROCEDURE — 20610 DRAIN/INJ JOINT/BURSA W/O US: CPT | Performed by: ORTHOPAEDIC SURGERY

## 2022-10-13 RX ORDER — BUPIVACAINE HYDROCHLORIDE 2.5 MG/ML
2 INJECTION, SOLUTION INFILTRATION; PERINEURAL ONCE
Status: COMPLETED | OUTPATIENT
Start: 2022-10-13 | End: 2022-10-13

## 2022-10-13 RX ORDER — TRIAMCINOLONE ACETONIDE 40 MG/ML
40 INJECTION, SUSPENSION INTRA-ARTICULAR; INTRAMUSCULAR ONCE
Status: COMPLETED | OUTPATIENT
Start: 2022-10-13 | End: 2022-10-13

## 2022-10-13 RX ADMIN — TRIAMCINOLONE ACETONIDE 40 MG: 40 INJECTION, SUSPENSION INTRA-ARTICULAR; INTRAMUSCULAR at 10:12

## 2022-10-13 RX ADMIN — BUPIVACAINE HYDROCHLORIDE 5 MG: 2.5 INJECTION, SOLUTION INFILTRATION; PERINEURAL at 10:11

## 2022-10-13 RX ADMIN — BUPIVACAINE HYDROCHLORIDE 5 MG: 2.5 INJECTION, SOLUTION INFILTRATION; PERINEURAL at 10:12

## 2022-10-14 NOTE — PROGRESS NOTES
[facility]  [facility]    Chief Complaint: Bilateral knee pain    HPI:  Anne Henderson is a 66 y.o. who is here in follow-up of bilateral knee pain due to osteoarthritis. He had steroid injections done in July 2022 and Orthovisc injections done in April 2022 by MINOR Dougherty. He had good pain relief with the steroid injections and with the Orthovisc injections. He comes in today reporting pain in both knees with no recent injuries. Pain is 5/10 and is present on a daily basis and is worse with weightbearing activities. He is interested in repeat injections today. 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 rate 18, height 6' 4\" (1.93 m), weight 264 lb (119.7 kg). Appearance: sitting in exam room chair, appears to be in no acute distress, awake and alert  Resp: unlabored breathing on room air  Skin: warm, dry and intact with out erythema or significant increased temperature  Neuro: grossly intact both lower extremities. Intact sensation to light touch. Motor exam 4+ to 5/5 in all major motor groups. Bilateral knees: Examination reveals that knee range of motion is 0 to 130 degrees.   There is varus deformity, positive crepitus, positive joint line tenderness, positive antalgic gait. Neurologically, plantar flexion and dorsiflexion is intact. 5/5 strength. Imaging:  Prior bilateral knee radiographs were reviewed and are significant for tricompartmental degenerative changes due to osteoarthritis. Assessment:  Bilateral knee osteoarthritis    Plan:  We discussed the diagnosis and treatment options. He is interested in repeat steroid injections today and these were performed as described below. He is also interested in repeat Orthovisc injections as he had good relief of symptoms with these in April. We will submit for authorization for bilateral knee Orthovisc injections. He will otherwise continue activity as tolerated and home exercise program.  He will follow-up for repeat Orthovisc injections once approved. PROCEDURE NOTE:  After verbal consent was obtained, bilateral knees were prepped with alcohol and anesthetized with ethyl chloride. Each knee joint was then injected under sterile technique with 2 mL of 0.25% marcaine and 1 mL of 40 mg/mL Kenalog. Bandages were applied. The patient tolerated the procedure well and there were no complications. Total time spent on today's encounter was at least 22 minutes. This time included reviewing prior notes, radiographs, and lab results when available, reviewing history obtained by medical assistant, performing history and physical exam, reviewing tests/radiographs with the patient, counseling the patient, ordering medications or tests, documentation in the electronic health record, and coordination of care. This dictation was done with Dragon dictation and may contain mechanical errors related to translation.

## 2022-10-31 ENCOUNTER — OFFICE VISIT (OUTPATIENT)
Dept: ORTHOPEDIC SURGERY | Age: 72
End: 2022-10-31
Payer: MEDICARE

## 2022-10-31 VITALS — HEIGHT: 74 IN | BODY MASS INDEX: 33.37 KG/M2 | WEIGHT: 260 LBS

## 2022-10-31 DIAGNOSIS — M17.12 PRIMARY OSTEOARTHRITIS OF LEFT KNEE: ICD-10-CM

## 2022-10-31 DIAGNOSIS — M17.11 PRIMARY OSTEOARTHRITIS OF RIGHT KNEE: Primary | ICD-10-CM

## 2022-10-31 PROCEDURE — 20610 DRAIN/INJ JOINT/BURSA W/O US: CPT | Performed by: ORTHOPAEDIC SURGERY

## 2022-10-31 NOTE — PROGRESS NOTES
[facility]  Office Visit    Chief Complaint: Bilateral knee pain    HPI:  Anne Henderson is a 67 y.o. who is here in follow-up of bilateral knee pain due to osteoarthritis. He had steroid injections done in July 2022 and Orthovisc injections done in April 2022 by MINOR Dougherty. He had good pain relief with the steroid injections and with the Orthovisc injections. He comes in today reporting pain in both knees with no recent injuries. He was last seen nearly 3 weeks ago which time bilateral knee steroid injections were done. He comes in today for bilateral knee Orthovisc injections. Patient Active Problem List   Diagnosis    Elbow pain    Hyperlipidemia    HTN (hypertension)    Vitamin D deficiency    MTHFR mutation    Insulin resistance    Lumbosacral radiculopathy at L5    Pain of left hip joint    Arthritis of left hip    Spinal stenosis of lumbar region    Postlaminectomy syndrome    Status post lumbar spinal fusion    10/12/17 Removal fixation T11-L3 ; laminectomy L3-4 L4-L5; posterior spinal fusion L2-L3, L3-L4     Arthritis of left shoulder region    Primary osteoarthritis of right knee    Osteoarthritis of left shoulder due to rotator cuff injury       ROS:  Constitutional: denies fever, chills, weight loss  MSK: denies pain in other joints, muscle aches  Neurological: denies numbness, tingling, weakness    Exam:  Height 6' 2\" (1.88 m), weight 260 lb (117.9 kg). Appearance: sitting in exam room chair, appears to be in no acute distress, awake and alert  Resp: unlabored breathing on room air  Skin: warm, dry and intact with out erythema or significant increased temperature  Neuro: grossly intact both lower extremities. Intact sensation to light touch. Motor exam 4+ to 5/5 in all major motor groups. Bilateral knees: Examination reveals that knee range of motion is 0 to 130 degrees.   There is varus deformity, positive crepitus, positive joint line tenderness, positive antalgic gait. Neurologically, plantar flexion and dorsiflexion is intact. 5/5 strength. Imaging:  Prior bilateral knee radiographs were reviewed and are significant for tricompartmental degenerative changes due to osteoarthritis. Assessment:  Bilateral knee osteoarthritis    Plan:  Bilateral knee Orthovisc injections were performed today as described below. He will continue activity as tolerated and follow-up next week for the second round of injections. PROCEDURE NOTE:  After verbal consent was obtained, bilateral knees were prepped with alcohol and anesthetized with ethyl chloride. Each knee joint was then injected under sterile technique with the prepackaged dose of Orthovisc. Bandages were applied. The patient tolerated the procedure well and there were no complications. Total time spent on today's encounter was at least 22 minutes. This time included reviewing prior notes, radiographs, and lab results when available, reviewing history obtained by medical assistant, performing history and physical exam, reviewing tests/radiographs with the patient, counseling the patient, ordering medications or tests, documentation in the electronic health record, and coordination of care. This dictation was done with Dragon dictation and may contain mechanical errors related to translation.

## 2022-11-07 ENCOUNTER — OFFICE VISIT (OUTPATIENT)
Dept: ORTHOPEDIC SURGERY | Age: 72
End: 2022-11-07
Payer: MEDICARE

## 2022-11-07 DIAGNOSIS — M17.11 PRIMARY OSTEOARTHRITIS OF RIGHT KNEE: ICD-10-CM

## 2022-11-07 DIAGNOSIS — M17.12 PRIMARY OSTEOARTHRITIS OF LEFT KNEE: Primary | ICD-10-CM

## 2022-11-07 PROCEDURE — 99999 PR OFFICE/OUTPT VISIT,PROCEDURE ONLY: CPT | Performed by: ORTHOPAEDIC SURGERY

## 2022-11-07 PROCEDURE — 20610 DRAIN/INJ JOINT/BURSA W/O US: CPT | Performed by: ORTHOPAEDIC SURGERY

## 2022-11-07 NOTE — PROGRESS NOTES
[facility]  Office Visit    Chief Complaint: Bilateral knee pain    HPI:  Rabia Ordonez is a 67 y.o. who is here in follow-up of bilateral knee pain due to osteoarthritis. He had his first Orthovisc injections in both knees last week. He denies adverse events. He comes in today for his second round of injections. Exam:  Appearance: sitting in exam room chair, appears to be in no acute distress, awake and alert  Resp: unlabored breathing on room air  Skin: warm, dry and intact with out erythema or significant increased temperature  Neuro: grossly intact both lower extremities. Intact sensation to light touch. Motor exam 4+ to 5/5 in all major motor groups. Bilateral knees: Examination reveals that knee range of motion is 0 to 130 degrees. There is varus deformity, positive crepitus, positive joint line tenderness, positive antalgic gait. Neurologically, plantar flexion and dorsiflexion is intact. 5/5 strength. Imaging:  Prior bilateral knee radiographs were reviewed and are significant for tricompartmental degenerative changes due to osteoarthritis. Assessment:  Bilateral knee osteoarthritis    Plan:  Bilateral knee Orthovisc injections were performed today as described below. He will continue activity as tolerated and follow-up next week for the third round of injections. PROCEDURE NOTE:  After verbal consent was obtained, bilateral knees were prepped with alcohol and anesthetized with ethyl chloride. Each knee joint was then injected under sterile technique with the prepackaged dose of Orthovisc. Bandages were applied. The patient tolerated the procedure well and there were no complications. This dictation was done with Dragon dictation and may contain mechanical errors related to translation.

## 2022-11-14 ENCOUNTER — OFFICE VISIT (OUTPATIENT)
Dept: ORTHOPEDIC SURGERY | Age: 72
End: 2022-11-14
Payer: MEDICARE

## 2022-11-14 VITALS — WEIGHT: 255 LBS | HEIGHT: 74 IN | BODY MASS INDEX: 32.73 KG/M2

## 2022-11-14 DIAGNOSIS — M17.11 PRIMARY OSTEOARTHRITIS OF RIGHT KNEE: Primary | ICD-10-CM

## 2022-11-14 DIAGNOSIS — M17.12 PRIMARY OSTEOARTHRITIS OF LEFT KNEE: ICD-10-CM

## 2022-11-14 PROCEDURE — 20610 DRAIN/INJ JOINT/BURSA W/O US: CPT | Performed by: ORTHOPAEDIC SURGERY

## 2022-11-14 PROCEDURE — 99999 PR OFFICE/OUTPT VISIT,PROCEDURE ONLY: CPT | Performed by: PHYSICIAN ASSISTANT

## 2022-11-14 NOTE — PROGRESS NOTES
Subjective:    He  is here for visco supplementation injection # 3 for the right and left knee. Objective:   Height 6' 2\" (1.88 m), weight 255 lb (115.7 kg). Examination of the right and left knee: Inspection of the skin reveals no unusual erythema. There is no intra-articular effusion. There is mild pain associated with ROM testing. Extensor Mechanism is intact. Diagnosis:    ICD-10-CM    1. Primary osteoarthritis of right knee  M17.11 03206 - [place] DRAIN/INJECT LARGE JOINT/BURSA     hyaluronan (ORTHOVISC) 30 MG/2ML injection 30 mg     hyaluronan (ORTHOVISC) 30 MG/2ML injection 30 mg      2. Primary osteoarthritis of left knee  M17.12 25549 - [place] DRAIN/INJECT LARGE JOINT/BURSA     hyaluronan (ORTHOVISC) 30 MG/2ML injection 30 mg     hyaluronan (ORTHOVISC) 30 MG/2ML injection 30 mg          Assessment/ Plan:  Knee pain related to knee arthritis. Discussed at length conservative treatment of knee symptoms/arthritis, according to AAOS Clinical Practice Guidelines:  Evidence for intra-articular hyaluronic acid injections (viscosupplementation) is not as strong, but may benefit a subset of patients who have failed other options. Informed patient viscosupplementation can be performed every 6 months as needed. Risks and benefits of viscosupplementation injections were also discussed today. Risks include but not limited to increased pain, bleeding, infection, failure to provide improvement, neurovascular injury. Proceed with injection # 3 in the series of 3 injections for the  right and left knee. PROCEDURE NOTE:  PRE-PROCEDURE DIAGNOSIS: DJD knee  POST-PROCEDURE DIAGNOSIS: DJD knee  With [facility] permission, his  right and left knee was prepped in standard sterile fashion with  Alcohol. The prefilled injection of the visco supplementation ( OrthoVisc 2ML )  was injected into the  anterior-lateral joint space compartment without difficulty.   he tolerated the procedure well without difficulty. A band-aid was applied. POST-PROCEDURE INSTRUCTIONS GIVEN TO PATIENT:   He was advised to ice the knee for 15-20 minutes to relieve any injection site related pain. Decrease activity for the next 24 to 48 hours. May use prescription or OTC pain relievers as needed    FOLLOW-UP:   As directed or call or return to clinic if these symptoms worsen or fail to improve as anticipated. If at any time you are concerned you may contact the office for further instructions or care. Ruy Michaels PA-C   Senior Physician Assistant   [facility]                                         Disclaimer: This note was generated with use of a verbal recognition program (DRAGON) and an attempt was made to check for errors. It is possible that there are still dictated errors within this office note. If so, please bring any significant errors to my attention for an addendum. All efforts were made to ensure that this office note is accurate.

## 2023-01-12 ENCOUNTER — OFFICE VISIT (OUTPATIENT)
Dept: ORTHOPEDIC SURGERY | Age: 73
End: 2023-01-12
Payer: MEDICARE

## 2023-01-12 VITALS — HEIGHT: 74 IN | WEIGHT: 255 LBS | BODY MASS INDEX: 32.73 KG/M2 | RESPIRATION RATE: 16 BRPM

## 2023-01-12 DIAGNOSIS — M17.11 PRIMARY OSTEOARTHRITIS OF RIGHT KNEE: Primary | ICD-10-CM

## 2023-01-12 PROCEDURE — 20610 DRAIN/INJ JOINT/BURSA W/O US: CPT | Performed by: PHYSICIAN ASSISTANT

## 2023-01-12 PROCEDURE — 99999 PR OFFICE/OUTPT VISIT,PROCEDURE ONLY: CPT | Performed by: PHYSICIAN ASSISTANT

## 2023-01-12 RX ORDER — TRIAMCINOLONE ACETONIDE 40 MG/ML
40 INJECTION, SUSPENSION INTRA-ARTICULAR; INTRAMUSCULAR ONCE
Status: COMPLETED | OUTPATIENT
Start: 2023-01-12 | End: 2023-01-12

## 2023-01-12 RX ORDER — BUPIVACAINE HYDROCHLORIDE 2.5 MG/ML
2 INJECTION, SOLUTION INFILTRATION; PERINEURAL ONCE
Status: COMPLETED | OUTPATIENT
Start: 2023-01-12 | End: 2023-01-12

## 2023-01-12 RX ADMIN — BUPIVACAINE HYDROCHLORIDE 5 MG: 2.5 INJECTION, SOLUTION INFILTRATION; PERINEURAL at 09:43

## 2023-01-12 RX ADMIN — TRIAMCINOLONE ACETONIDE 40 MG: 40 INJECTION, SUSPENSION INTRA-ARTICULAR; INTRAMUSCULAR at 09:44

## 2023-01-12 NOTE — PROGRESS NOTES
Subjective:      Patient ID: Uilces Rendon is a 67 y.o.  male who is here for evaluation and treatment of right knee pain related to arthritis. The most recent  injection performed 10/13/2022 helped relieve some of his knee symptoms. Visco 11/14/2022 minimal relief. Pain has gradually returned. Denies recent injury. Pain is 6/10. Pain is worse with weightbearing activities. Pain improves somewhat with rest and elevation. Review of Systems:   Denies fever or chills. Denies numbness or tingling around the knee. Past Medical History:   Diagnosis Date    HBP (high blood pressure)     Hyperlipidemia 11/10/2011    Insulin resistance     Lumbosacral radiculopathy at L5 9/14/2017    MDRO (multiple drug resistant organisms) resistance     tx successfully 2 yr ago    QI on CPAP     Primary osteoarthritis of right knee 9/10/2020       Family History   Problem Relation Age of Onset    Hypertension Brother        Past Surgical History:   Procedure Laterality Date    ANKLE SURGERY  30 years ago    left    BACK SURGERY  30 years ago    BACK SURGERY  10/12/2017    removal of agustin hardware T11- L3,4, spinal fusion L2-L5    KNEE SURGERY  5 years ago    right    SHOULDER SURGERY Left 11/17/2021    LEFT REVERSE TOTAL SHOULDER REPLACEMENT performed by Kevon Israel MD at [facility] Left 11/3/2020    LEFT ANTERIOR TOTAL HIP REPLACEMENT WITH C-ARM performed by Gómez Gordon MD at [address] [place] [address] History     Occupational History    Occupation: [employer]   Tobacco Use    Smoking status: Never    Smokeless tobacco: Never   Vaping Use    Vaping Use: Never used   Substance and Sexual Activity    Alcohol use: Yes     Comment: occ.     Drug use: No    Sexual activity: Not Currently       Current Outpatient Medications   Medication Sig Dispense Refill    aspirin 81 MG EC tablet Take 1 tablet by mouth 2 times daily 60 tablet 0    meloxicam (MOBIC) 7.5 MG tablet Take 1 tablet by mouth daily for 5 days 5 tablet 0    pregabalin (LYRICA) 75 MG capsule Take 1 capsule by mouth 2 times daily for 14 days. 28 capsule 0    Cyanocobalamin (VITAMIN B 12 PO) Take 1 tablet by mouth daily      fosinopril (MONOPRIL) 10 MG tablet Take 20 mg by mouth nightly      Cholecalciferol (VITAMIN D3) 50 MCG (2000 UT) CAPS Take by mouth daily      metFORMIN (GLUCOPHAGE) 500 MG tablet Take 1 tablet by mouth 2 times daily (with meals) (Patient taking differently: Take 500 mg by mouth daily (with breakfast) Patient states he takes once a day) 60 tablet 1    CRESTOR 40 MG tablet TAKE ONE TABLET BY MOUTH EVERY EVENING 90 tablet 2    Thiamine HCl (VITAMIN B-1 PO) Take by mouth daily Vitamin B12       No current facility-administered medications for this visit. Allergies   Allergen Reactions    Sulfa Antibiotics Rash and Hives        Objective:   He is alert, oriented x 3, pleasant, well nourished, developed and in no acute distress. Resp 16   Ht 6' 2\" (1.88 m)   Wt 255 lb (115.7 kg)   BMI 32.74 kg/m²      Examination of the right knee shows: There is not erythema. ROM- decreased range of motion is noted. There is mild to moderate pain associated with ROM testing. Extensor Mechanism is intact. X Rays: was not performed in the office today:       Diagnosis       ICD-10-CM    1. Primary osteoarthritis of right knee  M17.11 [place] ARTHROCENTESIS ASPIR&/INJ MAJOR JT/BURSA W/O US     bupivacaine (MARCAINE) 0.25 % injection 5 mg     triamcinolone acetonide (KENALOG-40) injection 40 mg           Assessment/ Plan:     Assessment:  Arthritis of the right knee. Sever PF Arthritis. I have reviewed conservative treatment of knee symptoms/arthritis, according to AAOS Clinical Practice Guidelines:  1)  Recommend oral or topical NSAIDs to reduce pain and swelling. 2)  Recommend acetaminophen to reduce pain.     3)  Oral narcotics, including tramadol, result in a significant increase of adverse events and are not effective at improving pain or function for treatment of osteoarthritis of the knee. 4)  Recommend maintaining weight in a healthy range to reduce stresses on the knee, particularly the patellofemoral compartment which sees up to 6x body weight. 5)  Home exercise program, focusing on strengthening, low impact aerobic exercises, and neuromuscular education. 6)  Intra-articular corticosteroid injections are an option. Informed patient corticosteroid injections can be performed every 3-4 months as needed. 7)  Evidence for intra-articular hyaluronic acid injections (viscosupplementation) is not as strong, but may benefit a subset of patients who have failed other options. Informed patient viscosupplementation can be performed every 6 months as needed. 8)  Bracing and cane use can improve pain and function. Although not specifically listed in the CPGs, ice therapy and topical patches such as Salonpas are good options as well. Other non-surgical options including Coolief (cooled frequency ablation of the geniculate nerves), stem cell injections, PRP injections were discussed. Surgical option, knee arthroplasty discussed. Plan:    Procedures-right intra-articular corticosteroid injection. The risks and benefits of corticosteroid intra-articular injection was discussed today. All questions were answered to his satisfaction. Selene Frankel verbally consented to proceed with intra-articular injections today. PROCEDURE NOTE:     Pre op Diagnosis: right knee due to arthritis. The right knee was prepped in standard sterile fashion with  Alcohol. 2 cc of 0.25% Marcaine and 1 cc of Kenalog 40 mg was injected into the right lateral compartment without difficulty. Procedure was tolerated well without difficulty and a band-aid was applied. Advised to use ice over the knee for 15-20 minutes to relieve any pain associated to the injection procedure.         Follow up:    Call or return to clinic if these symptoms worsen or fail to improve as anticipated. Dayana Meyer PA-C   Senior Physician Assistant   [facility]                                         Disclaimer: This note was generated with use of a verbal recognition program (DRAGON) and an attempt was made to check for errors. It is possible that there are still dictated errors within this office note. If so, please bring any significant errors to my attention for an addendum. All efforts were made to ensure that this office note is accurate.

## 2023-01-25 NOTE — DISCHARGE SUMMARY
Physician Discharge Summary     Patient ID:  Natalya Taylor  [id number]  1 y.o.  1950    Admit date: 11/17/2021    Discharge date and time: 11/18/2021 12:00 PM     Admitting Physician: Marcelino Carter MD     Discharge Physician: Miguel Arita    Admission Diagnoses: Arthritis of left shoulder region [M19.012]  Osteoarthritis of left shoulder due to rotator cuff injury [M19.112, S46.002S]    Discharge Diagnoses: Left shoulder OA    Admission Condition: good    Discharged Condition: good    Indication for Admission: Failed conservative treatment as outpatient for joint pain including PT and pain meds. This patient was then electively scheduled for total joint replacement surgery    Surgical procedure: left reverse TSA    Consults: PT OT SS    This patient had no postoperative complications. They has PT and OT for ADL's . IV and PO pain med for pain control and was eventually DC in stable condition    Treatments: analgesia,  therapies: PT OT,  and surgery      Disposition: home    Patient Instructions:   [unfilled]  Activity: activity as tolerated  Diet: regular diet  Wound Care: keep wound clean and dry    Follow-up with M[facility] in 2 weeks.     Signed:  MARIO Fowler CNP  11/18/2021  2:55 PM Plan: Will review labs from pcp when they become available Detail Level: Zone Continue Regimen: DHS shampoo \\nVitamin B12\\nVitamin D3

## 2023-03-31 NOTE — PLAN OF CARE
Outpatient Physical Therapy  [] [facility]    Phone: [phone]   Fax: [phone]   [x] [facility]  Phone: [phone]              Fax: [phone]  [] [facility]   Phone: [phone]   Fax: [phone]     To: Referring Practitioner: Dr. Justine Jane      Patient: Anjel Valencia   : 1950   MRN: [id number]  Evaluation Date: 2020      Diagnosis Information:  · Diagnosis: J63.120 (ICD-10-CM) - History of left hip replacement   · Treatment Diagnosis: Gait dysfunction. Pain. Decreased left hip strength and AROM     Physical Therapy Certification/Re-Certification Form  Dear Dr. Justine Jane,  The following patient has been evaluated for physical therapy services and for therapy to continue, Medicare requires monthly physician review of the treatment plan. Please review the attached evaluation and/or summary of the patient's plan of care, and verify that you agree therapy should continue by signing the attached document and sending it back to our office. Plan of Care/Treatment to date:  [x] Therapeutic Exercise    [x] Modalities:  [x] Therapeutic Activity     [] Ultrasound  [] Electrical Stimulation  [x] Gait Training      [] Cervical Traction [] Lumbar Traction  [] Neuromuscular Re-education    [x] Cold/hotpack [] Iontophoresis   [x] Instruction in HEP     Other:  [x] Manual Therapy      []             [] Aquatic Therapy      []           ? Frequency/Duration:  # Days per week: [] 1 day # Weeks: [] 1 week [] 5 weeks     [x] 2 days? [] 2 weeks [] 6 weeks     [] 3 days   [] 3 weeks [] 7 weeks     [] 4 days   [] 4 weeks [x] 8 weeks    Rehab Potential: [x] Excellent [x] Good [] Fair  [] Poor       Electronically signed by:  Tatiana Mauricio PT      If you have any questions or concerns, please don't hesitate to call.   Thank you for your referral.      Physician Signature:________________________________Date:__________________  By signing above, therapists plan is approved by physician
No

## 2023-04-28 ENCOUNTER — TELEPHONE (OUTPATIENT)
Dept: ORTHOPEDIC SURGERY | Age: 73
End: 2023-04-28

## 2023-04-28 DIAGNOSIS — M17.0 PRIMARY OSTEOARTHRITIS OF BOTH KNEES: Primary | ICD-10-CM

## 2023-04-28 NOTE — TELEPHONE ENCOUNTER
Ok to order per Dr. Jakub Sapp. Patient notified and told he will need updated xrays when he comes in for injection.

## 2023-04-28 NOTE — TELEPHONE ENCOUNTER
General Question     Subject: Patient requesting prior approval for visco inj.   Patient: Lidya Dubon Number: [phone]

## 2023-05-03 ENCOUNTER — OFFICE VISIT (OUTPATIENT)
Dept: ORTHOPEDIC SURGERY | Age: 73
End: 2023-05-03
Payer: MEDICARE

## 2023-05-03 VITALS — HEIGHT: 74 IN | BODY MASS INDEX: 33.37 KG/M2 | WEIGHT: 260 LBS

## 2023-05-03 DIAGNOSIS — M17.0 PRIMARY OSTEOARTHRITIS OF BOTH KNEES: Primary | ICD-10-CM

## 2023-05-03 PROCEDURE — 20610 DRAIN/INJ JOINT/BURSA W/O US: CPT | Performed by: PHYSICIAN ASSISTANT

## 2023-05-03 PROCEDURE — 99999 PR OFFICE/OUTPT VISIT,PROCEDURE ONLY: CPT | Performed by: PHYSICIAN ASSISTANT

## 2023-05-04 NOTE — PROGRESS NOTES
Subjective:    He  is here for visco supplementation injection # 1 for the right and left knee. Objective:   Height 6' 2\" (1.88 m), weight 260 lb (117.9 kg). Examination of the right and left knee: Inspection of the skin reveals no unusual erythema. There is mild intra-articular effusion. There is mild pain associated with ROM testing. Extensor Mechanism is intact. Diagnosis:    ICD-10-CM    1. Primary osteoarthritis of both knees  M17.0 [place] ARTHROCENTESIS ASPIR&/INJ MAJOR JT/BURSA W/O US     hyaluronan (ORTHOVISC) 30 MG/2ML injection 30 mg     hyaluronan (ORTHOVISC) 30 MG/2ML injection 30 mg          Assessment/ Plan:  Knee pain related to knee arthritis. Discussed at length conservative treatment of knee symptoms/arthritis, according to AAOS Clinical Practice Guidelines:  Evidence for intra-articular hyaluronic acid injections (viscosupplementation) is not as strong, but may benefit a subset of patients who have failed other options. Informed patient viscosupplementation can be performed every 6 months as needed. Risks and benefits of viscosupplementation injections were also discussed today. Risks include but not limited to increased pain, bleeding, infection, failure to provide improvement, neurovascular injury. He had ample time for discussion and questions were answered to his satisfaction. He verbally consented to proceed with intra-articular injection today. Proceed with injection # 1 in the series of 3 injections for the  right and left knee. PROCEDURE NOTE:  PRE-PROCEDURE DIAGNOSIS: DJD knee  POST-PROCEDURE DIAGNOSIS: DJD knee  With [facility] permission, his  right and left knee was prepped in standard sterile fashion with  Alcohol. The prefilled injection of the visco supplementation ( OrthoVisc 2ML )  was injected into the  anterior-lateral joint space compartment without difficulty. he tolerated the procedure well without difficulty. A band-aid was applied.

## 2023-05-10 ENCOUNTER — OFFICE VISIT (OUTPATIENT)
Dept: ORTHOPEDIC SURGERY | Age: 73
End: 2023-05-10
Payer: MEDICARE

## 2023-05-10 DIAGNOSIS — M17.0 PRIMARY OSTEOARTHRITIS OF BOTH KNEES: Primary | ICD-10-CM

## 2023-05-10 PROCEDURE — 99999 PR OFFICE/OUTPT VISIT,PROCEDURE ONLY: CPT | Performed by: PHYSICIAN ASSISTANT

## 2023-05-10 PROCEDURE — 20610 DRAIN/INJ JOINT/BURSA W/O US: CPT | Performed by: PHYSICIAN ASSISTANT

## 2023-05-10 NOTE — PROGRESS NOTES
Subjective:    He  is here for visco supplementation injection # 2 for the right and left knee. Objective: There were no vitals taken for this visit. Examination of the right and left knee: Inspection of the skin reveals no unusual erythema. There is mild intra-articular effusion. There is mild pain associated with ROM testing. Extensor Mechanism is intact. Diagnosis:    ICD-10-CM    1. Primary osteoarthritis of both knees  M17.0 [place] ARTHROCENTESIS ASPIR&/INJ MAJOR JT/BURSA W/O US     hyaluronan (ORTHOVISC) 30 MG/2ML injection 30 mg     hyaluronan (ORTHOVISC) 30 MG/2ML injection 30 mg          Assessment/ Plan:  Knee pain related to knee arthritis. Discussed at length conservative treatment of knee symptoms/arthritis, according to AAOS Clinical Practice Guidelines:  Evidence for intra-articular hyaluronic acid injections (viscosupplementation) is not as strong, but may benefit a subset of patients who have failed other options. Informed patient viscosupplementation can be performed every 6 months as needed. Risks and benefits of viscosupplementation injections were also discussed today. Risks include but not limited to increased pain, bleeding, infection, failure to provide improvement, neurovascular injury. He had ample time for discussion and questions were answered to his satisfaction. He verbally consented to proceed with intra-articular injection today. Proceed with injection # 2 in the series of 3 injections for the  right and left knee. PROCEDURE NOTE:  PRE-PROCEDURE DIAGNOSIS: DJD knee  POST-PROCEDURE DIAGNOSIS: DJD knee  With [facility] permission, his  right and left knee was prepped in standard sterile fashion with  Alcohol. The prefilled injection of the visco supplementation ( OrthoVisc 2ML )  was injected into the  anterior-lateral joint space compartment without difficulty. he tolerated the procedure well without difficulty. A band-aid was applied.

## 2023-05-17 ENCOUNTER — OFFICE VISIT (OUTPATIENT)
Dept: ORTHOPEDIC SURGERY | Age: 73
End: 2023-05-17
Payer: MEDICARE

## 2023-05-17 DIAGNOSIS — M17.0 PRIMARY OSTEOARTHRITIS OF BOTH KNEES: Primary | ICD-10-CM

## 2023-05-17 PROCEDURE — 99999 PR OFFICE/OUTPT VISIT,PROCEDURE ONLY: CPT | Performed by: PHYSICIAN ASSISTANT

## 2023-05-17 PROCEDURE — 20610 DRAIN/INJ JOINT/BURSA W/O US: CPT | Performed by: PHYSICIAN ASSISTANT

## 2023-05-17 NOTE — PROGRESS NOTES
Subjective:    He  is here for visco supplementation injection # 3 for the right and left knee. Objective: There were no vitals taken for this visit. Examination of the right and left knee: Inspection of the skin reveals no unusual erythema. There is mild intra-articular effusion. There is mild pain associated with ROM testing. Extensor Mechanism is intact. Diagnosis:    ICD-10-CM    1. Primary osteoarthritis of both knees  M17.0 [place] ARTHROCENTESIS ASPIR&/INJ MAJOR JT/BURSA W/O US     hyaluronan (ORTHOVISC) 30 MG/2ML injection 30 mg     hyaluronan (ORTHOVISC) 30 MG/2ML injection 30 mg          Assessment/ Plan:  Knee pain related to knee arthritis. Discussed at length conservative treatment of knee symptoms/arthritis, according to AAOS Clinical Practice Guidelines:  Evidence for intra-articular hyaluronic acid injections (viscosupplementation) is not as strong, but may benefit a subset of patients who have failed other options. Informed patient viscosupplementation can be performed every 6 months as needed. Risks and benefits of viscosupplementation injections were also discussed today. Risks include but not limited to increased pain, bleeding, infection, failure to provide improvement, neurovascular injury. He had ample time for discussion and questions were answered to his satisfaction. He verbally consented to proceed with intra-articular injection today. Proceed with injection # 3 in the series of 3 injections for the  right and left knee. PROCEDURE NOTE:  PRE-PROCEDURE DIAGNOSIS: DJD knee  POST-PROCEDURE DIAGNOSIS: DJD knee  With [facility] permission, his  right and left knee was prepped in standard sterile fashion with  Alcohol. The prefilled injection of the visco supplementation ( OrthoVisc 2ML )  was injected into the  anterior-lateral joint space compartment without difficulty. he tolerated the procedure well without difficulty. A band-aid was applied.

## 2023-07-13 ENCOUNTER — OFFICE VISIT (OUTPATIENT)
Dept: ORTHOPEDIC SURGERY | Age: 73
End: 2023-07-13

## 2023-07-13 VITALS — BODY MASS INDEX: 32.73 KG/M2 | WEIGHT: 255 LBS | HEIGHT: 74 IN

## 2023-07-13 DIAGNOSIS — M17.11 PRIMARY OSTEOARTHRITIS OF RIGHT KNEE: ICD-10-CM

## 2023-07-13 DIAGNOSIS — M17.0 PRIMARY OSTEOARTHRITIS OF BOTH KNEES: Primary | ICD-10-CM

## 2023-07-13 DIAGNOSIS — G56.01 CARPAL TUNNEL SYNDROME OF RIGHT WRIST: ICD-10-CM

## 2023-07-13 RX ORDER — TRIAMCINOLONE ACETONIDE 40 MG/ML
40 INJECTION, SUSPENSION INTRA-ARTICULAR; INTRAMUSCULAR ONCE
Status: COMPLETED | OUTPATIENT
Start: 2023-07-13 | End: 2023-07-13

## 2023-07-13 RX ORDER — BUPIVACAINE HYDROCHLORIDE 2.5 MG/ML
2 INJECTION, SOLUTION INFILTRATION; PERINEURAL ONCE
Status: COMPLETED | OUTPATIENT
Start: 2023-07-13 | End: 2023-07-13

## 2023-07-13 RX ADMIN — BUPIVACAINE HYDROCHLORIDE 5 MG: 2.5 INJECTION, SOLUTION INFILTRATION; PERINEURAL at 11:13

## 2023-07-13 RX ADMIN — TRIAMCINOLONE ACETONIDE 40 MG: 40 INJECTION, SUSPENSION INTRA-ARTICULAR; INTRAMUSCULAR at 11:14

## 2023-07-13 RX ADMIN — BUPIVACAINE HYDROCHLORIDE 5 MG: 2.5 INJECTION, SOLUTION INFILTRATION; PERINEURAL at 11:14

## 2023-07-13 NOTE — PROGRESS NOTES
[address] [facility]  Office Visit    Chief Complaint: Bilateral knee pain; right hand numbness and tingling    HPI:  Robbi Rousseau is a 67 y.o. who is here in follow-up of bilateral knee pain due to osteoarthritis. He underwent steroid injections in January 2023 and Orthovisc injections in May 2023. He reports the steroid injections were helpful. However, he had no significant relief of pain in his right knee with the recent Orthovisc injections. He denies a history of injury or surgery in the right knee. He does report right knee pain on a daily basis. The patient has difficulty climbing stairs, difficulty getting out of a chair, difficulty with activities of daily living including hygiene and pain at night. Pain level at rest is 7 and with activities 9-10/10. He denies diabetes, tobacco use, history of blood clots, blood thinners. He has help at home. He also notes right thumb, index finger, middle finger numbness and tingling. This is near constant. He does wear a brace at nighttime. He has a history of left carpal tunnel release. He is left-hand dominant. He denies neck pain. Exam:  Appearance: sitting in exam room chair, appears to be in no acute distress, awake and alert  Resp: unlabored breathing on room air  Skin: warm, dry and intact with out erythema or significant increased temperature  Neuro: grossly intact both lower extremities. Intact sensation to light touch. Motor exam 4+ to 5/5 in all major motor groups. Bilateral knees: Examination reveals that knee range of motion is 0 to 130 degrees. There is varus deformity, positive crepitus, positive joint line tenderness, positive antalgic gait. Neurologically, plantar flexion and dorsiflexion is intact. 5/5 strength. RUE: Positive Tinel and Durkan wrist.  He reports objectively diminished sensation in the median nerve distribution. Sensation intact light touch in the radial and ulnar nerve distributions.

## 2023-07-17 ENCOUNTER — TELEPHONE (OUTPATIENT)
Dept: ORTHOPEDIC SURGERY | Age: 73
End: 2023-07-17

## 2023-07-17 NOTE — TELEPHONE ENCOUNTER
Yonatan GARCIA IS REQUESTING A CALL BACK REGARDING A PRIOR AUTH FOR  CT OF LOWER EXTREMITY.  [facility] [phone] CASE# [id number]

## 2023-08-11 ENCOUNTER — TELEPHONE (OUTPATIENT)
Dept: ORTHOPEDIC SURGERY | Age: 73
End: 2023-08-11

## 2023-08-11 NOTE — TELEPHONE ENCOUNTER
Pt scheduled for [facility] on 9-26 and having a R TKA on 11-7.    Is it ok to proceed with this, he would like a call back at [phone]

## 2023-10-03 ENCOUNTER — HOSPITAL ENCOUNTER (OUTPATIENT)
Dept: CT IMAGING | Age: 73
Discharge: HOME | End: 2023-10-03
Attending: ORTHOPAEDIC SURGERY
Payer: MEDICARE

## 2023-10-03 DIAGNOSIS — M17.11 PRIMARY OSTEOARTHRITIS OF RIGHT KNEE: ICD-10-CM

## 2023-10-03 PROCEDURE — 73700 CT LOWER EXTREMITY W/O DYE: CPT

## 2023-10-30 ENCOUNTER — HOSPITAL ENCOUNTER (OUTPATIENT)
Dept: PREADMISSION TESTING | Age: 73
Discharge: HOME | End: 2023-11-03
Payer: MEDICARE

## 2023-10-30 DIAGNOSIS — M17.11 PRIMARY OSTEOARTHRITIS OF RIGHT KNEE: ICD-10-CM

## 2023-10-30 LAB
ABO + RH BLD: NORMAL
ALBUMIN SERPL-MCNC: 4.4 G/DL (ref 3.4–5)
ANION GAP SERPL CALCULATED.3IONS-SCNC: 12 MMOL/L (ref 3–16)
APTT BLD: 24.2 SEC (ref 22.7–35.9)
BACTERIA URNS QL MICRO: NORMAL /HPF
BASOPHILS # BLD: 0 K/UL (ref 0–0.2)
BASOPHILS NFR BLD: 0.5 %
BILIRUB UR QL STRIP.AUTO: NEGATIVE
BLD GP AB SCN SERPL QL: NORMAL
BUN SERPL-MCNC: 16 MG/DL (ref 7–20)
CALCIUM SERPL-MCNC: 9.8 MG/DL (ref 8.3–10.6)
CHLORIDE SERPL-SCNC: 103 MMOL/L (ref 99–110)
CLARITY UR: CLEAR
CO2 SERPL-SCNC: 24 MMOL/L (ref 21–32)
COLOR UR: YELLOW
CREAT SERPL-MCNC: 0.9 MG/DL (ref 0.8–1.3)
DEPRECATED RDW RBC AUTO: 13.7 % (ref 12.4–15.4)
EOSINOPHIL # BLD: 0.5 K/UL (ref 0–0.6)
EOSINOPHIL NFR BLD: 5.4 %
EPI CELLS #/AREA URNS AUTO: 1 /HPF (ref 0–5)
GFR SERPLBLD CREATININE-BSD FMLA CKD-EPI: >60 ML/MIN/{1.73_M2}
GLUCOSE SERPL-MCNC: 146 MG/DL (ref 70–99)
GLUCOSE UR STRIP.AUTO-MCNC: NEGATIVE MG/DL
HCT VFR BLD AUTO: 43.1 % (ref 40.5–52.5)
HGB BLD-MCNC: 14.8 G/DL (ref 13.5–17.5)
HGB UR QL STRIP.AUTO: NEGATIVE
HYALINE CASTS #/AREA URNS AUTO: 3 /LPF (ref 0–8)
INR PPP: 0.93 (ref 0.84–1.16)
KETONES UR STRIP.AUTO-MCNC: NEGATIVE MG/DL
LEUKOCYTE ESTERASE UR QL STRIP.AUTO: NEGATIVE
LYMPHOCYTES # BLD: 2.3 K/UL (ref 1–5.1)
LYMPHOCYTES NFR BLD: 25 %
MCH RBC QN AUTO: 32 PG (ref 26–34)
MCHC RBC AUTO-ENTMCNC: 34.4 G/DL (ref 31–36)
MCV RBC AUTO: 93.1 FL (ref 80–100)
MONOCYTES # BLD: 0.9 K/UL (ref 0–1.3)
MONOCYTES NFR BLD: 10.2 %
NEUTROPHILS # BLD: 5.4 K/UL (ref 1.7–7.7)
NEUTROPHILS NFR BLD: 58.9 %
NITRITE UR QL STRIP.AUTO: NEGATIVE
PH UR STRIP.AUTO: 5 [PH] (ref 5–8)
PLATELET # BLD AUTO: 217 K/UL (ref 135–450)
PMV BLD AUTO: 8.7 FL (ref 5–10.5)
POTASSIUM SERPL-SCNC: 4.3 MMOL/L (ref 3.5–5.1)
PROT UR STRIP.AUTO-MCNC: ABNORMAL MG/DL
PROTHROMBIN TIME: 12.5 SEC (ref 11.5–14.8)
RBC # BLD AUTO: 4.63 M/UL (ref 4.2–5.9)
RBC CLUMPS #/AREA URNS AUTO: 0 /HPF (ref 0–4)
SODIUM SERPL-SCNC: 139 MMOL/L (ref 136–145)
SP GR UR STRIP.AUTO: 1.02 (ref 1–1.03)
UA COMPLETE W REFLEX CULTURE PNL UR: ABNORMAL
UA DIPSTICK W REFLEX MICRO PNL UR: YES
URN SPEC COLLECT METH UR: ABNORMAL
UROBILINOGEN UR STRIP-ACNC: 1 E.U./DL
WBC # BLD AUTO: 9.1 K/UL (ref 4–11)
WBC #/AREA URNS AUTO: 0 /HPF (ref 0–5)

## 2023-10-30 PROCEDURE — 80048 BASIC METABOLIC PNL TOTAL CA: CPT

## 2023-10-30 PROCEDURE — 82040 ASSAY OF SERUM ALBUMIN: CPT

## 2023-10-30 PROCEDURE — 82306 VITAMIN D 25 HYDROXY: CPT

## 2023-10-30 PROCEDURE — 85610 PROTHROMBIN TIME: CPT

## 2023-10-30 PROCEDURE — 87641 MR-STAPH DNA AMP PROBE: CPT

## 2023-10-30 PROCEDURE — 85025 COMPLETE CBC W/AUTO DIFF WBC: CPT

## 2023-10-30 PROCEDURE — 86850 RBC ANTIBODY SCREEN: CPT

## 2023-10-30 PROCEDURE — 81001 URINALYSIS AUTO W/SCOPE: CPT

## 2023-10-30 PROCEDURE — 84134 ASSAY OF PREALBUMIN: CPT

## 2023-10-30 PROCEDURE — 86900 BLOOD TYPING SEROLOGIC ABO: CPT

## 2023-10-30 PROCEDURE — 86901 BLOOD TYPING SEROLOGIC RH(D): CPT

## 2023-10-30 PROCEDURE — 85730 THROMBOPLASTIN TIME PARTIAL: CPT

## 2023-10-30 PROCEDURE — 83036 HEMOGLOBIN GLYCOSYLATED A1C: CPT

## 2023-10-30 NOTE — PROGRESS NOTES
facility. MEDS TO BEDS FROM [employer] [facility]: Patient is agreeable to have medications filled via meds to beds program with [facility]. Facesheet with discharge needs provided to [address] / .

## 2023-10-31 LAB
25(OH)D3 SERPL-MCNC: 74 NG/ML
EST. AVERAGE GLUCOSE BLD GHB EST-MCNC: 151.3 MG/DL
HBA1C MFR BLD: 6.9 %
MRSA DNA SPEC QL NAA+PROBE: NORMAL
REASON FOR REJECTION: NORMAL
REJECTED TEST: NORMAL

## 2023-11-02 ENCOUNTER — OFFICE VISIT (OUTPATIENT)
Dept: ORTHOPEDIC SURGERY | Age: 73
End: 2023-11-02
Payer: MEDICARE

## 2023-11-02 VITALS — WEIGHT: 255 LBS | HEIGHT: 74 IN | BODY MASS INDEX: 32.73 KG/M2

## 2023-11-02 DIAGNOSIS — M17.11 PRIMARY OSTEOARTHRITIS OF RIGHT KNEE: Primary | ICD-10-CM

## 2023-11-02 LAB — PREALB SERPL-MCNC: 27.1 MG/DL (ref 20–40)

## 2023-11-02 PROCEDURE — 84134 ASSAY OF PREALBUMIN: CPT

## 2023-11-02 PROCEDURE — 1123F ACP DISCUSS/DSCN MKR DOCD: CPT | Performed by: ORTHOPAEDIC SURGERY

## 2023-11-02 PROCEDURE — G8427 DOCREV CUR MEDS BY ELIG CLIN: HCPCS | Performed by: ORTHOPAEDIC SURGERY

## 2023-11-02 PROCEDURE — G8417 CALC BMI ABV UP PARAM F/U: HCPCS | Performed by: ORTHOPAEDIC SURGERY

## 2023-11-02 PROCEDURE — 3017F COLORECTAL CA SCREEN DOC REV: CPT | Performed by: ORTHOPAEDIC SURGERY

## 2023-11-02 PROCEDURE — 99213 OFFICE O/P EST LOW 20 MIN: CPT | Performed by: ORTHOPAEDIC SURGERY

## 2023-11-02 PROCEDURE — 1036F TOBACCO NON-USER: CPT | Performed by: ORTHOPAEDIC SURGERY

## 2023-11-02 PROCEDURE — 36415 COLL VENOUS BLD VENIPUNCTURE: CPT

## 2023-11-02 PROCEDURE — G8484 FLU IMMUNIZE NO ADMIN: HCPCS | Performed by: ORTHOPAEDIC SURGERY

## 2023-11-02 NOTE — PROGRESS NOTES
[address] [facility]  Office Visit    Chief Complaint: Right knee pain    HPI:  Romana Hernandez is a 75 y. o. who is here in follow-up of right knee pain due to osteoarthritis. He is scheduled for right total knee arthroplasty next week. For review, he has a history of bilateral knee pain with osteoarthritis. He underwent steroid injections in January 2023 and Orthovisc injections in May 2023. He reports the steroid injections were helpful. However, he had no significant relief of pain in his right knee with the recent Orthovisc injections. He denies a history of injury or surgery in the right knee. He does report right knee pain on a daily basis. The patient has difficulty climbing stairs, difficulty getting out of a chair, difficulty with activities of daily living including hygiene and pain at night. Pain level at rest is 7 and with activities 9-10/10. He denies diabetes, tobacco use, history of blood clots, blood thinners. He has help at home. Exam:  Appearance: sitting in exam room chair, appears to be in no acute distress, awake and alert  Resp: unlabored breathing on room air  Skin: warm, dry and intact with out erythema or significant increased temperature  Neuro: grossly intact both lower extremities. Intact sensation to light touch. Motor exam 4+ to 5/5 in all major motor groups. Bilateral knees: Examination reveals that knee range of motion is 0 to 130 degrees. There is varus deformity, positive crepitus, positive joint line tenderness, positive antalgic gait. Neurologically, plantar flexion and dorsiflexion is intact. 5/5 strength. Imaging:  Prior right knee radiographs were reviewed. These are significant for tricompartmental degenerative changes with significant medial joint space narrowing and knee patellofemoral joint space narrowing. There are periarticular osteophytes.     Assessment:  Right knee osteoarthritis    Plan:  We discussed right total knee

## 2023-11-02 NOTE — DISCHARGE INSTR - COC
[facility]) Continuity of Care Form    Patient Name:  Romana Hernandez  : 1950    MRN:  [id number]    Admit date:  (Not on file)  Discharge date:  2023    Code Status Order: Prior  Advance Directives: Yes    Admitting Physician: Amrita Roberts MD  PCP: Vu Hall MD    Discharging Nurse: [facility] Unit/Room#: No information available for this encounter. Discharging Unit Phone Number: [phone]    Emergency Contact:        Past Surgical History:  Past Surgical History:   Procedure Laterality Date    ANKLE SURGERY  30 years ago    left    BACK SURGERY  30 years ago    BACK SURGERY  10/12/2017    removal of agustin hardware T11- L3,4, spinal fusion L2-L5    KNEE SURGERY  5 years ago    right    SHOULDER SURGERY Left 2021    LEFT REVERSE TOTAL SHOULDER REPLACEMENT performed by Amrita Roberts MD at [address] [place] [address] Left 11/3/2020    LEFT ANTERIOR TOTAL HIP REPLACEMENT WITH C-ARM performed by Marian Johnson MD at [facility]       Immunization History:   Immunization History   Administered Date(s) Administered    COVID-19, PFIZER Bivalent, DO NOT Dilute, (age 12y+), IM, 30 mcg/0.3 mL 01/10/2023    COVID-19, PFIZER GRAY top, DO NOT Dilute, (age 15 y+), IM, 30 mcg/0.3 mL 2022    COVID-19, PFIZER PURPLE top, DILUTE for use, (age 15 y+), 30mcg/0.3mL 2021, 2021, 2021    Influenza, FLUCELVAX, (age 10 mo+), MDCK, PF, 0.5mL 10/15/2017    Pneumococcal, PCV-13, PREVNAR 13, (age 6w+), IM, 0.5mL 10/15/2017    Pneumococcal, PPSV23, PNEUMOVAX 23, (age 2y+), SC/IM, 0.5mL 10/05/2015    TDaP, ADACEL (age 6y-58y), BOOSTRIX (age 10y+), IM, 0.5mL 2008       Active Problems:  Active Problems:    * No active hospital problems. *  Resolved Problems:    * No resolved hospital problems. *      Isolation/Infection:       Nurse Assessment:  Last Vital Signs: There were no vitals taken for this visit.   Last documented pain score (0-10 scale):    Last Weight:

## 2023-11-06 ENCOUNTER — ANESTHESIA EVENT (OUTPATIENT)
Dept: OPERATING ROOM | Age: 73
End: 2023-11-06
Payer: MEDICARE

## 2023-11-07 ENCOUNTER — HOSPITAL ENCOUNTER (OUTPATIENT)
Age: 73
Setting detail: OBSERVATION
Discharge: HOME | End: 2023-11-08
Attending: ORTHOPAEDIC SURGERY | Admitting: ORTHOPAEDIC SURGERY
Payer: MEDICARE

## 2023-11-07 ENCOUNTER — ANESTHESIA (OUTPATIENT)
Dept: OPERATING ROOM | Age: 73
End: 2023-11-07
Payer: MEDICARE

## 2023-11-07 ENCOUNTER — APPOINTMENT (OUTPATIENT)
Dept: GENERAL RADIOLOGY | Age: 73
End: 2023-11-07
Attending: ORTHOPAEDIC SURGERY
Payer: MEDICARE

## 2023-11-07 DIAGNOSIS — M17.11 ARTHRITIS OF RIGHT KNEE: Primary | ICD-10-CM

## 2023-11-07 LAB
ABO + RH BLD: NORMAL
BLD GP AB SCN SERPL QL: NORMAL
GLUCOSE BLD-MCNC: 140 MG/DL (ref 70–99)
GLUCOSE BLD-MCNC: 146 MG/DL (ref 70–99)
GLUCOSE BLD-MCNC: 184 MG/DL (ref 70–99)
GLUCOSE BLD-MCNC: 186 MG/DL (ref 70–99)
PERFORMED ON: ABNORMAL

## 2023-11-07 PROCEDURE — 3600000005 HC SURGERY LEVEL 5 BASE: Performed by: ORTHOPAEDIC SURGERY

## 2023-11-07 PROCEDURE — 2700000000 HC OXYGEN THERAPY PER DAY

## 2023-11-07 PROCEDURE — 6360000002 HC RX W HCPCS: Performed by: NURSE ANESTHETIST, CERTIFIED REGISTERED

## 2023-11-07 PROCEDURE — 6360000002 HC RX W HCPCS: Performed by: ORTHOPAEDIC SURGERY

## 2023-11-07 PROCEDURE — 97535 SELF CARE MNGMENT TRAINING: CPT

## 2023-11-07 PROCEDURE — 3700000001 HC ADD 15 MINUTES (ANESTHESIA): Performed by: ORTHOPAEDIC SURGERY

## 2023-11-07 PROCEDURE — 7100000001 HC PACU RECOVERY - ADDTL 15 MIN: Performed by: ORTHOPAEDIC SURGERY

## 2023-11-07 PROCEDURE — 97162 PT EVAL MOD COMPLEX 30 MIN: CPT

## 2023-11-07 PROCEDURE — C1776 JOINT DEVICE (IMPLANTABLE): HCPCS | Performed by: ORTHOPAEDIC SURGERY

## 2023-11-07 PROCEDURE — 2580000003 HC RX 258: Performed by: ORTHOPAEDIC SURGERY

## 2023-11-07 PROCEDURE — 2580000003 HC RX 258: Performed by: ANESTHESIOLOGY

## 2023-11-07 PROCEDURE — 6370000000 HC RX 637 (ALT 250 FOR IP): Performed by: ORTHOPAEDIC SURGERY

## 2023-11-07 PROCEDURE — G0378 HOSPITAL OBSERVATION PER HR: HCPCS

## 2023-11-07 PROCEDURE — 3700000000 HC ANESTHESIA ATTENDED CARE: Performed by: ORTHOPAEDIC SURGERY

## 2023-11-07 PROCEDURE — 73560 X-RAY EXAM OF KNEE 1 OR 2: CPT

## 2023-11-07 PROCEDURE — 6360000002 HC RX W HCPCS: Performed by: ANESTHESIOLOGY

## 2023-11-07 PROCEDURE — 97530 THERAPEUTIC ACTIVITIES: CPT

## 2023-11-07 PROCEDURE — C9290 INJ, BUPIVACAINE LIPOSOME: HCPCS | Performed by: ORTHOPAEDIC SURGERY

## 2023-11-07 PROCEDURE — 97116 GAIT TRAINING THERAPY: CPT

## 2023-11-07 PROCEDURE — 86900 BLOOD TYPING SEROLOGIC ABO: CPT

## 2023-11-07 PROCEDURE — 3600000015 HC SURGERY LEVEL 5 ADDTL 15MIN: Performed by: ORTHOPAEDIC SURGERY

## 2023-11-07 PROCEDURE — 64447 NJX AA&/STRD FEMORAL NRV IMG: CPT | Performed by: ANESTHESIOLOGY

## 2023-11-07 PROCEDURE — 94761 N-INVAS EAR/PLS OXIMETRY MLT: CPT

## 2023-11-07 PROCEDURE — A4217 STERILE WATER/SALINE, 500 ML: HCPCS | Performed by: ORTHOPAEDIC SURGERY

## 2023-11-07 PROCEDURE — 2720000010 HC SURG SUPPLY STERILE: Performed by: ORTHOPAEDIC SURGERY

## 2023-11-07 PROCEDURE — 86850 RBC ANTIBODY SCREEN: CPT

## 2023-11-07 PROCEDURE — 2500000003 HC RX 250 WO HCPCS: Performed by: NURSE ANESTHETIST, CERTIFIED REGISTERED

## 2023-11-07 PROCEDURE — 97166 OT EVAL MOD COMPLEX 45 MIN: CPT

## 2023-11-07 PROCEDURE — 86901 BLOOD TYPING SEROLOGIC RH(D): CPT

## 2023-11-07 PROCEDURE — 7100000000 HC PACU RECOVERY - FIRST 15 MIN: Performed by: ORTHOPAEDIC SURGERY

## 2023-11-07 PROCEDURE — 2709999900 HC NON-CHARGEABLE SUPPLY: Performed by: ORTHOPAEDIC SURGERY

## 2023-11-07 DEVICE — TIBIAL COMPONENT
Type: IMPLANTABLE DEVICE | Site: KNEE | Status: FUNCTIONAL
Brand: TRIATHLON

## 2023-11-07 DEVICE — PATELLA
Type: IMPLANTABLE DEVICE | Site: KNEE | Status: FUNCTIONAL
Brand: TRIATHLON

## 2023-11-07 DEVICE — TIBIAL BEARING INSERT - CS
Type: IMPLANTABLE DEVICE | Site: KNEE | Status: FUNCTIONAL
Brand: TRIATHLON

## 2023-11-07 DEVICE — CRUCIATE RETAINING FEMORAL
Type: IMPLANTABLE DEVICE | Site: KNEE | Status: FUNCTIONAL
Brand: TRIATHLON

## 2023-11-07 RX ORDER — PROPOFOL 10 MG/ML
INJECTION, EMULSION INTRAVENOUS PRN
Status: DISCONTINUED | OUTPATIENT
Start: 2023-11-07 | End: 2023-11-07 | Stop reason: SDUPTHER

## 2023-11-07 RX ORDER — SODIUM CHLORIDE 0.9 % (FLUSH) 0.9 %
5-40 SYRINGE (ML) INJECTION PRN
Status: DISCONTINUED | OUTPATIENT
Start: 2023-11-07 | End: 2023-11-07 | Stop reason: HOSPADM

## 2023-11-07 RX ORDER — OXYCODONE HYDROCHLORIDE 10 MG/1
10 TABLET ORAL ONCE
Status: COMPLETED | OUTPATIENT
Start: 2023-11-07 | End: 2023-11-07

## 2023-11-07 RX ORDER — ONDANSETRON 2 MG/ML
INJECTION INTRAMUSCULAR; INTRAVENOUS PRN
Status: DISCONTINUED | OUTPATIENT
Start: 2023-11-07 | End: 2023-11-07 | Stop reason: SDUPTHER

## 2023-11-07 RX ORDER — ASPIRIN 81 MG/1
81 TABLET ORAL 2 TIMES DAILY
Qty: 60 TABLET | Refills: 0 | Status: SHIPPED | OUTPATIENT
Start: 2023-11-07 | End: 2023-12-07

## 2023-11-07 RX ORDER — FENTANYL CITRATE 0.05 MG/ML
25 INJECTION, SOLUTION INTRAMUSCULAR; INTRAVENOUS EVERY 5 MIN PRN
Status: COMPLETED | OUTPATIENT
Start: 2023-11-07 | End: 2023-11-07

## 2023-11-07 RX ORDER — KETOROLAC TROMETHAMINE 15 MG/ML
15 INJECTION, SOLUTION INTRAMUSCULAR; INTRAVENOUS
Status: DISCONTINUED | OUTPATIENT
Start: 2023-11-07 | End: 2023-11-08 | Stop reason: HOSPADM

## 2023-11-07 RX ORDER — SODIUM CHLORIDE 0.9 % (FLUSH) 0.9 %
5-40 SYRINGE (ML) INJECTION EVERY 12 HOURS SCHEDULED
Status: DISCONTINUED | OUTPATIENT
Start: 2023-11-07 | End: 2023-11-07 | Stop reason: HOSPADM

## 2023-11-07 RX ORDER — CEFUROXIME AXETIL 250 MG/1
250 TABLET ORAL 2 TIMES DAILY
Qty: 14 TABLET | Refills: 0 | Status: SHIPPED | OUTPATIENT
Start: 2023-11-07 | End: 2023-11-14

## 2023-11-07 RX ORDER — SODIUM CHLORIDE 9 MG/ML
INJECTION, SOLUTION INTRAVENOUS PRN
Status: DISCONTINUED | OUTPATIENT
Start: 2023-11-07 | End: 2023-11-08 | Stop reason: HOSPADM

## 2023-11-07 RX ORDER — MORPHINE SULFATE 2 MG/ML
2 INJECTION, SOLUTION INTRAMUSCULAR; INTRAVENOUS
Status: DISCONTINUED | OUTPATIENT
Start: 2023-11-07 | End: 2023-11-08 | Stop reason: HOSPADM

## 2023-11-07 RX ORDER — INSULIN LISPRO 100 [IU]/ML
0-4 INJECTION, SOLUTION INTRAVENOUS; SUBCUTANEOUS NIGHTLY
Status: DISCONTINUED | OUTPATIENT
Start: 2023-11-07 | End: 2023-11-08 | Stop reason: HOSPADM

## 2023-11-07 RX ORDER — SODIUM CHLORIDE 0.9 % (FLUSH) 0.9 %
5-40 SYRINGE (ML) INJECTION PRN
Status: DISCONTINUED | OUTPATIENT
Start: 2023-11-07 | End: 2023-11-08 | Stop reason: HOSPADM

## 2023-11-07 RX ORDER — ACETAMINOPHEN 325 MG/1
650 TABLET ORAL EVERY 6 HOURS
Qty: 120 TABLET | Refills: 3
Start: 2023-11-07

## 2023-11-07 RX ORDER — METHOCARBAMOL 100 MG/ML
INJECTION, SOLUTION INTRAMUSCULAR; INTRAVENOUS PRN
Status: DISCONTINUED | OUTPATIENT
Start: 2023-11-07 | End: 2023-11-07 | Stop reason: SDUPTHER

## 2023-11-07 RX ORDER — INSULIN GLARGINE 100 [IU]/ML
0.25 INJECTION, SOLUTION SUBCUTANEOUS NIGHTLY
Status: DISCONTINUED | OUTPATIENT
Start: 2023-11-07 | End: 2023-11-08 | Stop reason: HOSPADM

## 2023-11-07 RX ORDER — MELOXICAM 7.5 MG/1
7.5 TABLET ORAL DAILY
Status: DISCONTINUED | OUTPATIENT
Start: 2023-11-08 | End: 2023-11-08 | Stop reason: HOSPADM

## 2023-11-07 RX ORDER — OXYCODONE HYDROCHLORIDE 5 MG/1
5 TABLET ORAL EVERY 4 HOURS PRN
Status: DISCONTINUED | OUTPATIENT
Start: 2023-11-07 | End: 2023-11-08 | Stop reason: HOSPADM

## 2023-11-07 RX ORDER — MELOXICAM 7.5 MG/1
7.5 TABLET ORAL DAILY
Qty: 5 TABLET | Refills: 0 | Status: SHIPPED | OUTPATIENT
Start: 2023-11-08 | End: 2023-11-13

## 2023-11-07 RX ORDER — DEXAMETHASONE SODIUM PHOSPHATE 4 MG/ML
INJECTION, SOLUTION INTRA-ARTICULAR; INTRALESIONAL; INTRAMUSCULAR; INTRAVENOUS; SOFT TISSUE PRN
Status: DISCONTINUED | OUTPATIENT
Start: 2023-11-07 | End: 2023-11-07 | Stop reason: SDUPTHER

## 2023-11-07 RX ORDER — DEXTROSE MONOHYDRATE 100 MG/ML
INJECTION, SOLUTION INTRAVENOUS CONTINUOUS PRN
Status: DISCONTINUED | OUTPATIENT
Start: 2023-11-07 | End: 2023-11-08 | Stop reason: HOSPADM

## 2023-11-07 RX ORDER — SODIUM CHLORIDE 0.9 % (FLUSH) 0.9 %
5-40 SYRINGE (ML) INJECTION EVERY 12 HOURS SCHEDULED
Status: DISCONTINUED | OUTPATIENT
Start: 2023-11-07 | End: 2023-11-08 | Stop reason: HOSPADM

## 2023-11-07 RX ORDER — ROPIVACAINE HYDROCHLORIDE 5 MG/ML
INJECTION, SOLUTION EPIDURAL; INFILTRATION; PERINEURAL
Status: COMPLETED | OUTPATIENT
Start: 2023-11-07 | End: 2023-11-07

## 2023-11-07 RX ORDER — MELOXICAM 7.5 MG/1
7.5 TABLET ORAL ONCE
Status: COMPLETED | OUTPATIENT
Start: 2023-11-07 | End: 2023-11-07

## 2023-11-07 RX ORDER — EPHEDRINE SULFATE/0.9% NACL/PF 50 MG/5 ML
SYRINGE (ML) INTRAVENOUS PRN
Status: DISCONTINUED | OUTPATIENT
Start: 2023-11-07 | End: 2023-11-07 | Stop reason: SDUPTHER

## 2023-11-07 RX ORDER — ROSUVASTATIN CALCIUM 40 MG/1
40 TABLET, COATED ORAL DAILY
Status: DISCONTINUED | OUTPATIENT
Start: 2023-11-08 | End: 2023-11-08 | Stop reason: HOSPADM

## 2023-11-07 RX ORDER — LISINOPRIL 20 MG/1
20 TABLET ORAL NIGHTLY
Status: DISCONTINUED | OUTPATIENT
Start: 2023-11-07 | End: 2023-11-08 | Stop reason: HOSPADM

## 2023-11-07 RX ORDER — OXYCODONE HYDROCHLORIDE 10 MG/1
10 TABLET ORAL EVERY 4 HOURS PRN
Status: DISCONTINUED | OUTPATIENT
Start: 2023-11-07 | End: 2023-11-08 | Stop reason: HOSPADM

## 2023-11-07 RX ORDER — ASPIRIN 81 MG/1
81 TABLET ORAL 2 TIMES DAILY
Status: DISCONTINUED | OUTPATIENT
Start: 2023-11-07 | End: 2023-11-08 | Stop reason: HOSPADM

## 2023-11-07 RX ORDER — OXYCODONE HYDROCHLORIDE 5 MG/1
5-10 TABLET ORAL EVERY 6 HOURS PRN
Qty: 40 TABLET | Refills: 0 | Status: SHIPPED | OUTPATIENT
Start: 2023-11-07 | End: 2023-11-12

## 2023-11-07 RX ORDER — PREGABALIN 75 MG/1
75 CAPSULE ORAL 2 TIMES DAILY
Status: DISCONTINUED | OUTPATIENT
Start: 2023-11-07 | End: 2023-11-08 | Stop reason: HOSPADM

## 2023-11-07 RX ORDER — ONDANSETRON 4 MG/1
4 TABLET, ORALLY DISINTEGRATING ORAL EVERY 8 HOURS PRN
Status: DISCONTINUED | OUTPATIENT
Start: 2023-11-07 | End: 2023-11-08 | Stop reason: HOSPADM

## 2023-11-07 RX ORDER — INSULIN LISPRO 100 [IU]/ML
0.08 INJECTION, SOLUTION INTRAVENOUS; SUBCUTANEOUS
Status: DISCONTINUED | OUTPATIENT
Start: 2023-11-07 | End: 2023-11-08 | Stop reason: HOSPADM

## 2023-11-07 RX ORDER — ONDANSETRON 2 MG/ML
4 INJECTION INTRAMUSCULAR; INTRAVENOUS EVERY 6 HOURS PRN
Status: DISCONTINUED | OUTPATIENT
Start: 2023-11-07 | End: 2023-11-08 | Stop reason: HOSPADM

## 2023-11-07 RX ORDER — MORPHINE SULFATE 4 MG/ML
4 INJECTION, SOLUTION INTRAMUSCULAR; INTRAVENOUS
Status: DISCONTINUED | OUTPATIENT
Start: 2023-11-07 | End: 2023-11-08 | Stop reason: HOSPADM

## 2023-11-07 RX ORDER — FENTANYL CITRATE 50 UG/ML
INJECTION, SOLUTION INTRAMUSCULAR; INTRAVENOUS PRN
Status: DISCONTINUED | OUTPATIENT
Start: 2023-11-07 | End: 2023-11-07 | Stop reason: SDUPTHER

## 2023-11-07 RX ORDER — LIDOCAINE HYDROCHLORIDE 20 MG/ML
INJECTION, SOLUTION EPIDURAL; INFILTRATION; INTRACAUDAL; PERINEURAL PRN
Status: DISCONTINUED | OUTPATIENT
Start: 2023-11-07 | End: 2023-11-07 | Stop reason: SDUPTHER

## 2023-11-07 RX ORDER — ACETAMINOPHEN 325 MG/1
650 TABLET ORAL EVERY 6 HOURS
Status: DISCONTINUED | OUTPATIENT
Start: 2023-11-07 | End: 2023-11-08 | Stop reason: HOSPADM

## 2023-11-07 RX ORDER — SODIUM CHLORIDE 450 MG/100ML
INJECTION, SOLUTION INTRAVENOUS CONTINUOUS
Status: DISCONTINUED | OUTPATIENT
Start: 2023-11-07 | End: 2023-11-08

## 2023-11-07 RX ORDER — INSULIN LISPRO 100 [IU]/ML
0-4 INJECTION, SOLUTION INTRAVENOUS; SUBCUTANEOUS
Status: DISCONTINUED | OUTPATIENT
Start: 2023-11-07 | End: 2023-11-08 | Stop reason: HOSPADM

## 2023-11-07 RX ORDER — PREGABALIN 75 MG/1
75 CAPSULE ORAL 2 TIMES DAILY
Qty: 28 CAPSULE | Refills: 0 | Status: SHIPPED | OUTPATIENT
Start: 2023-11-07 | End: 2023-11-21

## 2023-11-07 RX ORDER — SODIUM CHLORIDE 9 MG/ML
INJECTION, SOLUTION INTRAVENOUS PRN
Status: DISCONTINUED | OUTPATIENT
Start: 2023-11-07 | End: 2023-11-07 | Stop reason: HOSPADM

## 2023-11-07 RX ORDER — TRANEXAMIC ACID 650 MG/1
1950 TABLET ORAL ONCE
Status: COMPLETED | OUTPATIENT
Start: 2023-11-07 | End: 2023-11-07

## 2023-11-07 RX ORDER — CALCIUM CARBONATE 500 MG/1
500 TABLET, CHEWABLE ORAL 3 TIMES DAILY PRN
Status: DISCONTINUED | OUTPATIENT
Start: 2023-11-07 | End: 2023-11-08 | Stop reason: HOSPADM

## 2023-11-07 RX ORDER — ONDANSETRON 2 MG/ML
4 INJECTION INTRAMUSCULAR; INTRAVENOUS
Status: DISCONTINUED | OUTPATIENT
Start: 2023-11-07 | End: 2023-11-07 | Stop reason: HOSPADM

## 2023-11-07 RX ADMIN — FENTANYL CITRATE 50 MCG: 50 INJECTION INTRAMUSCULAR; INTRAVENOUS at 10:40

## 2023-11-07 RX ADMIN — MELOXICAM 7.5 MG: 7.5 TABLET ORAL at 09:16

## 2023-11-07 RX ADMIN — FENTANYL CITRATE 25 MCG: 0.05 INJECTION, SOLUTION INTRAMUSCULAR; INTRAVENOUS at 12:51

## 2023-11-07 RX ADMIN — TRANEXAMIC ACID 1950 MG: 650 TABLET, FILM COATED ORAL at 09:16

## 2023-11-07 RX ADMIN — INSULIN LISPRO 9 UNITS: 100 INJECTION, SOLUTION INTRAVENOUS; SUBCUTANEOUS at 16:31

## 2023-11-07 RX ADMIN — METHOCARBAMOL 250 MG: 100 INJECTION INTRAMUSCULAR; INTRAVENOUS at 11:58

## 2023-11-07 RX ADMIN — ROPIVACAINE HYDROCHLORIDE 20 ML: 5 INJECTION, SOLUTION EPIDURAL; INFILTRATION; PERINEURAL at 10:43

## 2023-11-07 RX ADMIN — DEXAMETHASONE SODIUM PHOSPHATE 4 MG: 4 INJECTION, SOLUTION INTRAMUSCULAR; INTRAVENOUS at 10:51

## 2023-11-07 RX ADMIN — SODIUM CHLORIDE: 9 INJECTION, SOLUTION INTRAVENOUS at 10:44

## 2023-11-07 RX ADMIN — FENTANYL CITRATE 50 MCG: 50 INJECTION INTRAMUSCULAR; INTRAVENOUS at 11:29

## 2023-11-07 RX ADMIN — Medication 10 ML: at 21:04

## 2023-11-07 RX ADMIN — HYDROMORPHONE HYDROCHLORIDE 1 MG: 1 INJECTION, SOLUTION INTRAMUSCULAR; INTRAVENOUS; SUBCUTANEOUS at 11:34

## 2023-11-07 RX ADMIN — OXYCODONE HYDROCHLORIDE 10 MG: 10 TABLET ORAL at 22:24

## 2023-11-07 RX ADMIN — FENTANYL CITRATE 25 MCG: 0.05 INJECTION, SOLUTION INTRAMUSCULAR; INTRAVENOUS at 12:56

## 2023-11-07 RX ADMIN — PREGABALIN 75 MG: 75 CAPSULE ORAL at 21:04

## 2023-11-07 RX ADMIN — METHOCARBAMOL 250 MG: 100 INJECTION INTRAMUSCULAR; INTRAVENOUS at 11:55

## 2023-11-07 RX ADMIN — METHOCARBAMOL 250 MG: 100 INJECTION INTRAMUSCULAR; INTRAVENOUS at 11:51

## 2023-11-07 RX ADMIN — OXYCODONE HYDROCHLORIDE 10 MG: 10 TABLET ORAL at 13:55

## 2023-11-07 RX ADMIN — INSULIN GLARGINE 29 UNITS: 100 INJECTION, SOLUTION SUBCUTANEOUS at 21:06

## 2023-11-07 RX ADMIN — ONDANSETRON 4 MG: 2 INJECTION INTRAMUSCULAR; INTRAVENOUS at 11:51

## 2023-11-07 RX ADMIN — SODIUM CHLORIDE 2000 MG: 900 INJECTION INTRAVENOUS at 21:10

## 2023-11-07 RX ADMIN — LIDOCAINE HYDROCHLORIDE 100 MG: 20 INJECTION, SOLUTION EPIDURAL; INFILTRATION; INTRACAUDAL; PERINEURAL at 10:48

## 2023-11-07 RX ADMIN — SODIUM CHLORIDE: 4.5 INJECTION, SOLUTION INTRAVENOUS at 14:01

## 2023-11-07 RX ADMIN — SODIUM CHLORIDE: 9 INJECTION, SOLUTION INTRAVENOUS at 11:49

## 2023-11-07 RX ADMIN — Medication 10 MG: at 11:01

## 2023-11-07 RX ADMIN — PROPOFOL 180 MG: 10 INJECTION, EMULSION INTRAVENOUS at 10:48

## 2023-11-07 RX ADMIN — HYDROMORPHONE HYDROCHLORIDE 0.5 MG: 1 INJECTION, SOLUTION INTRAMUSCULAR; INTRAVENOUS; SUBCUTANEOUS at 13:01

## 2023-11-07 RX ADMIN — ACETAMINOPHEN 650 MG: 325 TABLET ORAL at 17:58

## 2023-11-07 RX ADMIN — LISINOPRIL 20 MG: 20 TABLET ORAL at 21:04

## 2023-11-07 RX ADMIN — OXYCODONE HYDROCHLORIDE 10 MG: 10 TABLET ORAL at 17:58

## 2023-11-07 RX ADMIN — METHOCARBAMOL 250 MG: 100 INJECTION INTRAMUSCULAR; INTRAVENOUS at 11:59

## 2023-11-07 RX ADMIN — OXYCODONE HYDROCHLORIDE 10 MG: 10 TABLET ORAL at 09:16

## 2023-11-07 RX ADMIN — CEFAZOLIN 2000 MG: 2 INJECTION, POWDER, FOR SOLUTION INTRAMUSCULAR; INTRAVENOUS at 10:52

## 2023-11-07 RX ADMIN — ASPIRIN 81 MG: 81 TABLET, COATED ORAL at 21:04

## 2023-11-07 RX ADMIN — SODIUM CHLORIDE: 9 INJECTION, SOLUTION INTRAVENOUS at 12:36

## 2023-11-07 RX ADMIN — FENTANYL CITRATE 25 MCG: 0.05 INJECTION, SOLUTION INTRAMUSCULAR; INTRAVENOUS at 12:45

## 2023-11-07 RX ADMIN — FENTANYL CITRATE 25 MCG: 0.05 INJECTION, SOLUTION INTRAMUSCULAR; INTRAVENOUS at 12:35

## 2023-11-07 ASSESSMENT — PAIN - FUNCTIONAL ASSESSMENT
PAIN_FUNCTIONAL_ASSESSMENT: ACTIVITIES ARE NOT PREVENTED
PAIN_FUNCTIONAL_ASSESSMENT: NONE - DENIES PAIN
PAIN_FUNCTIONAL_ASSESSMENT: PREVENTS OR INTERFERES SOME ACTIVE ACTIVITIES AND ADLS
PAIN_FUNCTIONAL_ASSESSMENT: ACTIVITIES ARE NOT PREVENTED

## 2023-11-07 ASSESSMENT — PAIN DESCRIPTION - ORIENTATION
ORIENTATION: RIGHT

## 2023-11-07 ASSESSMENT — PAIN SCALES - WONG BAKER
WONGBAKER_NUMERICALRESPONSE: 0
WONGBAKER_NUMERICALRESPONSE: 0
WONGBAKER_NUMERICALRESPONSE: 2
WONGBAKER_NUMERICALRESPONSE: 0
WONGBAKER_NUMERICALRESPONSE: 0
WONGBAKER_NUMERICALRESPONSE: 4

## 2023-11-07 ASSESSMENT — PAIN DESCRIPTION - DESCRIPTORS
DESCRIPTORS: SHARP
DESCRIPTORS: ACHING
DESCRIPTORS: ACHING;DISCOMFORT
DESCRIPTORS: ACHING
DESCRIPTORS: ACHING;DISCOMFORT

## 2023-11-07 ASSESSMENT — PAIN SCALES - GENERAL
PAINLEVEL_OUTOF10: 7
PAINLEVEL_OUTOF10: 3
PAINLEVEL_OUTOF10: 6
PAINLEVEL_OUTOF10: 7
PAINLEVEL_OUTOF10: 4
PAINLEVEL_OUTOF10: 6
PAINLEVEL_OUTOF10: 7
PAINLEVEL_OUTOF10: 7
PAINLEVEL_OUTOF10: 2

## 2023-11-07 ASSESSMENT — PAIN DESCRIPTION - ONSET
ONSET: ON-GOING

## 2023-11-07 ASSESSMENT — PAIN DESCRIPTION - LOCATION
LOCATION: KNEE
LOCATION: LEG
LOCATION: KNEE

## 2023-11-07 ASSESSMENT — PAIN DESCRIPTION - PAIN TYPE
TYPE: SURGICAL PAIN
TYPE: ACUTE PAIN
TYPE: SURGICAL PAIN

## 2023-11-07 ASSESSMENT — PAIN DESCRIPTION - FREQUENCY
FREQUENCY: CONTINUOUS

## 2023-11-07 ASSESSMENT — ENCOUNTER SYMPTOMS: SHORTNESS OF BREATH: 0

## 2023-11-07 NOTE — H&P
Update History & Physical    The patient's History and Physical of October 17, 2023 was reviewed with the patient and I examined the patient. There was no change. The surgical site was confirmed by the patient and me. Plan: The risks, benefits, expected outcome, and alternative to the recommended procedure have been discussed with the patient. Patient understands and wants to proceed with the procedure.      Electronically signed by Diana Conde MD on 11/7/2023 at 9:16 AM

## 2023-11-07 NOTE — ANESTHESIA POSTPROCEDURE EVALUATION
Department of Anesthesiology  Postprocedure Note    Patient: Alma Reveles  MRN: [id number]  YOB: 1950  Date of evaluation: 11/7/2023      Procedure Summary       Date: 11/07/23 Room / Location: [address]    Anesthesia Start: 5866 Anesthesia Stop: 7359    Procedure: RIGHT TOTAL KNEE REPLACEMENT ROBOTIC ASSISTED (Right: Knee) Diagnosis:       Arthritis of right knee      (Arthritis of right knee [M17.11])    Surgeons: Radha Coleman MD Responsible Provider: Loni Moreira MD    Anesthesia Type: general ASA Status: 2            Anesthesia Type: No value filed.     Bucky Phase I: Bucky Score: 10    Bucky Phase II:        Anesthesia Post Evaluation    Patient location during evaluation: PACU  Patient participation: complete - patient participated  Level of consciousness: awake and alert  Pain score: 4  Airway patency: patent  Nausea & Vomiting: no nausea and no vomiting  Complications: no  Cardiovascular status: blood pressure returned to baseline  Respiratory status: acceptable  Hydration status: euvolemic  Pain management: adequate

## 2023-11-07 NOTE — PLAN OF CARE
Problem: Discharge Planning  Goal: Discharge to home or other facility with appropriate resources  11/7/2023 1606 by Eddie Burns RN  Outcome: Progressing  Flowsheets (Taken 11/7/2023 1606)  Discharge to home or other facility with appropriate resources: Identify barriers to discharge with patient and caregiver  11/7/2023 1605 by Eddie Burns RN  Outcome: Progressing  Flowsheets (Taken 11/7/2023 1605)  Discharge to home or other facility with appropriate resources: Identify barriers to discharge with patient and caregiver     Problem: Chronic Conditions and Co-morbidities  Goal: Patient's chronic conditions and co-morbidity symptoms are monitored and maintained or improved  11/7/2023 1606 by Eddie Burns RN  Outcome: Progressing  Flowsheets (Taken 11/7/2023 1606)  Care Plan - Patient's Chronic Conditions and Co-Morbidity Symptoms are Monitored and Maintained or Improved: Monitor and assess patient's chronic conditions and comorbid symptoms for stability, deterioration, or improvement  11/7/2023 1605 by Eddie Burns RN  Outcome: Progressing  Flowsheets (Taken 11/7/2023 1605)  Care Plan - Patient's Chronic Conditions and Co-Morbidity Symptoms are Monitored and Maintained or Improved: Monitor and assess patient's chronic conditions and comorbid symptoms for stability, deterioration, or improvement     Problem: Neurosensory - Adult  Goal: Achieves stable or improved neurological status  11/7/2023 1606 by Eddie Burns RN  Outcome: Progressing  Flowsheets (Taken 11/7/2023 1606)  Achieves stable or improved neurological status: Assess for and report changes in neurological status  11/7/2023 1605 by Eddie Burns RN  Outcome: Progressing  Flowsheets (Taken 11/7/2023 1605)  Achieves stable or improved neurological status: Assess for and report changes in neurological status     Problem: Respiratory - Adult  Goal: Achieves optimal ventilation and oxygenation  11/7/2023 1606 by

## 2023-11-07 NOTE — FLOWSHEET NOTE
Patient became light headed with ambulation with physical and occupational therapists. Patient assisted into chair. Blood pressure 160/101, patient reports he did not take his blood pressure medications this morning. Bishop Hanson NP at bedside. Light headedness resolved. Patient stood and ambulated a few steps and sitting in recliner.

## 2023-11-07 NOTE — OP NOTE
Patient: Nav Green  YOB: 1950  MRN: [id number]    DATE OF PROCEDURE: 11/7/2023      PREOPERATIVE DIAGNOSIS:  Tricompartmental degenerative osteoarthritis of the right knee. POSTOPERATIVE DIAGNOSIS:  Tricompartmental degenerative osteoarthritis of the right knee. OPERATION PERFORMED:  Robotic-assisted right total knee arthroplasty. SURGEON:  Diana Conde MD    EBL: 100 mL     IMPLANTS:  Yola Triathlon CR cementless femur size 6  Yola Triathlon cementless tibia size 7  11mm CS polyethylene  35mm cementless asymmetric patella     INDICATIONS:  The patient is a 75 y.o. male who presents for right knee replacement. The patient had been treated conservatively with anti-inflammatories, physical therapy, and intra-articular cortisone injections; these treatments were no longer providing significant relief. We discussed total knee arthroplasty. The operative procedure, alternatives, and risks were discussed in detail with the patient. The risks include but are not limited to: Infection, vessel injury, nerve injury, DVT, pulmonary embolism, implant loosening, need for revision surgery, loss of motion, continued pain. Informed consent for surgery was signed by the patient. OPERATIVE PROCEDURE:  The patient was seen in the preoperative holding area where the site of surgery was marked and informed consent was confirmed. The patient was brought back to the operating room by OR personnel. Anesthesia was administered. The patient was positioned supine on the operating table. The right lower extremity was then prepped and draped in a standard and sterile fashion. A final and formal timeout was then performed which confirmed the correct patient, correct position, and correct site of surgery. IV antibiotics were administered within 1 hour of the skin incision. An anterior midline incision was made over the knee. Skin and subcutaneous tissue were divided down to the extensor mechanism.  A

## 2023-11-07 NOTE — ANESTHESIA PRE PROCEDURE
last 72 hours. Coags:   Lab Results   Component Value Date/Time    PROTIME 12.5 10/30/2023 11:05 AM    INR 0.93 10/30/2023 11:05 AM    APTT 24.2 10/30/2023 11:05 AM       HCG (If Applicable): No results found for: \"PREGTESTUR\", \"PREGSERUM\", \"HCG\", \"HCGQUANT\"     ABGs: No results found for: \"PHART\", \"PO2ART\", \"KPH6COC\", \"BIX3ZXP\", \"BEART\", \"R0NQARSG\"     Type & Screen (If Applicable):  No results found for: \"LABABO\", \"LABRH\"    Drug/Infectious Status (If Applicable):  No results found for: \"HIV\", \"HEPCAB\"    COVID-19 Screening (If Applicable):   Lab Results   Component Value Date/Time    COVID19 Not Detected 11/11/2021 12:30 PM    COVID19 Not Detected 10/28/2020 09:18 AM           Anesthesia Evaluation  Patient summary reviewed no history of anesthetic complications:   Airway: Mallampati: II  TM distance: >3 FB   Neck ROM: full  Mouth opening: > = 3 FB   Dental:      Comment: Veneers     Pulmonary: breath sounds clear to auscultation  (+) sleep apnea:      (-) COPD, asthma and shortness of breath                           Cardiovascular:    (+) hypertension:,     (-) valvular problems/murmurs, past MI, CAD, CABG/stent, dysrhythmias,  angina,  CHF, orthopnea, PND and no pulmonary hypertension    ECG reviewed  Rhythm: regular  Rate: normal                    Neuro/Psych:   (+) neuromuscular disease:,    (-) seizures, TIA, CVA, headaches and psychiatric history           GI/Hepatic/Renal:        (-) GERD, PUD, hepatitis, liver disease and no renal disease       Endo/Other:    (+) Diabetes ( A1C 6.9%), : arthritis:., .                 Abdominal:             Vascular: Other Findings:           Anesthesia Plan      general     ASA 2       Induction: intravenous. MIPS: Postoperative opioids intended and Prophylactic antiemetics administered. Anesthetic plan and risks discussed with patient and child/children. Plan discussed with CRNA.           Post-op pain plan if not by surgeon: single peripheral nerve

## 2023-11-07 NOTE — ANESTHESIA PROCEDURE NOTES
Peripheral Block    Patient location during procedure: holding area  Reason for block: post-op pain management and at surgeon's request  Start time: 11/7/2023 10:40 AM  End time: 11/7/2023 10:44 AM  Staffing  Performed: anesthesiologist   Anesthesiologist: Venice Wallace MD  Performed by: Venice Wallace MD  Authorized by: Venice Wallace MD    Preanesthetic Checklist  Completed: patient identified, IV checked, site marked, risks and benefits discussed, surgical/procedural consents, equipment checked, pre-op evaluation, timeout performed, anesthesia consent given, oxygen available and monitors applied/VS acknowledged  Peripheral Block   Patient position: supine  Prep: ChloraPrep  Provider prep: mask  Patient monitoring: continuous pulse ox and IV access  Block type: Femoral  Adductor canal  Laterality: right  Injection technique: single-shot  Guidance: ultrasound guided    Needle   Needle type: combined needle/nerve stimulator   Needle gauge: 22 G  Needle localization: ultrasound guidance  Needle length: 2in. Assessment   Injection assessment: negative aspiration for heme, no paresthesia on injection and local visualized surrounding nerve on ultrasound  Paresthesia pain: none  Slow fractionated injection: yes  Hemodynamics: stable  Real-time US image taken/store: yes  Outcomes: patient tolerated procedure well    Additional Notes  Sartorius and Vastus Medialis Muscle, Femoral artery and Saphenous nerve are identified; the tip of the needle and the spread of the local anesthetic around the Saphenous nerve are visualized. The Saphenous nerve appeared to be anatomically normal and there were no abnormal pathologically findings seen.      Time out performed with aJyjay Sosa RN  Medications Administered  ropivacaine (NAROPIN) injection 0.5% - Perineural   20 mL - 11/7/2023 10:43:00 AM

## 2023-11-08 VITALS
HEART RATE: 76 BPM | DIASTOLIC BLOOD PRESSURE: 69 MMHG | SYSTOLIC BLOOD PRESSURE: 113 MMHG | WEIGHT: 263.01 LBS | OXYGEN SATURATION: 94 % | HEIGHT: 74 IN | RESPIRATION RATE: 16 BRPM | BODY MASS INDEX: 33.75 KG/M2 | TEMPERATURE: 98.4 F

## 2023-11-08 LAB
ANION GAP SERPL CALCULATED.3IONS-SCNC: 11 MMOL/L (ref 3–16)
BUN SERPL-MCNC: 14 MG/DL (ref 7–20)
CALCIUM SERPL-MCNC: 8.9 MG/DL (ref 8.3–10.6)
CHLORIDE SERPL-SCNC: 103 MMOL/L (ref 99–110)
CO2 SERPL-SCNC: 23 MMOL/L (ref 21–32)
CREAT SERPL-MCNC: 0.9 MG/DL (ref 0.8–1.3)
GFR SERPLBLD CREATININE-BSD FMLA CKD-EPI: >60 ML/MIN/{1.73_M2}
GLUCOSE BLD-MCNC: 188 MG/DL (ref 70–99)
GLUCOSE SERPL-MCNC: 167 MG/DL (ref 70–99)
PERFORMED ON: ABNORMAL
POTASSIUM SERPL-SCNC: 4.3 MMOL/L (ref 3.5–5.1)
SODIUM SERPL-SCNC: 137 MMOL/L (ref 136–145)

## 2023-11-08 PROCEDURE — G0378 HOSPITAL OBSERVATION PER HR: HCPCS

## 2023-11-08 PROCEDURE — 96360 HYDRATION IV INFUSION INIT: CPT

## 2023-11-08 PROCEDURE — 96361 HYDRATE IV INFUSION ADD-ON: CPT

## 2023-11-08 PROCEDURE — 6360000002 HC RX W HCPCS: Performed by: ORTHOPAEDIC SURGERY

## 2023-11-08 PROCEDURE — 36415 COLL VENOUS BLD VENIPUNCTURE: CPT

## 2023-11-08 PROCEDURE — 97530 THERAPEUTIC ACTIVITIES: CPT

## 2023-11-08 PROCEDURE — 80048 BASIC METABOLIC PNL TOTAL CA: CPT

## 2023-11-08 PROCEDURE — 6370000000 HC RX 637 (ALT 250 FOR IP): Performed by: ORTHOPAEDIC SURGERY

## 2023-11-08 PROCEDURE — 94760 N-INVAS EAR/PLS OXIMETRY 1: CPT

## 2023-11-08 PROCEDURE — 97535 SELF CARE MNGMENT TRAINING: CPT

## 2023-11-08 PROCEDURE — 2580000003 HC RX 258: Performed by: ORTHOPAEDIC SURGERY

## 2023-11-08 PROCEDURE — 97110 THERAPEUTIC EXERCISES: CPT

## 2023-11-08 RX ADMIN — PREGABALIN 75 MG: 75 CAPSULE ORAL at 07:54

## 2023-11-08 RX ADMIN — ASPIRIN 81 MG: 81 TABLET, COATED ORAL at 07:54

## 2023-11-08 RX ADMIN — MELOXICAM 7.5 MG: 7.5 TABLET ORAL at 05:17

## 2023-11-08 RX ADMIN — OXYCODONE HYDROCHLORIDE 10 MG: 10 TABLET ORAL at 06:21

## 2023-11-08 RX ADMIN — ROSUVASTATIN 40 MG: 40 TABLET, FILM COATED ORAL at 07:55

## 2023-11-08 RX ADMIN — INSULIN LISPRO 9 UNITS: 100 INJECTION, SOLUTION INTRAVENOUS; SUBCUTANEOUS at 07:55

## 2023-11-08 RX ADMIN — OXYCODONE HYDROCHLORIDE 10 MG: 10 TABLET ORAL at 10:21

## 2023-11-08 RX ADMIN — ACETAMINOPHEN 650 MG: 325 TABLET ORAL at 00:46

## 2023-11-08 RX ADMIN — SODIUM CHLORIDE: 4.5 INJECTION, SOLUTION INTRAVENOUS at 00:56

## 2023-11-08 RX ADMIN — SODIUM CHLORIDE 2000 MG: 900 INJECTION INTRAVENOUS at 03:46

## 2023-11-08 RX ADMIN — ACETAMINOPHEN 650 MG: 325 TABLET ORAL at 05:17

## 2023-11-08 ASSESSMENT — PAIN DESCRIPTION - PAIN TYPE
TYPE: ACUTE PAIN
TYPE: ACUTE PAIN
TYPE: SURGICAL PAIN
TYPE: SURGICAL PAIN

## 2023-11-08 ASSESSMENT — PAIN DESCRIPTION - LOCATION
LOCATION: LEG
LOCATION: KNEE
LOCATION: LEG
LOCATION: LEG

## 2023-11-08 ASSESSMENT — PAIN - FUNCTIONAL ASSESSMENT
PAIN_FUNCTIONAL_ASSESSMENT: PREVENTS OR INTERFERES SOME ACTIVE ACTIVITIES AND ADLS

## 2023-11-08 ASSESSMENT — PAIN SCALES - GENERAL
PAINLEVEL_OUTOF10: 2
PAINLEVEL_OUTOF10: 7
PAINLEVEL_OUTOF10: 2
PAINLEVEL_OUTOF10: 7

## 2023-11-08 ASSESSMENT — PAIN DESCRIPTION - DESCRIPTORS
DESCRIPTORS: SHARP
DESCRIPTORS: ACHING;DISCOMFORT
DESCRIPTORS: ACHING
DESCRIPTORS: SHARP

## 2023-11-08 ASSESSMENT — PAIN DESCRIPTION - ORIENTATION
ORIENTATION: RIGHT

## 2023-11-08 ASSESSMENT — PAIN SCALES - WONG BAKER
WONGBAKER_NUMERICALRESPONSE: 0
WONGBAKER_NUMERICALRESPONSE: 0
WONGBAKER_NUMERICALRESPONSE: 2

## 2023-11-08 ASSESSMENT — PAIN DESCRIPTION - ONSET
ONSET: ON-GOING
ONSET: GRADUAL

## 2023-11-08 ASSESSMENT — PAIN DESCRIPTION - FREQUENCY
FREQUENCY: CONTINUOUS
FREQUENCY: INTERMITTENT

## 2023-11-08 NOTE — PLAN OF CARE
Problem: Discharge Planning  Goal: Discharge to home or other facility with appropriate resources  11/8/2023 0906 by Cameron Burns RN  Outcome: Progressing  Flowsheets (Taken 11/8/2023 0906)  Discharge to home or other facility with appropriate resources: Identify barriers to discharge with patient and caregiver  11/8/2023 0039 by Tali Wayne RN  Outcome: Progressing  11/8/2023 0038 by Tali Wayne RN  Outcome: Progressing  11/8/2023 0038 by Tali Wayne RN  Outcome: Progressing     Problem: Chronic Conditions and Co-morbidities  Goal: Patient's chronic conditions and co-morbidity symptoms are monitored and maintained or improved  11/8/2023 0906 by Cameron Burns RN  Outcome: Progressing  Flowsheets (Taken 11/8/2023 0906)  Care Plan - Patient's Chronic Conditions and Co-Morbidity Symptoms are Monitored and Maintained or Improved: Monitor and assess patient's chronic conditions and comorbid symptoms for stability, deterioration, or improvement  11/8/2023 0039 by Tali Wayne RN  Outcome: Progressing  11/8/2023 0038 by Tali Wayne RN  Outcome: Progressing  11/8/2023 0038 by Tali Wayne RN  Outcome: Progressing     Problem: Neurosensory - Adult  Goal: Achieves stable or improved neurological status  11/8/2023 0906 by Cameron Burns RN  Outcome: Progressing  Flowsheets (Taken 11/8/2023 0906)  Achieves stable or improved neurological status: Assess for and report changes in neurological status  11/8/2023 0039 by Tali Wayne RN  Outcome: Progressing  11/8/2023 0038 by Tali Wayne RN  Outcome: Progressing     Problem: Respiratory - Adult  Goal: Achieves optimal ventilation and oxygenation  11/8/2023 0906 by Cameron Burns RN  Outcome: Progressing  Flowsheets (Taken 11/8/2023 0906)  Achieves optimal ventilation and oxygenation: Assess for changes in respiratory status  11/8/2023 0039 by Tali Wayne RN  Outcome: Progressing  11/8/2023 0038 by Tali Wayne RN  Outcome:

## 2023-11-08 NOTE — PLAN OF CARE
Problem: Discharge Planning  Goal: Discharge to home or other facility with appropriate resources  11/8/2023 0039 by John Marshall RN  Outcome: Progressing     Problem: Chronic Conditions and Co-morbidities  Goal: Patient's chronic conditions and co-morbidity symptoms are monitored and maintained or improved  11/8/2023 0039 by John Marshall RN  Outcome: Progressing     Problem: Neurosensory - Adult  Goal: Achieves stable or improved neurological status  11/8/2023 0039 by John Marshall RN  Outcome: Progressing     Problem: Respiratory - Adult  Goal: Achieves optimal ventilation and oxygenation  11/8/2023 0039 by John Marshall RN  Outcome: Progressing     Problem: Skin/Tissue Integrity - Adult  Goal: Skin integrity remains intact  11/8/2023 0039 by John Marshall RN  Outcome: Progressing     Problem: Skin/Tissue Integrity - Adult  Goal: Incisions, wounds, or drain sites healing without S/S of infection  11/8/2023 0039 by John Marshall RN  Outcome: Progressing     Problem: Musculoskeletal - Adult  Goal: Return mobility to safest level of function  11/8/2023 0039 by John Marshall RN  Outcome: Progressing     Problem: Musculoskeletal - Adult  Goal: Maintain proper alignment of affected body part  11/8/2023 0039 by John Marshall RN  Outcome: Progressing     Problem: Musculoskeletal - Adult  Goal: Return ADL status to a safe level of function  11/8/2023 0039 by John Marshall RN  Outcome: Progressing     Problem: Infection - Adult  Goal: Absence of infection at discharge  11/8/2023 0039 by John Marshall RN  Outcome: Progressing     Problem: Infection - Adult  Goal: Absence of infection during hospitalization  11/8/2023 0039 by John Marshall RN  Outcome: Progressing     Problem: Metabolic/Fluid and Electrolytes - Adult  Goal: Glucose maintained within prescribed range  11/8/2023 0039 by John Marshall RN  Outcome: Progressing     Problem: Pain  Goal: Verbalizes/displays adequate comfort level or baseline comfort

## 2023-11-08 NOTE — CARE COORDINATION
11/07/23 1712   IMM Letter   Observation Status Letter date given: 11/07/23   Observation Status Letter time given: 0898   Observation Status Letter given to Patient/Family/Significant other/Guardian/POA/by: ROMELIA explained to sheba and copy provided per MELY Blue RN     Electronically signed by Reid Ballesteros on 11/7/2023 at 5:13 PM  #[phone]
Critical access hospital    Spoke with patient regarding [facility] services. Patient aware and agreeable to services. Demographics verified. Patient will be seen by  11/9/23. Orders sent to [facility] via direct link. Electronically signed by Tanmay Lopez LPN on 16/3/53 at 2:95 AM EST      [facility] Transition Nurse  [phone]
DISCHARGE SUMMARY     DATE OF DISCHARGE: 11/8/23    DISCHARGE DESTINATION: home    HOME CARE: Yes    Agency Name: [facility]ri[facility] Davis  Discharging to Facility/ Agency   Name:  [facility] care    Address: [place] [address]  Phone: [phone]  Fax: [phone]     Notified: RN, Family, and Facility/Agency    TRANSPORTATION: Private Car    NEW DME ORDERED: N/A  Electronically signed by Moustapha Reyes on 11/8/2023 at 11:44 AM  #[phone]
preferences, and shares the quality data associated with the providers? Yes     Case Management Assessment  Initial Evaluation    Date/Time of Evaluation: 11/7/2023 5:18 PM  Assessment Completed by: Anjana Fischer    If patient is discharged prior to next notation, then this note serves as note for discharge by case management. Patient Name: Jay Gould                   YOB: 1950  Diagnosis: Arthritis of right knee [M17.11]                   Date / Time: 11/7/2023  8:29 AM    Patient Admission Status: Observation   Readmission Risk (Low < 19, Mod (19-27), High > 27): No data recorded  Current PCP: Rhina Mae MD  PCP verified by ? (P) Yes    Chart Reviewed: Yes      History Provided by: (P) Patient  Patient Orientation: (P) Alert and Oriented, Person, Place, Situation    Patient Cognition: (P) Alert    Hospitalization in the last 30 days (Readmission):  No    If yes, Readmission Assessment in  Navigator will be completed.     Advance Directives:      Code Status: Full Code   Patient's Primary Decision Maker is: (P) Legal Next of Kin      Discharge Planning:    Patient lives with: (P) Spouse/Significant Other Type of Home: (P) House  Primary Care Giver: (P) Self  Patient Support Systems include: (P) Spouse/Significant Other   Current Financial resources: (P) Medicare ([employer] medicare)  Current community resources: (P) None  Current services prior to admission: (P) Durable Medical Equipment            Current DME: (P) Shower Chair, Walker, Other (Comment) (RTS)            Type of Home Care services:  (P) None    ADLS  Prior functional level: (P) Independent in ADLs/IADLs  Current functional level: (P) Assistance with the following:, Mobility    PT AM-PAC:   /24  OT AM-PAC: 18 /24    Family can provide assistance at DC: (P) Yes  Would you like Case Management to discuss the discharge plan with any other family members/significant others, and if so, who? (P) Yes (wife at

## 2023-11-09 NOTE — DISCHARGE SUMMARY
Physician Discharge Summary     Patient ID:  Rosaria Jeans  [id number]  44 y.o.  1950    Admit date: 11/7/2023    Discharge date and time: 11/8/2023 10:30 AM     Admitting Physician: Maynor Barr MD     Discharge Physician: Cata Serrato    Admission Diagnoses: Arthritis of right knee [M17.11]    Discharge Diagnoses: right knee OA    Admission Condition: good    Discharged Condition: good    Indication for Admission: Failed conservative treatment as outpatient for joint pain including PT and pain meds. This patient was then electively scheduled for total joint replacement surgery    Surgical procedure: right TKA    Consults: PT OT SS    This patient had no postoperative complications. They has PT and OT for ADL's . IV and PO pain med for pain control and was eventually DC in stable condition    Treatments: analgesia,  therapies: PT OT,  and surgery      Disposition: home    Patient Instructions:   [unfilled]  Activity: activity as tolerated  Diet: regular diet  Wound Care: keep wound clean and dry    Follow-up with MELY London in 2 weeks.     Signed:  MARIO Jefferson CNP  11/9/2023  2:14 PM

## 2023-11-13 ENCOUNTER — TELEPHONE (OUTPATIENT)
Dept: ORTHOPEDICS UNIT | Age: 73
End: 2023-11-13

## 2023-11-13 NOTE — TELEPHONE ENCOUNTER
Spoke with patient regarding post discharge from hospital.    Incision status: No drainage, odor, or redness noted per patient    Edema/Swelling/Teds: edema noted, wearing teds    Pain level and status: 2-4/10 tolerable     Use of pain medications: yes ; Patient stated they are taking their pain medication tylenol as prescribed. Stopped oxycodone last Thursday. Use of ice therapy: yes     Blood thinner: aspirin ; Verified with patient that they are taking their anticoagulant as prescribed twice a day. Bowels: no constipation currently    Home Care Agency active: yes ; Claims already worked with home therapists . Outpatient therapy: n/a    Do you have all of your medications: yes    Changes in medications: no    No other questions/concerns at this time. Encouraged patient to call Orthopedic Nurse Navigator Jhonny Killer or Orthopedic office if has any questions/concerns.       Follow up appointments:    Future Appointments   Date Time Provider [address] [place] [address]   11/20/2023  1:00 PM MD Haven Diop [facility]     Electronically signed by Ford Cates RN on 11/13/2023 at 3:20 PM

## 2023-11-20 ENCOUNTER — OFFICE VISIT (OUTPATIENT)
Dept: ORTHOPEDIC SURGERY | Age: 73
End: 2023-11-20

## 2023-11-20 DIAGNOSIS — Z96.651 STATUS POST TOTAL RIGHT KNEE REPLACEMENT: Primary | ICD-10-CM

## 2023-11-20 PROCEDURE — 99024 POSTOP FOLLOW-UP VISIT: CPT | Performed by: ORTHOPAEDIC SURGERY

## 2023-11-20 NOTE — PROGRESS NOTES
[address] [facility]  Office Visit    Chief Complaint: Follow-up s/p right total knee arthroplasty    HPI:  Ira Burr is a 75 y. o. who is here in follow-up of right total knee arthroplasty performed on November 7, 2023. He is doing very well postoperatively. He is taking Tylenol as needed for pain control and walking with a walker. He has been active with home physical therapy. 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 Intact sensation to light touch. Motor exam 4+ to 5/5 in all major motor groups. Right knee: Incision is healing as expected. Range of motion is 0 to 90 degrees. Sensation is intact light touch. There is brisk capillary refill. There is 5/5 muscle strength in all muscle groups. Imaging:  3 views of the right knee were performed and reviewed today.  Significant for total knee arthroplasty prosthesis in

## 2023-11-30 ENCOUNTER — TELEPHONE (OUTPATIENT)
Dept: ORTHOPEDIC SURGERY | Age: 73
End: 2023-11-30

## 2023-11-30 NOTE — TELEPHONE ENCOUNTER
General Question     Subject: SIGN ORDERS PLEASE  Patient and /or Facility Request: LAYA -   Contact Number: [phone]     PLEASE LOG ONTO PORTAL AND SIGN OFF ON [facility] ORDERS. FEEL FREE TO CALL IF YOU HAVE QUESTIONS.

## 2023-12-01 ENCOUNTER — HOSPITAL ENCOUNTER (OUTPATIENT)
Dept: PHYSICAL THERAPY | Age: 73
Setting detail: THERAPIES SERIES
Discharge: HOME | End: 2023-12-01
Attending: ORTHOPAEDIC SURGERY
Payer: MEDICARE

## 2023-12-01 DIAGNOSIS — R60.9 SWELLING: ICD-10-CM

## 2023-12-01 DIAGNOSIS — R68.89 DECREASED EXERCISE TOLERANCE: ICD-10-CM

## 2023-12-01 DIAGNOSIS — R52 PAIN: Primary | ICD-10-CM

## 2023-12-01 DIAGNOSIS — R53.1 FUNCTIONAL WEAKNESS: ICD-10-CM

## 2023-12-01 DIAGNOSIS — R68.89 DECREASED ABILITY TO PERFORM ACTIVITIES: ICD-10-CM

## 2023-12-01 DIAGNOSIS — M25.60 LIMITED JOINT RANGE OF MOTION (ROM): ICD-10-CM

## 2023-12-01 DIAGNOSIS — Z78.9 NEED FOR HOME EXERCISE PROGRAM: ICD-10-CM

## 2023-12-01 DIAGNOSIS — R68.89 DECREASED ACTIVITY TOLERANCE: ICD-10-CM

## 2023-12-01 DIAGNOSIS — R26.9 GAIT DIFFICULTY: ICD-10-CM

## 2023-12-01 PROCEDURE — 97161 PT EVAL LOW COMPLEX 20 MIN: CPT

## 2023-12-01 PROCEDURE — 97110 THERAPEUTIC EXERCISES: CPT

## 2023-12-01 PROCEDURE — 97140 MANUAL THERAPY 1/> REGIONS: CPT

## 2023-12-01 NOTE — FLOWSHEET NOTE
[address] [facility] [address] office: [phone] fax: [phone]      Physical Therapy: TREATMENT/PROGRESS NOTE   Patient: Codie Taylor (24 y.o. male)   Treatment Date: 2023   :  1950 MRN: [id number]   Visit #: 1   Insurance Allowable Auth Needed   Cohere Kaitlynn Judy []Yes    []No    Insurance: Payor: Genna Rosenberg / Plan: Gregory Padron / Product Type: *No Product type* /   Insurance ID: [id number] - (Medicare Managed)  Secondary Insurance (if applicable):    Treatment Diagnosis:     ICD-10-CM    1. Pain  R52       2. Decreased activity tolerance  R68.89       3. Gait difficulty  R26.9       4. Swelling  R60.9       5. Limited joint range of motion (ROM)  M25.60       6. Decreased exercise tolerance  R68.89       7. Decreased ability to perform activities  R68.89       8. Need for home exercise program  Z78.9       9.  Functional weakness  R53.1          Medical Diagnosis:    Status post total right knee replacement [Z96.651]   Referring Physician: Aubrey Vines MD  PCP: Dennison Saint., MD                             Plan of care signed (Y/N):     Date of Patient follow up with Physician:      Progress Report/POC: EVAL today  POC update due: (10 visits /OR [address], whichever is less)  2023      (Cut and paste Precautions/Contraindications from Eval)    Preferred Language for Healthcare:   [x]English       []other:    SUBJECTIVE EXAMINATION     Patient Report/Comments: see eval     Test used Initial score  2023   Pain Summary VAS     Functional questionnaire WOMAC     Other:                OBJECTIVE EXAMINATION     Observation:     Test measurements: see eval    Exercises/Interventions:     Therapeutic Ex (74014)  Resistance Sets/ Reps/ Time Completed Notes/Cues/Progressions   cardio   > Rec bike          Knee AAROM  - supine heel slides  - seated knee flex w/ self OP      10s x

## 2023-12-05 ENCOUNTER — HOSPITAL ENCOUNTER (OUTPATIENT)
Dept: PHYSICAL THERAPY | Age: 73
Setting detail: THERAPIES SERIES
Discharge: HOME | End: 2023-12-05
Attending: ORTHOPAEDIC SURGERY
Payer: MEDICARE

## 2023-12-05 PROCEDURE — 97016 VASOPNEUMATIC DEVICE THERAPY: CPT

## 2023-12-05 PROCEDURE — 97140 MANUAL THERAPY 1/> REGIONS: CPT

## 2023-12-05 PROCEDURE — 97110 THERAPEUTIC EXERCISES: CPT

## 2023-12-05 NOTE — FLOWSHEET NOTE
Met: [] Adjusted  Patient will resume normal work/leisure activities without pain. Status: [] Progressing: [] Met: [] Not Met: [] Adjusted    Overall Progression Towards Functional goals/ Treatment Progress Update:  [] Patient is progressing as expected towards functional goals listed. [] Progression is slowed due to complexities/Impairments listed. [] Progression has been slowed due to co-morbidities. [x] Plan just implemented, too soon (<30days) to assess goals progression   [] Goals require adjustment due to lack of progress  [] Patient is not progressing as expected and requires additional follow up with physician  [] Other:     CHARGE CAPTURE     PT CHARGE GRID   CPT Code (TIMED) minutes # CPT Code (UNTIMED) #     Therex (86464)  25 2  EVAL:LOW (96513 - Typically 20 minutes face-to-face)     Neuromusc. Re-ed (78014)    Re-Eval (37971)     Manual (40261) 15 1  Estim Unattended (80144)     Ther. Act (30071)    [facility]. Traction ([id number])     Gait (32683)    Dry Needle 1-2 muscle (39168)     Aquatic Therex (46257)    Dry Needle 3+ muscle (26793)     Iontophoresis (65506)    VASO (02004) 1    Ultrasound (80221)    Group Therapy (75623)     Estim Attended (46749)    Canalith Repositioning (39330)     Other:    Other: Total Timed Code Tx Minutes 40 3       Total Treatment Minutes 50        Charge Justification:  (13363) THERAPEUTIC EXERCISE - Provided verbal/tactile cueing for activities related to strengthening, flexibility, endurance, ROM performed to prevent loss of range of motion, maintain or improve muscular strength or increase flexibility, following either an injury or surgery. (00593) 164 [facility] - Reviewed/Progressed HEP activities related to strengthening, flexibility, endurance, ROM performed to prevent loss of range of motion, maintain or improve muscular strength or increase flexibility, following either an injury or surgery.   (79828) NEUROMUSCULAR RE-EDUCATION - Therapeutic

## 2023-12-07 ENCOUNTER — HOSPITAL ENCOUNTER (OUTPATIENT)
Dept: PHYSICAL THERAPY | Age: 73
Setting detail: THERAPIES SERIES
Discharge: HOME | End: 2023-12-07
Attending: ORTHOPAEDIC SURGERY
Payer: MEDICARE

## 2023-12-07 PROCEDURE — 97016 VASOPNEUMATIC DEVICE THERAPY: CPT

## 2023-12-07 PROCEDURE — 97110 THERAPEUTIC EXERCISES: CPT

## 2023-12-07 PROCEDURE — 97140 MANUAL THERAPY 1/> REGIONS: CPT

## 2023-12-07 NOTE — FLOWSHEET NOTE
injury or surgery. (75139) NEUROMUSCULAR RE-EDUCATION - Therapeutic procedure, 1 or more areas, each 15 minutes; neuromuscular reeducation of movement, balance, coordination, kinesthetic sense, posture, and/or proprioception for sitting and/or standing activities  (02209) 164 [facility] - Reviewed/Progressed HEP activities related to neuromuscular reeducation of movement, balance, coordination, kinesthetic sense, posture, and/or proprioception for sitting and/or standing activities    (19141) THERAPEUTIC ACTIVITY - use of dynamic activities to improve functional performance. (Ex include squatting, ascending/descending stairs, walking, bending, lifting, catching, throwing, pushing, pulling, jumping.)  Direct, one on one contact, billed in 15-minute increments. (53795) MANUAL THERAPY -  Manual therapy techniques, 1 or more regions, each 15 minutes (Mobilization/manipulation, manual lymphatic drainage, manual traction) for the purpose of modulating pain, promoting relaxation,  increasing ROM, reducing/eliminating soft tissue swelling/inflammation/restriction, improving soft tissue extensibility and allowing for proper ROM for normal function with self care, mobility, lifting and ambulation  (11458) GAIT RE-EDUCATION - Provided training and instruction to the patient for proper joint and muscle recruitment and positioning and eccentric body weight control with ambulation re-education including up and down stairs. Therapeutic procedure, one or more areas, each 15 minutes;   (14013) VASOPNEUMATIC    TREATMENT PLAN   Plan: POC Initiated today- see eval for details    Electronically Signed by Sameera Gordillo PT              Date: 12/07/2023     Note: If patient does not return for scheduled/recommended follow up visits, this note will serve as a discharge from care along with the most recent update on progress.

## 2023-12-11 ENCOUNTER — HOSPITAL ENCOUNTER (OUTPATIENT)
Dept: PHYSICAL THERAPY | Age: 73
Setting detail: THERAPIES SERIES
Discharge: HOME | End: 2023-12-11
Attending: ORTHOPAEDIC SURGERY
Payer: MEDICARE

## 2023-12-11 PROCEDURE — 97110 THERAPEUTIC EXERCISES: CPT

## 2023-12-11 PROCEDURE — 97140 MANUAL THERAPY 1/> REGIONS: CPT

## 2023-12-11 NOTE — FLOWSHEET NOTE
[address] [facility] [address] office: [phone] fax: [phone]      Physical Therapy: TREATMENT/PROGRESS NOTE   Patient: Juvenal Urbano (46 y.o. male)   Treatment Date: 2023   :  1950 MRN: [id number]   Visit #: 4   Insurance Allowable Auth Needed   Cohere auth  15 visits till 24 []Yes    []No    Insurance: Payor: Huber Del Rio / Plan: Zahida Richard / Product Type: *No Product type* /   Insurance ID: [id number] - (Medicare Managed)  Secondary Insurance (if applicable):    Treatment Diagnosis:   No diagnosis found. Medical Diagnosis:    Status post total right knee replacement [Z96.651]   Referring Physician: Yu Newton MD  PCP: Kayleen Alvarez MD                             Plan of care signed (Y/N):     Date of Patient follow up with Physician:      Progress Report/POC: NO  POC update due: (10 visits /OR [address], whichever is less)  2023      (Cut and paste Precautions/Contraindications from [employer])    Preferred Language for Healthcare:   [x]English       []other:    SUBJECTIVE EXAMINATION     Patient Report/Comments: Pt reports doing well. Test used Initial score  2023   Pain Summary VAS      Functional questionnaire WOMAC      Other:       Knee ROM  L: 2* to 128*    R: Ext: lacks 4*    Flexion:  @ start: 90*  @ end: 105* (AAROM) @ start:   Flexion: 98*  Ext: lacks 4*    @ end:   Flex: 105* @ start: 100*    @ end: 106*?         OBJECTIVE EXAMINATION     Observation:     Test measurements: see eval    Exercises/Interventions:      Therapeutic Ex (88250)  Resistance Sets/ Reps/ Time Completed Notes/Cues/Progressions   Cardio/ warm up Rec bike 5' x Arcs to full rev per amparo          Knee AAROM  - supine heel slides  - seated knee flex w/ self OP      10s x 15  1' x 3   X  >prn           LAQ 1pl 2x10 x R   HS curl 2pl 2x10 x bilat          Heel/ toe raise

## 2023-12-13 ENCOUNTER — HOSPITAL ENCOUNTER (OUTPATIENT)
Dept: PHYSICAL THERAPY | Age: 73
Setting detail: THERAPIES SERIES
Discharge: HOME | End: 2023-12-13
Attending: ORTHOPAEDIC SURGERY
Payer: MEDICARE

## 2023-12-13 PROCEDURE — 97140 MANUAL THERAPY 1/> REGIONS: CPT

## 2023-12-13 PROCEDURE — 97110 THERAPEUTIC EXERCISES: CPT

## 2023-12-13 NOTE — FLOWSHEET NOTE
stairs. Therapeutic procedure, one or more areas, each 15 minutes;   (98917) VASOPNEUMATIC    TREATMENT PLAN   Plan: POC Initiated today- see eval for details    Electronically Signed by Celio Millan, PT              Date: 12/13/2023     Note: If patient does not return for scheduled/recommended follow up visits, this note will serve as a discharge from care along with the most recent update on progress.

## 2023-12-26 ENCOUNTER — HOSPITAL ENCOUNTER (OUTPATIENT)
Dept: PHYSICAL THERAPY | Age: 73
Setting detail: THERAPIES SERIES
Discharge: HOME | End: 2023-12-26
Attending: ORTHOPAEDIC SURGERY
Payer: MEDICARE

## 2023-12-26 PROCEDURE — 97140 MANUAL THERAPY 1/> REGIONS: CPT

## 2023-12-26 PROCEDURE — 97110 THERAPEUTIC EXERCISES: CPT

## 2023-12-26 NOTE — FLOWSHEET NOTE
(62030) VASOPNEUMATIC    TREATMENT PLAN   Plan: POC Initiated today- see eval for details    Electronically Signed by Lorenzo Jones, PTA    [id number]          Date: 12/26/2023     Note: If patient does not return for scheduled/recommended follow up visits, this note will serve as a discharge from care along with the most recent update on progress.

## 2023-12-28 ENCOUNTER — HOSPITAL ENCOUNTER (OUTPATIENT)
Dept: PHYSICAL THERAPY | Age: 73
Setting detail: THERAPIES SERIES
Discharge: HOME | End: 2023-12-28
Attending: ORTHOPAEDIC SURGERY
Payer: MEDICARE

## 2023-12-28 PROCEDURE — 97140 MANUAL THERAPY 1/> REGIONS: CPT

## 2023-12-28 PROCEDURE — 97110 THERAPEUTIC EXERCISES: CPT

## 2023-12-28 NOTE — FLOWSHEET NOTE
[address] [facility] [address] office: [phone] fax: [phone]      Physical Therapy: TREATMENT/PROGRESS NOTE   Patient: Carol Ingram (1 y.o. male)   Treatment Date: 2023   :  1950 MRN: [id number]   Visit #: 9   Insurance Allowable Auth Needed   Cohere auth  15 visits till 24 []Yes    []No    Insurance: Payor: Esvin Adams / Plan: Radha Yuan / Product Type: *No Product type* /   Insurance ID: [id number] - (Medicare Managed)  Secondary Insurance (if applicable):    Treatment Diagnosis:   No diagnosis found. Medical Diagnosis:    Status post total right knee replacement [Z96.651]   Referring Physician: Lindsay Manuel MD  PCP: Denver Juares MD                             Plan of care signed (Y/N):     Date of Patient follow up with Physician:      Progress Report/POC: NO  POC update due: (10 visits /OR [address], whichever is less)  2023      (Cut and paste Precautions/Contraindications from Eval)    Preferred Language for Healthcare:   [x]English       []other:    SUBJECTIVE EXAMINATION     Patient Report/Comments: Pt reports knee feels a little stiff from not stretching too much over the holidays. States still having some trouble sleeping. Test used Initial score  2023   Pain Summary VAS        Functional questionnaire WOMAC        Other:         Knee ROM  L: 2* to 128*    R: Ext: lacks 4*    Flexion:  @ start: 90*  @ end: 105* (AAROM) @ start:   Flexion: 98*  Ext: lacks 4*    @ end:   Flex: 105* @ start: 100*    @ end: 106*?   Best: 107* Start 105   Best 109       OBJECTIVE EXAMINATION     Observation:     Test measurements: see eval    Exercises/Interventions:      Therapeutic Ex (85427)  Resistance Sets/ Reps/ Time Completed Notes/Cues/Progressions   Cardio/ warm up Rec bike 5' x Arcs to full rev per amparo          Knee AAROM  - supine

## 2024-01-02 ENCOUNTER — HOSPITAL ENCOUNTER (OUTPATIENT)
Dept: PHYSICAL THERAPY | Age: 74
Setting detail: THERAPIES SERIES
Discharge: HOME | End: 2024-01-02
Attending: ORTHOPAEDIC SURGERY
Payer: MEDICARE

## 2024-01-02 PROCEDURE — 97110 THERAPEUTIC EXERCISES: CPT

## 2024-01-02 PROCEDURE — 97140 MANUAL THERAPY 1/> REGIONS: CPT

## 2024-01-02 NOTE — FLOWSHEET NOTE
ambulation re-education including up and down stairs. Therapeutic procedure, one or more areas, each 15 minutes;   (62582) VASOPNEUMATIC    TREATMENT PLAN   Plan: POC Initiated today- see eval for details  12/28: cont 2x/wk for a few more weeks, then taper to 1x/wk.     Electronically Signed by Femi Chang, PT    [id number]          Date: 01/02/2024     Note: If patient does not return for scheduled/recommended follow up visits, this note will serve as a discharge from care along with the most recent update on progress.

## 2024-01-04 ENCOUNTER — HOSPITAL ENCOUNTER (OUTPATIENT)
Dept: PHYSICAL THERAPY | Age: 74
Setting detail: THERAPIES SERIES
Discharge: HOME | End: 2024-01-04
Attending: ORTHOPAEDIC SURGERY
Payer: MEDICARE

## 2024-01-04 PROCEDURE — 97140 MANUAL THERAPY 1/> REGIONS: CPT

## 2024-01-04 PROCEDURE — 97110 THERAPEUTIC EXERCISES: CPT

## 2024-01-04 NOTE — FLOWSHEET NOTE
[facility]- Outpatient Rehabilitation and Therapy [address], [address], [place] [address] office: [phone] fax: [phone]      Physical Therapy: TREATMENT/PROGRESS NOTE   Patient: Tr Eckert (73 y.o. male)   Treatment Date: 2024   :  1950 MRN: [id number]   Visit #: 11 /15  Insurance Allowable Auth Needed   Cohere auth  15 visits till 24 []Yes    []No    Insurance: Payor: HUMANA MEDICARE / Plan: HUMANA CHOICE-PPO MEDICARE / Product Type: *No Product type* /   Insurance ID: [id number] - (Medicare Managed)  Secondary Insurance (if applicable):    Treatment Diagnosis:   No diagnosis found.     Medical Diagnosis:    Status post total right knee replacement [Z96.651]   Referring Physician: Juan A London MD  PCP: Suzy Moore MD                             Plan of care signed (Y/N):     Date of Patient follow up with Physician:      Progress Report/POC: YES and Date Range for this report: 23 to 24  POC update due: (10 visits /OR AUTH LIMITS, whichever is less)  2023, 2/15/24      (Cut and paste Precautions/Contraindications from Eval)    Preferred Language for Healthcare:   [x]English       []other:    SUBJECTIVE EXAMINATION     Patient Report/Comments: Pt reports knee is doing ok just stiffness.States he has not been using his cane as much.     Test used Initial score  2023   Pain Summary VAS          Functional questionnaire WOMAC 22%     27%    Other:           Knee ROM  L: 2* to 128*    R: Ext: lacks 4*    Flexion:  @ start: 90*  @ end: 105* (AAROM) @ start:   Flexion: 98*  Ext: lacks 4*    @ end:   Flex: 105* @ start: 100*    @ end: 106*?  Best: 107* Start 105   Best 109  Best 112       OBJECTIVE EXAMINATION     Observation:     Test measurements: see eval    Exercises/Interventions:      Therapeutic Ex (06534)  Resistance Sets/ Reps/ Time Completed Notes/Cues/Progressions   Cardio/ warm up Rec bike 5' x

## 2024-01-09 ENCOUNTER — HOSPITAL ENCOUNTER (OUTPATIENT)
Dept: PHYSICAL THERAPY | Age: 74
Setting detail: THERAPIES SERIES
Discharge: HOME | End: 2024-01-09
Attending: ORTHOPAEDIC SURGERY
Payer: MEDICARE

## 2024-01-09 PROCEDURE — 97140 MANUAL THERAPY 1/> REGIONS: CPT

## 2024-01-09 PROCEDURE — 97110 THERAPEUTIC EXERCISES: CPT

## 2024-01-09 NOTE — FLOWSHEET NOTE
Timed Code Tx Minutes 45 3       Total Treatment Minutes 45        Charge Justification:  (09717) THERAPEUTIC EXERCISE - Provided verbal/tactile cueing for activities related to strengthening, flexibility, endurance, ROM performed to prevent loss of range of motion, maintain or improve muscular strength or increase flexibility, following either an injury or surgery.   (22753) HOME EXERCISE PROGRAM - Reviewed/Progressed HEP activities related to strengthening, flexibility, endurance, ROM performed to prevent loss of range of motion, maintain or improve muscular strength or increase flexibility, following either an injury or surgery.  (35431) NEUROMUSCULAR RE-EDUCATION - Therapeutic procedure, 1 or more areas, each 15 minutes; neuromuscular reeducation of movement, balance, coordination, kinesthetic sense, posture, and/or proprioception for sitting and/or standing activities  (24034) HOME EXERCISE PROGRAM - Reviewed/Progressed HEP activities related to neuromuscular reeducation of movement, balance, coordination, kinesthetic sense, posture, and/or proprioception for sitting and/or standing activities    (90855) THERAPEUTIC ACTIVITY - use of dynamic activities to improve functional performance. (Ex include squatting, ascending/descending stairs, walking, bending, lifting, catching, throwing, pushing, pulling, jumping.)  Direct, one on one contact, billed in 15-minute increments.  (92180) MANUAL THERAPY -  Manual therapy techniques, 1 or more regions, each 15 minutes (Mobilization/manipulation, manual lymphatic drainage, manual traction) for the purpose of modulating pain, promoting relaxation,  increasing ROM, reducing/eliminating soft tissue swelling/inflammation/restriction, improving soft tissue extensibility and allowing for proper ROM for normal function with self care, mobility, lifting and ambulation  (77398) GAIT RE-EDUCATION - Provided training and instruction to the patient for proper joint and muscle

## 2024-01-11 ENCOUNTER — HOSPITAL ENCOUNTER (OUTPATIENT)
Dept: PHYSICAL THERAPY | Age: 74
Setting detail: THERAPIES SERIES
Discharge: HOME | End: 2024-01-11
Attending: ORTHOPAEDIC SURGERY
Payer: MEDICARE

## 2024-01-11 PROCEDURE — 97110 THERAPEUTIC EXERCISES: CPT

## 2024-01-11 PROCEDURE — 97140 MANUAL THERAPY 1/> REGIONS: CPT

## 2024-01-11 NOTE — FLOWSHEET NOTE
recruitment and positioning and eccentric body weight control with ambulation re-education including up and down stairs. Therapeutic procedure, one or more areas, each 15 minutes;   (37764) VASOPNEUMATIC    TREATMENT PLAN   Plan: POC Initiated today- see eval for details  12/28: cont 2x/wk for a few more weeks, then taper to 1x/wk.     Electronically Signed by Haroon Rogers, BURT    [id number]          Date: 01/11/2024     Note: If patient does not return for scheduled/recommended follow up visits, this note will serve as a discharge from care along with the most recent update on progress.

## 2024-01-16 ENCOUNTER — HOSPITAL ENCOUNTER (OUTPATIENT)
Dept: PHYSICAL THERAPY | Age: 74
Setting detail: THERAPIES SERIES
Discharge: HOME | End: 2024-01-16
Attending: ORTHOPAEDIC SURGERY
Payer: MEDICARE

## 2024-01-16 PROCEDURE — 97110 THERAPEUTIC EXERCISES: CPT

## 2024-01-16 PROCEDURE — 97140 MANUAL THERAPY 1/> REGIONS: CPT

## 2024-01-16 NOTE — FLOWSHEET NOTE
[facility]- Outpatient Rehabilitation and Therapy [address], [place] [address] office: [phone] fax: [phone]      Physical Therapy: TREATMENT/PROGRESS NOTE   Patient: Tr Eckert (73 y.o. male)   Treatment Date: 2024   :  1950 MRN: [id number]   Visit #: 14 /15  Insurance Allowable Auth Needed   Cohere auth  15 visits till 24 []Yes    []No    Insurance: Payor: HUMANA MEDICARE / Plan: HUMANA CHOICE-PPO MEDICARE / Product Type: *No Product type* /   Insurance ID: [id number] - (Medicare Managed)  Secondary Insurance (if applicable):    Treatment Diagnosis:   No diagnosis found.     Medical Diagnosis:    Status post total right knee replacement [Z96.651]   Referring Physician: Juan A London MD  PCP: Suzy Moore MD                             Plan of care signed (Y/N):     Date of Patient follow up with Physician:      Progress Report/POC: YES  POC update due: (10 visits /OR AUTH LIMITS, whichever is less)  2023, 2/15/24      (Cut and paste Precautions/Contraindications from Eval)    Preferred Language for Healthcare:   [x]English       []other:    SUBJECTIVE EXAMINATION     Patient Report/Comments: Pt is now just over 2 months out from surgery. He states his motion is still a struggle and he attibutes this to stiffness. Pain ranges from 0-2/10 overall. He still is having some trouble sleeping and trying to wean tylenol at night.      Test used Initial score  2023   Pain Summary VAS           Functional questionnaire WOMAC 22% (scoring error?)     27%    (9%)   Other:            Knee ROM  L: 2* to 128*    R: Ext: lacks 4*    Flexion:  @ start: 90*  @ end: 105* (AAROM) @ start:   Flexion: 98*  Ext: lacks 4*    @ end:   Flex: 105* @ start: 100*    @ end: 106*?  Best: 107* Start 105   Best 109  Best 112 AAROM: 104*   Knee ext MMT            Knee flex MMT                            OBJECTIVE

## 2024-01-18 ENCOUNTER — APPOINTMENT (OUTPATIENT)
Dept: PHYSICAL THERAPY | Age: 74
End: 2024-01-18
Attending: ORTHOPAEDIC SURGERY
Payer: MEDICARE

## 2024-01-22 ENCOUNTER — OFFICE VISIT (OUTPATIENT)
Dept: ORTHOPEDIC SURGERY | Age: 74
End: 2024-01-22

## 2024-01-22 VITALS — BODY MASS INDEX: 33.75 KG/M2 | RESPIRATION RATE: 16 BRPM | WEIGHT: 263 LBS | HEIGHT: 74 IN

## 2024-01-22 DIAGNOSIS — Z96.651 STATUS POST TOTAL RIGHT KNEE REPLACEMENT: Primary | ICD-10-CM

## 2024-01-22 PROCEDURE — 99024 POSTOP FOLLOW-UP VISIT: CPT | Performed by: PHYSICIAN ASSISTANT

## 2024-01-22 NOTE — PROGRESS NOTES
This dictation was done with [employer] dictation and may contain mechanical errors related to translation.  Resp. rate 16, height 1.88 m (6' 2\"), weight 119.3 kg (263 lb).    This is a very pleasant 73-year-old gentleman who is here in status post right total knee replacement from 11/7/2023.  Currently he is incision looks good healing nicely no signs of infection.  He is 0 to 120 degrees of motion good stable endpoint stable construct.  He was sent for x-rays.  Xray three views of the right total knee arthroplasty reveals satisfactory alignment of the prosthesis . No signs of significant polyethylene wear or failure. No progressive radiolucencies,fractures, tumors or dislocations.    With overall good motion good healing incision and good x-rays am happy with his overall progress he is on her 2 more visits of physical therapy he understands use of prophylactic antibiotics and will follow-up with us for exam and x-rays in 1 year or sooner with any problems

## 2024-01-23 ENCOUNTER — HOSPITAL ENCOUNTER (OUTPATIENT)
Dept: PHYSICAL THERAPY | Age: 74
Setting detail: THERAPIES SERIES
Discharge: HOME | End: 2024-01-23
Attending: ORTHOPAEDIC SURGERY
Payer: MEDICARE

## 2024-01-23 PROCEDURE — 97110 THERAPEUTIC EXERCISES: CPT

## 2024-01-23 PROCEDURE — 97140 MANUAL THERAPY 1/> REGIONS: CPT

## 2024-01-23 NOTE — FLOWSHEET NOTE
[facility]- Outpatient Rehabilitation and Therapy [address], [place] [address] office: [phone] fax: [phone]      Physical Therapy: TREATMENT/PROGRESS NOTE   Patient: Tr Eckert (73 y.o. male)   Treatment Date: 2024   :  1950 MRN: [id number]   Visit #: 15 /15  Insurance Allowable Auth Needed   Cohere auth  15 visits till 24 []Yes    []No    Insurance: Payor: HUMANA MEDICARE / Plan: HUMANA CHOICE-PPO MEDICARE / Product Type: *No Product type* /   Insurance ID: [id number] - (Medicare Managed)  Secondary Insurance (if applicable):    Treatment Diagnosis:   No diagnosis found.     Medical Diagnosis:    Status post total right knee replacement [Z96.651]   Referring Physician: Juan A London MD  PCP: Suzy Moore MD                             Plan of care signed (Y/N):     Date of Patient follow up with Physician:      Progress Report/POC: NO  POC update due: (10 visits /OR AUTH LIMITS, whichever is less)  2023, 2/15/24      (Cut and paste Precautions/Contraindications from Eval)    Preferred Language for Healthcare:   [x]English       []other:    SUBJECTIVE EXAMINATION     Patient Report/Comments: Pt states ortho was pleased. He feels confident to cont on his own at this point and plans to set up his home gym, and also plans to start going to [facility] to continue the bike and leg machines.      Test used Initial score  2023   Pain Summary VAS           Functional questionnaire WOMAC 22% (scoring error?)     27%    (9%)   Other:            Knee ROM  L: 2* to 128*    R: Ext: lacks 4*    Flexion:  @ start: 90*  @ end: 105* (AAROM) @ start:   Flexion: 98*  Ext: lacks 4*    @ end:   Flex: 105* @ start: 100*    @ end: 106*?  Best: 107* Start 105   Best 109  Best 112 AAROM: 104*   Knee ext MMT            Knee flex MMT                            OBJECTIVE EXAMINATION     Observation:     Test measurements:

## 2024-01-25 ENCOUNTER — APPOINTMENT (OUTPATIENT)
Dept: PHYSICAL THERAPY | Age: 74
End: 2024-01-25
Attending: ORTHOPAEDIC SURGERY
Payer: MEDICARE

## (undated) DEVICE — Z DUP USE 2715602 GUIDEWIRE SURG L220MM DIA25MM SHLDR FOR GLEN KEELED AEQUALIS

## (undated) DEVICE — GUIDE GLEN REVERSED BLUEPRINT

## (undated) DEVICE — SUTURE STRATAFIX SPRL SZ 2 0 L14IN ABSRB UD MH L36MM 1 2 CIR SXMP2B401

## (undated) DEVICE — DRAPE,SPLIT ,77X120: Brand: MEDLINE

## (undated) DEVICE — SYRINGE IRRIG 60ML SFT PLIABLE BLB EZ TO GRP 1 HND USE W/

## (undated) DEVICE — SUTURE STRATAFIX SPRL SZ 2 0 L14IN ABSRB UD MH L36MM 1 2 CIR SXMD2B401

## (undated) DEVICE — SYRINGE MED 3ML CLR PLAS STD N CTRL LUERLOCK TIP DISP

## (undated) DEVICE — PADDING CAST W6INXL4YD COT LO LINTING WYTEX

## (undated) DEVICE — GLOVE ORTHO 8   MSG9480

## (undated) DEVICE — PIN BNE FIX TEMP L140MM DIA4MM MAKO

## (undated) DEVICE — 3M™ COBAN™ NL STERILE NON-LATEX SELF-ADHERENT WRAP, 2083S, 3 IN X 5 YD (7,5 CM X 4,5 M), 24 ROLLS/CASE: Brand: 3M™ COBAN™

## (undated) DEVICE — PAD,NON-ADHERENT,3X8,STERILE,LF,1/PK: Brand: MEDLINE

## (undated) DEVICE — GOWN SIRUS NONREIN XL W/TWL: Brand: MEDLINE INDUSTRIES, INC.

## (undated) DEVICE — BOOT POS LEG DEMAYO

## (undated) DEVICE — BASIC SINGLE BASIN 1-LF: Brand: MEDLINE INDUSTRIES, INC.

## (undated) DEVICE — 3M™ IOBAN™ 2 ANTIMICROBIAL INCISE DRAPE 6640EZ: Brand: IOBAN™ 2

## (undated) DEVICE — SYRINGE MED 10ML LUERLOCK TIP W/O SFTY DISP

## (undated) DEVICE — 6619 2 PTNT ISO SYS INCISE AREA&LT;(&GT;&&LT;)&GT;P: Brand: STERI-DRAPE™ IOBAN™ 2

## (undated) DEVICE — C-ARM: Brand: UNBRANDED

## (undated) DEVICE — GOWN,REINF,POLY,AURORA,XLNG/XXL,STRL: Brand: MEDLINE

## (undated) DEVICE — SUTURE ABSORBABLE MONOFILAMENT 1-0 OS8 14 IN STRATAFIX SPRL SXPD2B202

## (undated) DEVICE — SOLUTION PREP POVIDONE IOD FOR SKIN MUCOUS MEM PRIOR TO

## (undated) DEVICE — GLOVE SURG SZ 8 L12IN FNGR THK79MIL GRN LTX FREE

## (undated) DEVICE — TAPE ADH W4INXL5YD WHT COT E BNDG RUB BASE CURAD

## (undated) DEVICE — Z DISCONTINUED USE 2716304 SUTURE STRATAFIX SPRL SZ 3-0 L12IN ABSRB UD FS-1 L24MM 3/8

## (undated) DEVICE — SOLUTION IV IRRIG POUR BRL 0.9% SODIUM CHL 2F7124

## (undated) DEVICE — BIPOLAR SEALER 23-112-1 AQM 6.0: Brand: AQUAMANTYS ®

## (undated) DEVICE — SYSTEM SKIN CLSR 22CM DERMBND PRINEO

## (undated) DEVICE — HYPODERMIC SAFETY NEEDLE: Brand: MONOJECT

## (undated) DEVICE — SUTURE VCRL + SZ 1 L18IN ABSRB UD L36MM CT-1 1/2 CIR VCP841D

## (undated) DEVICE — DUAL CUT SAGITTAL BLADE

## (undated) DEVICE — CANISTER, RIGID, 1200CC: Brand: MEDLINE INDUSTRIES, INC.

## (undated) DEVICE — 3M™ STERI-DRAPE™ U-DRAPE 1067 1067 5/BX 4BX/CS/CTN&#X20;: Brand: STERI-DRAPE™

## (undated) DEVICE — KIT DRP FOR RIO ROBOTIC ARM ASST SYS

## (undated) DEVICE — KIT INT FIX FEM TIB CKPT MAKOPLASTY

## (undated) DEVICE — DRAPE,ORTHOMAX,EXTREMITY: Brand: MEDLINE

## (undated) DEVICE — GLOVE ORANGE PI 8   MSG9080

## (undated) DEVICE — SUTURE ETHBND EXCEL SZ 0 L18IN NONABSORBABLE GRN L26MM MO-6 CX45D

## (undated) DEVICE — GLOVE ORANGE PI 8 1/2   MSG9085

## (undated) DEVICE — TOTAL BASIC: Brand: MEDLINE INDUSTRIES, INC.

## (undated) DEVICE — GLOVE SURG SZ 85 L12IN FNGR THK13MIL WHT ISOLEX POLYISOPRENE

## (undated) DEVICE — INTENDED FOR TISSUE SEPARATION, AND OTHER PROCEDURES THAT REQUIRE A SHARP SURGICAL BLADE TO PUNCTURE OR CUT.: Brand: BARD-PARKER ® STAINLESS STEEL BLADES

## (undated) DEVICE — MERCY HEALTH WEST TURNOVER: Brand: MEDLINE INDUSTRIES, INC.

## (undated) DEVICE — ELECTRODE BLDE L4IN NONINSULATED EDGE

## (undated) DEVICE — NEEDLE HYPO 18GA L1.5IN THN WALL PIVOTING SHLD BVL ORIENTED

## (undated) DEVICE — TOWEL,OR,DSP,ST,BLUE,STD,4/PK,20PK/CS: Brand: MEDLINE

## (undated) DEVICE — SOLUTION IV 1000ML 0.9% SOD CHL FOR IRRIG PLAS CONT

## (undated) DEVICE — INTENDED TO SUPPORT AND MAINTAIN THE POSITION OF AN ANESTHETIZED PATIENT DURING SURGERY: Brand: ERIN BEACH CHAIR FACE MASK

## (undated) DEVICE — GLOVE,SURG,SENSICARE SLT,LF,PF,8: Brand: MEDLINE

## (undated) DEVICE — IMMOBILIZER SHLDR M L15IN D8IN UNIV POLY COT W/ FOAM STRP

## (undated) DEVICE — 1010 S-DRAPE TOWEL DRAPE 10/BX: Brand: STERI-DRAPE™

## (undated) DEVICE — RETRACTOR SURG WIDE FLAT LO PROF LTWT PHOTONGUIDE

## (undated) DEVICE — KIT SHLDR STBL MARCO FOR SPIDER LIMB POS

## (undated) DEVICE — BANDAGE COMPR W6INXL15YD WHT BGE POLY COT WV E HK LOOP CLSR

## (undated) DEVICE — CORD RETRCT SIL

## (undated) DEVICE — SOLUTION PREP PAINT POV IOD FOR SKIN MUCOUS MEM

## (undated) DEVICE — PAD DRY FLOOR ABS 32X58IN GRN

## (undated) DEVICE — KIT TRK KNEE PROC VIZADISC

## (undated) DEVICE — SUTURE STRATAFIX SPRL SZ 3-0 L12IN ABSRB UD FS-1 L30X30CM SXMP2B410

## (undated) DEVICE — SHEET,DRAPE,53X77,STERILE: Brand: MEDLINE

## (undated) DEVICE — ADHESIVE SKIN CLOSURE XL 42 CM 2.7 CC MESH LIQUIBAND SECUR

## (undated) DEVICE — Z DUP USE 2715828 BIT DRL DIA3.2MM PERIPH SCR FOR AEQUALIS PERFORM REVERSED

## (undated) DEVICE — TOTAL KNEE: Brand: MEDLINE INDUSTRIES, INC.

## (undated) DEVICE — PIN BNE FIX L110MM DIA32MM

## (undated) DEVICE — GLOVE SURG SZ 85 L12IN FNGR ORTHO 126MIL CRM LTX FREE

## (undated) DEVICE — SUTURE ABSORBABLE MONOFILAMENT 1 OS-8 36 CM 40 MM VIO PDS +

## (undated) DEVICE — DRAPE C ARM UNIV W41XL74IN CLR PLAS XR VELC CLSR POLY STRP

## (undated) DEVICE — GLOVE,SURG,SENSICARE SLT,LF,PF,8.5: Brand: MEDLINE

## (undated) DEVICE — SPONGE LAP W18XL18IN WHT COT 4 PLY FLD STRUNG RADPQ DISP ST

## (undated) DEVICE — PADDING UNDERCAST W4INXL4YD 100% COT CRIMPED FINISH WBRL II

## (undated) DEVICE — KIT POS FOAM HANA TBL